# Patient Record
Sex: MALE | Race: WHITE | NOT HISPANIC OR LATINO | Employment: FULL TIME | URBAN - METROPOLITAN AREA
[De-identification: names, ages, dates, MRNs, and addresses within clinical notes are randomized per-mention and may not be internally consistent; named-entity substitution may affect disease eponyms.]

---

## 2017-11-10 ENCOUNTER — OFFICE VISIT (OUTPATIENT)
Dept: FAMILY MEDICINE | Facility: CLINIC | Age: 32
End: 2017-11-10
Payer: COMMERCIAL

## 2017-11-10 VITALS
DIASTOLIC BLOOD PRESSURE: 88 MMHG | SYSTOLIC BLOOD PRESSURE: 130 MMHG | BODY MASS INDEX: 31.34 KG/M2 | WEIGHT: 202.5 LBS | HEART RATE: 87 BPM | OXYGEN SATURATION: 97 % | TEMPERATURE: 98.2 F

## 2017-11-10 DIAGNOSIS — Z00.01 ENCOUNTER FOR GENERAL ADULT MEDICAL EXAMINATION WITH ABNORMAL FINDINGS: Primary | ICD-10-CM

## 2017-11-10 DIAGNOSIS — K21.9 GASTROESOPHAGEAL REFLUX DISEASE WITHOUT ESOPHAGITIS: ICD-10-CM

## 2017-11-10 DIAGNOSIS — R03.0 ELEVATED BLOOD PRESSURE READING WITHOUT DIAGNOSIS OF HYPERTENSION: ICD-10-CM

## 2017-11-10 DIAGNOSIS — F32.A ANXIETY AND DEPRESSION: ICD-10-CM

## 2017-11-10 DIAGNOSIS — F41.9 ANXIETY AND DEPRESSION: ICD-10-CM

## 2017-11-10 DIAGNOSIS — E66.3 OVERWEIGHT (BMI 25.0-29.9): ICD-10-CM

## 2017-11-10 DIAGNOSIS — Z13.6 CARDIOVASCULAR SCREENING; LDL GOAL LESS THAN 160: ICD-10-CM

## 2017-11-10 PROCEDURE — 83721 ASSAY OF BLOOD LIPOPROTEIN: CPT | Mod: 59 | Performed by: FAMILY MEDICINE

## 2017-11-10 PROCEDURE — 80061 LIPID PANEL: CPT | Performed by: FAMILY MEDICINE

## 2017-11-10 PROCEDURE — 99395 PREV VISIT EST AGE 18-39: CPT | Performed by: FAMILY MEDICINE

## 2017-11-10 PROCEDURE — 36415 COLL VENOUS BLD VENIPUNCTURE: CPT | Performed by: FAMILY MEDICINE

## 2017-11-10 PROCEDURE — 80053 COMPREHEN METABOLIC PANEL: CPT | Performed by: FAMILY MEDICINE

## 2017-11-10 RX ORDER — SERTRALINE HYDROCHLORIDE 100 MG/1
100 TABLET, FILM COATED ORAL DAILY
Qty: 90 TABLET | Refills: 3 | Status: SHIPPED | OUTPATIENT
Start: 2017-11-10 | End: 2018-11-08

## 2017-11-10 RX ORDER — SERTRALINE HYDROCHLORIDE 100 MG/1
TABLET, FILM COATED ORAL
Qty: 90 TABLET | Refills: 1 | Status: CANCELLED | OUTPATIENT
Start: 2017-11-10

## 2017-11-10 NOTE — PROGRESS NOTES
SUBJECTIVE:   CC: Viet Beebe is an 32 year old male who presents for preventative health visit.     Physical   Annual:     Getting at least 3 servings of Calcium per day::  Yes    Bi-annual eye exam::  NO    Dental care twice a year::  Yes    Sleep apnea or symptoms of sleep apnea::  Sleep apnea    Diet::  Regular (no restrictions)    Frequency of exercise::  None    Taking medications regularly::  Yes    Medication side effects::  None    Additional concerns today::  YES            -------------------------------------    Today's PHQ-2 Score: PHQ-2 ( 1999 [employer]) 11/10/2017   Q1: Little interest or pleasure in doing things 0   Q2: Feeling down, depressed or hopeless 0   PHQ-2 Score 0   Q1: Little interest or pleasure in doing things Not at all   Q2: Feeling down, depressed or hopeless Not at all   PHQ-2 Score 0       Abuse: Current or Past(Physical, Sexual or Emotional)- No  Do you feel safe in your environment - Yes    Social History   Substance Use Topics     Smoking status: Former Smoker     Types: Cigarettes     Quit date: 1/1/2010     Smokeless tobacco: Never Used     Alcohol use Yes      Comment: Drinks over rhe w/e. 5 drinks weekly     The patient does not drink >3 drinks per day nor >7 drinks per week.    Last PSA: No results found for: PSA    Reviewed orders with patient. Reviewed health maintenance and updated orders accordingly - Yes  Labs reviewed in EPIC    Reviewed and updated as needed this visit by clinical staff  Tobacco  Allergies  Meds  Med Hx  Surg Hx  Fam Hx  Soc Hx        Reviewed and updated as needed this visit by Provider  Tobacco        Past Medical History:   Diagnosis Date     Anxiety and depression     Sees Psychiatrist, [place], [place]. Depression for many years     Hypertension 2012     IBS (irritable bowel syndrome)       Past Surgical History:   Procedure Laterality Date     APPENDECTOMY  2007       Review of Systems  C: NEGATIVE for fever, chills, change in weight  I:  NEGATIVE for worrisome rashes, moles or lesions  E: NEGATIVE for vision changes or irritation  ENT: NEGATIVE for ear, mouth and throat problems  R: NEGATIVE for significant cough or SOB  CV: NEGATIVE for chest pain, palpitations or peripheral edema  GI: NEGATIVE for nausea, abdominal pain, heartburn, or change in bowel habits   male: negative for dysuria, hematuria, decreased urinary stream, erectile dysfunction, urethral discharge  M: NEGATIVE for significant arthralgias or myalgia  N: NEGATIVE for weakness, dizziness or paresthesias  P: NEGATIVE for changes in mood or affect    OBJECTIVE:   /88  Pulse 87  Temp 98.2  F (36.8  C) (Oral)  Wt 202 lb 8 oz (91.9 kg)  SpO2 97%  BMI 31.34 kg/m2    Physical Exam  GENERAL: healthy, alert and no distress  EYES: Eyes grossly normal to inspection, PERRL and conjunctivae and sclerae normal  HENT: ear canals and TM's normal, nose and mouth without ulcers or lesions  NECK: no adenopathy, no asymmetry, masses, or scars and thyroid normal to palpation  RESP: lungs clear to auscultation - no rales, rhonchi or wheezes  CV: regular rate and rhythm, normal S1 S2, no S3 or S4, no murmur, click or rub, no peripheral edema and peripheral pulses strong  ABDOMEN: soft, nontender, no hepatosplenomegaly, no masses and bowel sounds normal   (male): normal male genitalia without lesions or urethral discharge, no hernia  MS: no gross musculoskeletal defects noted, no edema  SKIN: no suspicious lesions or rashes  NEURO: Normal strength and tone, mentation intact and speech normal  PSYCH: mentation appears normal, affect normal/bright    ASSESSMENT/PLAN:   ASSESSMENT AND PLAN  1. Encounter for general adult medical examination with abnormal findings  Already had flu shot, tdap up to date.     2. Elevated blood pressure reading without diagnosis of hypertension  Previously on lisinopril then lost weight.  Currently increased job stress, weight gain.  Re-check today acceptable.  "    Plans to check 1-2 times/week at home.  If consistently above 130/80 will let me know and we can re-start lisinopril.      Discussed exercise/weight strategies.  Follow up one year at latest.   - Comprehensive metabolic panel    3. Overweight (BMI 25.0-29.9)  As above.     4. Gastroesophageal reflux disease without esophagitis  Not controlled with raniditine, reviewed risks of dementia with PPIs.   - omeprazole (PRILOSEC) 20 MG CR capsule; Take 1 capsule (20 mg) by mouth 30-60 minutes before a meal.  Generic med only.  Dispense: 90 capsule; Refill: 3    5. Anxiety and depression  Well controlled.  Continue sertraline.   - sertraline (ZOLOFT) 100 MG tablet; Take 1 tablet (100 mg) by mouth daily  Dispense: 90 tablet; Refill: 3    6. CARDIOVASCULAR SCREENING; LDL GOAL LESS THAN 160    - Lipid panel reflex to direct LDL Fasting    COUNSELING:   Reviewed preventive health counseling, as reflected in patient instructions       Regular exercise       Healthy diet/nutrition           reports that he quit smoking about 7 years ago. His smoking use included Cigarettes. He has never used smokeless tobacco.      Estimated body mass index is 31.34 kg/(m^2) as calculated from the following:    Height as of 10/19/16: 5' 7.4\" (1.712 m).    Weight as of this encounter: 202 lb 8 oz (91.9 kg).   Weight management plan: Discussed healthy diet and exercise guidelines and patient will follow up in 12 months in clinic to re-evaluate.    Counseling Resources:  ATP IV Guidelines  Pooled Cohorts Equation Calculator  FRAX Risk Assessment  ICSI Preventive Guidelines  Dietary Guidelines for Americans, 2010  USDA's MyPlate  ASA Prophylaxis  Lung CA Screening    Joel Daniel Wegener, MD  [facility]  Answers for HPI/ROS submitted by the patient on 11/10/2017   PHQ-2 Score: 0    "

## 2017-11-10 NOTE — MR AVS SNAPSHOT
After Visit Summary   11/10/2017    Viet Beebe    MRN: [id number]           Patient Information     Date Of Birth          1985        Visit Information        Provider Department      11/10/2017 3:20 PM Wegener, Joel Daniel Irwin, MD [facility]        Today's Diagnoses     Encounter for general adult medical examination with abnormal findings    -  1    Elevated blood pressure reading without diagnosis of hypertension        Overweight (BMI 25.0-29.9)        Gastroesophageal reflux disease without esophagitis        Anxiety and depression        CARDIOVASCULAR SCREENING; LDL GOAL LESS THAN 160          Care Instructions    ASSESSMENT AND PLAN  1. Encounter for general adult medical examination with abnormal findings  Already had flu shot, tdap up to date.     2. Elevated blood pressure reading without diagnosis of hypertension  Previously on lisinopril then lost weight.  Currently increased job stress, weight gain.  Re-check today acceptable.     Plans to check 1-2 times/week at home.  If consistently above 130/80 will let me know and we can re-start lisinopril.      Discussed exercise/weight strategies.  Follow up one year at latest.   - Comprehensive metabolic panel    3. Overweight (BMI 25.0-29.9)  As above.     4. Gastroesophageal reflux disease without esophagitis  Not controlled with raniditine, reviewed risks of dementia with PPIs.   - omeprazole (PRILOSEC) 20 MG CR capsule; Take 1 capsule (20 mg) by mouth 30-60 minutes before a meal.  Generic med only.  Dispense: 90 capsule; Refill: 3    5. Anxiety and depression  Well controlled.  Continue sertraline.   - sertraline (ZOLOFT) 100 MG tablet; Take 1 tablet (100 mg) by mouth daily  Dispense: 90 tablet; Refill: 3    6. CARDIOVASCULAR SCREENING; LDL GOAL LESS THAN 160    - Lipid panel reflex to direct LDL Fasting        MYCHART SIGNUP FOR E-VISITS AND EASIER COMMUNICATION:  [email or web address]     Zipnosis:   [place].  Sign up for e-visits for common illnesses!     RADIOLOGY:   [facility]:  [phone] to schedule any radiology tests at [facility]: [phone]    Mammograms/colonoscopies:  [phone]    CONSUMER PRICE LINE for estimates of test costs:  [phone]       Preventive Health Recommendations  Male Ages 26 - 39    Yearly exam:             See your health care provider every year in order to  o   Review health changes.   o   Discuss preventive care.    o   Review your medicines if your doctor has prescribed any.    You should be tested each year for STDs (sexually transmitted diseases), if you re at risk.     After age 35, talk to your provider about cholesterol testing. If you are at risk for heart disease, have your cholesterol tested at least every 5 years.     If you are at risk for diabetes, you should have a diabetes test (fasting glucose).  Shots: Get a flu shot each year. Get a tetanus shot every 10 years.     Nutrition:    Eat at least 5 servings of fruits and vegetables daily.     Eat whole-grain bread, whole-wheat pasta and brown rice instead of white grains and rice.     Talk to your provider about Calcium and Vitamin D.     Lifestyle    Exercise for at least 150 minutes a week (30 minutes a day, 5 days a week). This will help you control your weight and prevent disease.     Limit alcohol to one drink per day.     No smoking.     Wear sunscreen to prevent skin cancer.     See your dentist every six months for an exam and cleaning.             Follow-ups after your visit        Who to contact     If you have questions or need follow up information about today's clinic visit or your schedule please contact [facility] directly at [phone].  Normal or non-critical lab and imaging results will be communicated to you by MyChart, letter or phone within 4 business days after the clinic has received the results. If you do not hear from us within  "7 days, please contact the clinic through [employer] or phone. If you have a critical or abnormal lab result, we will notify you by phone as soon as possible.  Submit refill requests through [employer] or call your pharmacy and they will forward the refill request to us. Please allow 3 business days for your refill to be completed.          Additional Information About Your Visit        [employer] Information     [employer] lets you send messages to your doctor, view your test results, renew your prescriptions, schedule appointments and more. To sign up, go to [email or web address] . Click on \"Log in\" on the left side of the screen, which will take you to the Welcome page. Then click on \"Sign up Now\" on the right side of the page.     You will be asked to enter the access code listed below, as well as some personal information. Please follow the directions to create your username and password.     Your access code is: [email or web address]  Expires: 2018  3:49 PM     Your access code will  in 90 days. If you need help or a new code, please call your [facility] clinic or [phone].        Care EveryWhere ID     This is your Care EveryWhere ID. This could be used by other organizations to access your [facility] medical records  [id number]        Your Vitals Were     Pulse Temperature Pulse Oximetry BMI (Body Mass Index)          87 98.2  F (36.8  C) (Oral) 97% 31.34 kg/m2         Blood Pressure from Last 3 Encounters:   11/10/17 130/88   16 130/76   10/19/16 132/82    Weight from Last 3 Encounters:   11/10/17 202 lb 8 oz (91.9 kg)   16 174 lb (78.9 kg)   10/19/16 170 lb (77.1 kg)              We Performed the Following     Comprehensive metabolic panel     Lipid panel reflex to direct LDL Fasting          Where to get your medicines      These medications were sent to [facility]. [facility] [address] [id number]     Phone:  [phone]     omeprazole 20 " MG CR capsule    sertraline 100 MG tablet          Primary Care Provider Office Phone # Fax #    Shakeel Daniel Irwin Wegener, [phone] [address]  [facility] [address]        Equal Access to Services     NASRINAFSHAN NANDA : Hadii aad ku hadkeno Soomaali, waaxda luqadaha, qaybta kaalmada adeegyada, waxforrest velascon adepatricia copeland ladaveantonella waterman. So [facility] [phone].    ATENCIÓN: Si habla español, tiene a slade disposición servicios gratuitos de asistencia lingüística. Llame al [phone].    We comply with applicable federal civil rights laws and [place] laws. We do not discriminate on the basis of race, color, national origin, age, disability, sex, sexual orientation, or gender identity.            Thank you!     Thank you for choosing [facility]  for your care. Our goal is always to provide you with excellent care. Hearing back from our patients is one way we can continue to improve our services. Please take a few minutes to complete the written survey that you may receive in the mail after your visit with us. Thank you!             Your Updated Medication List - Protect others around you: Learn how to safely use, store and throw away your medicines at www.disposemymeds.org.          This list is accurate as of: 11/10/17  3:49 PM.  Always use your most recent med list.                   Brand Name Dispense Instructions for use Diagnosis    cholecalciferol 1000 UNITS capsule    vitamin  -D    90 capsule    Take 1 capsule by mouth daily. 2-3  Tabs daily        DIPHENHYDRAMINE HCL PO      Take 25 mg by mouth nightly as needed        MELATONIN PO      Take by mouth At Bedtime        Multi-vitamin Tabs tablet     100 tablet    Take 1 tablet by mouth daily.        omeprazole 20 MG CR capsule    priLOSEC    90 capsule    Take 1 capsule (20 mg) by mouth 30-60 minutes before a meal.  Generic med only.    Gastroesophageal reflux disease without esophagitis       PRILOSEC PO      Take 20 mg  by mouth as needed        sertraline 100 MG tablet    ZOLOFT    90 tablet    Take 1 tablet (100 mg) by mouth daily    Anxiety and depression       UNABLE TO FIND      MEDICATION NAME:  Probiotic OTC

## 2017-11-10 NOTE — PATIENT INSTRUCTIONS
ASSESSMENT AND PLAN  1. Encounter for general adult medical examination with abnormal findings  Already had flu shot, tdap up to date.     2. Elevated blood pressure reading without diagnosis of hypertension  Previously on lisinopril then lost weight.  Currently increased job stress, weight gain.  Re-check today acceptable.     Plans to check 1-2 times/week at home.  If consistently above 130/80 will let me know and we can re-start lisinopril.      Discussed exercise/weight strategies.  Follow up one year at latest.   - Comprehensive metabolic panel    3. Overweight (BMI 25.0-29.9)  As above.     4. Gastroesophageal reflux disease without esophagitis  Not controlled with raniditine, reviewed risks of dementia with PPIs.   - omeprazole (PRILOSEC) 20 MG CR capsule; Take 1 capsule (20 mg) by mouth 30-60 minutes before a meal.  Generic med only.  Dispense: 90 capsule; Refill: 3    5. Anxiety and depression  Well controlled.  Continue sertraline.   - sertraline (ZOLOFT) 100 MG tablet; Take 1 tablet (100 mg) by mouth daily  Dispense: 90 tablet; Refill: 3    6. CARDIOVASCULAR SCREENING; LDL GOAL LESS THAN 160    - Lipid panel reflex to direct LDL Fasting        MYCHART SIGNUP FOR E-VISITS AND EASIER COMMUNICATION:  [email or web address]     Zipnosis:  [facility].  Sign up for e-visits for common illnesses!     RADIOLOGY:   [facility]:  [phone] to schedule any radiology tests at [facility]: [phone]    Mammograms/colonoscopies:  [phone]    CONSUMER PRICE LINE for estimates of test costs:  [phone]       Preventive Health Recommendations  Male Ages 26 - 39    Yearly exam:             See your health care provider every year in order to  o   Review health changes.   o   Discuss preventive care.    o   Review your medicines if your doctor has prescribed any.    You should be tested each year for STDs (sexually transmitted diseases), if you re at risk.     After age  35, talk to your provider about cholesterol testing. If you are at risk for heart disease, have your cholesterol tested at least every 5 years.     If you are at risk for diabetes, you should have a diabetes test (fasting glucose).  Shots: Get a flu shot each year. Get a tetanus shot every 10 years.     Nutrition:    Eat at least 5 servings of fruits and vegetables daily.     Eat whole-grain bread, whole-wheat pasta and brown rice instead of white grains and rice.     Talk to your provider about Calcium and Vitamin D.     Lifestyle    Exercise for at least 150 minutes a week (30 minutes a day, 5 days a week). This will help you control your weight and prevent disease.     Limit alcohol to one drink per day.     No smoking.     Wear sunscreen to prevent skin cancer.     See your dentist every six months for an exam and cleaning.

## 2017-11-10 NOTE — LETTER
November 21, 2017      Viet Beebe  [address] [facility] [address]        Dear ,    We are writing to inform you of your test results.    Your cholesterol was reasonably high.  I would continue to pay attention to cardiac exercise and eating healthy fats (fish weekly, olive oil).  Next year let's plan to make sure we check fasting levels (after 10 hours fasting.).      Resulted Orders   Comprehensive metabolic panel   Result Value Ref Range    Sodium 137 133 - 144 mmol/L    Potassium 3.9 3.4 - 5.3 mmol/L    Chloride 102 94 - 109 mmol/L    Carbon Dioxide 23 20 - 32 mmol/L    Anion Gap 12 3 - 14 mmol/L    Glucose 94 70 - 99 mg/dL      Comment:      Non Fasting    Urea Nitrogen 19 7 - 30 mg/dL    Creatinine 0.89 0.66 - 1.25 mg/dL    GFR Estimate >90 >60 mL/min/1.7m2      Comment:      Non  GFR Calc    GFR Estimate If Black >90 >60 mL/min/1.7m2      Comment:       GFR Calc    Calcium 9.4 8.5 - 10.1 mg/dL    Bilirubin Total 0.3 0.2 - 1.3 mg/dL    Albumin 4.7 3.4 - 5.0 g/dL    Protein Total 8.2 6.8 - 8.8 g/dL    Alkaline Phosphatase 89 40 - 150 U/L    ALT 45 0 - 70 U/L    AST 33 0 - 45 U/L   Lipid panel reflex to direct LDL Fasting   Result Value Ref Range    Cholesterol 229 (H) <200 mg/dL      Comment:      Desirable:       <200 mg/dl    Triglycerides 577 (H) <150 mg/dL      Comment:      Borderline high:  150-199 mg/dl  High:             200-499 mg/dl  Very high:       >499 mg/dl  Non Fasting      HDL Cholesterol 43 >39 mg/dL    LDL Cholesterol Calculated  <100 mg/dL     Cannot estimate LDL when triglyceride exceeds 400 mg/dL    Non HDL Cholesterol 186 (H) <130 mg/dL      Comment:      Above Desirable:  130-159 mg/dl  Borderline high:  160-189 mg/dl  High:             190-219 mg/dl  Very high:       >219 mg/dl     LDL cholesterol direct   Result Value Ref Range    LDL Cholesterol Direct 129 (H) <100 mg/dL      Comment:      Above desirable:  100-129  mg/dl  Borderline High:  130-159 mg/dL  High:             160-189 mg/dL  Very high:       >189 mg/dl       If you have any questions or concerns, please call the clinic at the number listed above.     Sincerely,    Joel Daniel Wegener, MD/nr

## 2017-11-10 NOTE — NURSING NOTE
"Chief Complaint   Patient presents with     Physical       Initial BP (!) 147/101  Pulse 87  Temp 98.2  F (36.8  C) (Oral)  Wt 202 lb 8 oz (91.9 kg)  SpO2 97%  BMI 31.34 kg/m2 Estimated body mass index is 31.34 kg/(m^2) as calculated from the following:    Height as of 10/19/16: 5' 7.4\" (1.712 m).    Weight as of this encounter: 202 lb 8 oz (91.9 kg).  Medication Reconciliation: complete   Nguyen Alegria MA      "

## 2017-11-11 LAB
ALBUMIN SERPL-MCNC: 4.7 G/DL (ref 3.4–5)
ALP SERPL-CCNC: 89 U/L (ref 40–150)
ALT SERPL W P-5'-P-CCNC: 45 U/L (ref 0–70)
ANION GAP SERPL CALCULATED.3IONS-SCNC: 12 MMOL/L (ref 3–14)
AST SERPL W P-5'-P-CCNC: 33 U/L (ref 0–45)
BILIRUB SERPL-MCNC: 0.3 MG/DL (ref 0.2–1.3)
BUN SERPL-MCNC: 19 MG/DL (ref 7–30)
CALCIUM SERPL-MCNC: 9.4 MG/DL (ref 8.5–10.1)
CHLORIDE SERPL-SCNC: 102 MMOL/L (ref 94–109)
CHOLEST SERPL-MCNC: 229 MG/DL
CO2 SERPL-SCNC: 23 MMOL/L (ref 20–32)
CREAT SERPL-MCNC: 0.89 MG/DL (ref 0.66–1.25)
GFR SERPL CREATININE-BSD FRML MDRD: >90 ML/MIN/1.7M2
GLUCOSE SERPL-MCNC: 94 MG/DL (ref 70–99)
HDLC SERPL-MCNC: 43 MG/DL
LDLC SERPL CALC-MCNC: ABNORMAL MG/DL
LDLC SERPL DIRECT ASSAY-MCNC: 129 MG/DL
NONHDLC SERPL-MCNC: 186 MG/DL
POTASSIUM SERPL-SCNC: 3.9 MMOL/L (ref 3.4–5.3)
PROT SERPL-MCNC: 8.2 G/DL (ref 6.8–8.8)
SODIUM SERPL-SCNC: 137 MMOL/L (ref 133–144)
TRIGL SERPL-MCNC: 577 MG/DL

## 2018-03-28 ENCOUNTER — E-VISIT (OUTPATIENT)
Dept: FAMILY MEDICINE | Facility: CLINIC | Age: 33
End: 2018-03-28
Payer: COMMERCIAL

## 2018-03-28 DIAGNOSIS — F32.A ANXIETY AND DEPRESSION: ICD-10-CM

## 2018-03-28 DIAGNOSIS — F41.9 ANXIETY AND DEPRESSION: ICD-10-CM

## 2018-03-28 PROCEDURE — 99444 ZZC PHYSICIAN ONLINE EVALUATION & MANAGEMENT SERVICE: CPT | Performed by: FAMILY MEDICINE

## 2018-03-28 RX ORDER — SERTRALINE HYDROCHLORIDE 100 MG/1
150 TABLET, FILM COATED ORAL DAILY
Qty: 90 TABLET | Refills: 1 | Status: SHIPPED | OUTPATIENT
Start: 2018-03-28 | End: 2018-04-02

## 2018-03-28 ASSESSMENT — PATIENT HEALTH QUESTIONNAIRE - PHQ9
10. IF YOU CHECKED OFF ANY PROBLEMS, HOW DIFFICULT HAVE THESE PROBLEMS MADE IT FOR YOU TO DO YOUR WORK, TAKE CARE OF THINGS AT HOME, OR GET ALONG WITH OTHER PEOPLE: SOMEWHAT DIFFICULT
SUM OF ALL RESPONSES TO PHQ QUESTIONS 1-9: 5
SUM OF ALL RESPONSES TO PHQ QUESTIONS 1-9: 5

## 2018-03-28 ASSESSMENT — ANXIETY QUESTIONNAIRES
4. TROUBLE RELAXING: NOT AT ALL
5. BEING SO RESTLESS THAT IT IS HARD TO SIT STILL: NOT AT ALL
2. NOT BEING ABLE TO STOP OR CONTROL WORRYING: SEVERAL DAYS
GAD7 TOTAL SCORE: 4
3. WORRYING TOO MUCH ABOUT DIFFERENT THINGS: SEVERAL DAYS
6. BECOMING EASILY ANNOYED OR IRRITABLE: SEVERAL DAYS
1. FEELING NERVOUS, ANXIOUS, OR ON EDGE: SEVERAL DAYS
7. FEELING AFRAID AS IF SOMETHING AWFUL MIGHT HAPPEN: NOT AT ALL
7. FEELING AFRAID AS IF SOMETHING AWFUL MIGHT HAPPEN: NOT AT ALL

## 2018-03-29 ASSESSMENT — ANXIETY QUESTIONNAIRES: GAD7 TOTAL SCORE: 4

## 2018-03-29 ASSESSMENT — PATIENT HEALTH QUESTIONNAIRE - PHQ9: SUM OF ALL RESPONSES TO PHQ QUESTIONS 1-9: 5

## 2018-04-02 ENCOUNTER — MYC MEDICAL ADVICE (OUTPATIENT)
Dept: FAMILY MEDICINE | Facility: CLINIC | Age: 33
End: 2018-04-02

## 2018-04-02 DIAGNOSIS — F32.A ANXIETY AND DEPRESSION: ICD-10-CM

## 2018-04-02 DIAGNOSIS — F41.9 ANXIETY AND DEPRESSION: ICD-10-CM

## 2018-04-02 RX ORDER — SERTRALINE HYDROCHLORIDE 100 MG/1
150 TABLET, FILM COATED ORAL DAILY
Qty: 135 TABLET | Refills: 1 | Status: SHIPPED | OUTPATIENT
Start: 2018-04-02 | End: 2018-12-03

## 2018-04-02 NOTE — TELEPHONE ENCOUNTER
Zoloft pended with new dispense amount, per patient's request.    Dr. Wegener/Covering providers-Please review and sign if agree.    Thank you!  DIEGO Hart, RN      PHQ-9 SCORE 11/22/2016 3/28/2018 3/28/2018   Total Score - - -   Total Score MyChart - 5 (Mild depression) 5 (Mild depression)   Total Score 6 5 5     MONIK-7 SCORE 3/28/2018 3/28/2018   Total Score 4 (minimal anxiety) 4 (minimal anxiety)   Total Score 4 4

## 2018-11-08 ENCOUNTER — TELEPHONE (OUTPATIENT)
Dept: FAMILY MEDICINE | Facility: CLINIC | Age: 33
End: 2018-11-08

## 2018-11-08 DIAGNOSIS — F32.A ANXIETY AND DEPRESSION: ICD-10-CM

## 2018-11-08 DIAGNOSIS — F41.9 ANXIETY AND DEPRESSION: ICD-10-CM

## 2018-11-08 DIAGNOSIS — K21.9 GASTROESOPHAGEAL REFLUX DISEASE WITHOUT ESOPHAGITIS: ICD-10-CM

## 2018-11-08 NOTE — TELEPHONE ENCOUNTER
This is a duplicate   There is also a refill request for today's date with same meds     Closing this encounter     Sfoi Schroeder Registered Nurse   [facility]

## 2018-11-08 NOTE — TELEPHONE ENCOUNTER
Reason for Call:  Medication or medication refill:    Do you use a [facility]?  Name of the pharmacy and phone number for the current request:        [facility]. [address] [address]  Phone: [phone] Fax: [phone]        Name of the medication requested: omeprazole (PRILOSEC) 20 MG CR capsule AND sertraline (ZOLOFT) 100 MG tablet    Other request: Please send to pharmacy- pharmacist requesting for patient      Call taken on 11/8/2018 at 8:43 AM by China Moreno

## 2018-11-08 NOTE — TELEPHONE ENCOUNTER
"Requested Prescriptions   Pending Prescriptions Disp Refills     sertraline (ZOLOFT) 100 MG tablet  Last Written Prescription Date:  4/2/2018  Last Fill Quantity: 135 tablet,  # refills: 1   Last Office Visit: 11/10/2017   Future Office Visit:      90 tablet 3     Sig: Take 1 tablet (100 mg) by mouth daily    SSRIs Protocol Passed    11/8/2018 10:41 AM       Passed - Recent (12 mo) or future (30 days) visit within the authorizing provider's specialty    Patient had office visit in the last 12 months or has a visit in the next 30 days with authorizing provider or within the authorizing provider's specialty.  See \"Patient Info\" tab in inbasket, or \"Choose Columns\" in Meds & Orders section of the refill encounter.           Passed - Patient is age 18 or older     _________________________________________________________________________________________________________________________       omeprazole (PRILOSEC) 20 MG CR capsule       Last Written Prescription Date:  11/10/2017  Last Fill Quantity: 90 capsule,   # refills: 3  Last Office Visit: 11/10/2017  Future Office visit:       Routing refill request to provider for review/approval because:  Drug not on the [facility], [facility] or  Health refill protocol or controlled substance                       "

## 2018-11-12 RX ORDER — SERTRALINE HYDROCHLORIDE 100 MG/1
100 TABLET, FILM COATED ORAL DAILY
Qty: 90 TABLET | Refills: 0 | Status: SHIPPED | OUTPATIENT
Start: 2018-11-12 | End: 2018-12-03

## 2018-11-12 NOTE — TELEPHONE ENCOUNTER
"Routing refill request to provider for review/approval because:  -Patient needs to be seen because it has been more than 1 year since last office visit.  -PHQ-9 > 4    Omeprazole refilled according to protocol for 30 day supply.    Covering providers-Please sign if agree    Team coordinators-Please contact patient to schedule annual office visit.    Thank you!  DIEGO Hart, RN          PHQ-9 SCORE 11/22/2016 3/28/2018 3/28/2018   Total Score - - -   Total Score MyChart - 5 (Mild depression) 5 (Mild depression)   Total Score 6 5 5       Requested Prescriptions   Pending Prescriptions Disp Refills     sertraline (ZOLOFT) 100 MG tablet 90 tablet 3     Sig: Take 1 tablet (100 mg) by mouth daily    SSRIs Protocol Passed    11/8/2018  1:56 PM       Passed - Recent (12 mo) or future (30 days) visit within the authorizing provider's specialty    Patient had office visit in the last 12 months or has a visit in the next 30 days with authorizing provider or within the authorizing provider's specialty.  See \"Patient Info\" tab in inbasket, or \"Choose Columns\" in Meds & Orders section of the refill encounter.             Passed - Patient is age 18 or older        omeprazole (PRILOSEC) 20 MG CR capsule 90 capsule 3     Sig: Take 1 capsule (20 mg) by mouth 30-60 minutes before a meal.  Generic med only.    PPI Protocol Passed    11/8/2018  1:56 PM       Passed - Not on Clopidogrel (unless Pantoprazole ordered)       Passed - No diagnosis of osteoporosis on record       Passed - Recent (12 mo) or future (30 days) visit within the authorizing provider's specialty    Patient had office visit in the last 12 months or has a visit in the next 30 days with authorizing provider or within the authorizing provider's specialty.  See \"Patient Info\" tab in inbasket, or \"Choose Columns\" in Meds & Orders section of the refill encounter.             Passed - Patient is age 18 or older          "

## 2018-12-03 ENCOUNTER — OFFICE VISIT (OUTPATIENT)
Dept: FAMILY MEDICINE | Facility: CLINIC | Age: 33
End: 2018-12-03
Payer: COMMERCIAL

## 2018-12-03 VITALS
OXYGEN SATURATION: 98 % | HEIGHT: 68 IN | TEMPERATURE: 99.8 F | HEART RATE: 98 BPM | RESPIRATION RATE: 18 BRPM | WEIGHT: 205 LBS | DIASTOLIC BLOOD PRESSURE: 113 MMHG | SYSTOLIC BLOOD PRESSURE: 149 MMHG | BODY MASS INDEX: 31.07 KG/M2

## 2018-12-03 DIAGNOSIS — E55.9 HYPOVITAMINOSIS D: ICD-10-CM

## 2018-12-03 DIAGNOSIS — I10 HYPERTENSION GOAL BP (BLOOD PRESSURE) < 130/80: ICD-10-CM

## 2018-12-03 DIAGNOSIS — Z00.00 ROUTINE GENERAL MEDICAL EXAMINATION AT A HEALTH CARE FACILITY: Primary | ICD-10-CM

## 2018-12-03 DIAGNOSIS — F41.9 ANXIETY AND DEPRESSION: ICD-10-CM

## 2018-12-03 DIAGNOSIS — F32.A ANXIETY AND DEPRESSION: ICD-10-CM

## 2018-12-03 DIAGNOSIS — K21.9 GASTROESOPHAGEAL REFLUX DISEASE WITHOUT ESOPHAGITIS: ICD-10-CM

## 2018-12-03 DIAGNOSIS — Z13.6 CARDIOVASCULAR SCREENING; LDL GOAL LESS THAN 160: ICD-10-CM

## 2018-12-03 PROCEDURE — 80053 COMPREHEN METABOLIC PANEL: CPT | Performed by: FAMILY MEDICINE

## 2018-12-03 PROCEDURE — 99213 OFFICE O/P EST LOW 20 MIN: CPT | Mod: 25 | Performed by: FAMILY MEDICINE

## 2018-12-03 PROCEDURE — 36415 COLL VENOUS BLD VENIPUNCTURE: CPT | Performed by: FAMILY MEDICINE

## 2018-12-03 PROCEDURE — 99395 PREV VISIT EST AGE 18-39: CPT | Performed by: FAMILY MEDICINE

## 2018-12-03 PROCEDURE — 83721 ASSAY OF BLOOD LIPOPROTEIN: CPT | Mod: 59 | Performed by: FAMILY MEDICINE

## 2018-12-03 PROCEDURE — 80061 LIPID PANEL: CPT | Performed by: FAMILY MEDICINE

## 2018-12-03 PROCEDURE — 82043 UR ALBUMIN QUANTITATIVE: CPT | Performed by: FAMILY MEDICINE

## 2018-12-03 PROCEDURE — 82306 VITAMIN D 25 HYDROXY: CPT | Performed by: FAMILY MEDICINE

## 2018-12-03 RX ORDER — LISINOPRIL/HYDROCHLOROTHIAZIDE 10-12.5 MG
1 TABLET ORAL DAILY
Qty: 90 TABLET | Refills: 3 | Status: SHIPPED | OUTPATIENT
Start: 2018-12-03 | End: 2020-01-12

## 2018-12-03 RX ORDER — SERTRALINE HYDROCHLORIDE 100 MG/1
150 TABLET, FILM COATED ORAL DAILY
Qty: 135 TABLET | Refills: 3 | Status: SHIPPED | OUTPATIENT
Start: 2018-12-03 | End: 2020-01-10

## 2018-12-03 ASSESSMENT — PATIENT HEALTH QUESTIONNAIRE - PHQ9: SUM OF ALL RESPONSES TO PHQ QUESTIONS 1-9: 2

## 2018-12-03 NOTE — LETTER
My Depression Action Plan  Name: Viet Beebe   Date of Birth 1985  Date: 12/3/2018    My doctor: Wegener, Joel Daniel Irwin   My clinic: [address] [address] [phone]          GREEN    ZONE   Good Control    What it looks like:     Things are going generally well. You have normal up s and down s. You may even feel depressed from time to time, but bad moods usually last less than a day.   What you need to do:  1. Continue to care for yourself (see self care plan)  2. Check your depression survival kit and update it as needed  3. Follow your physician s recommendations including any medication.  4. Do not stop taking medication unless you consult with your physician first.           YELLOW         ZONE Getting Worse    What it looks like:     Depression is starting to interfere with your life.     It may be hard to get out of bed; you may be starting to isolate yourself from others.    Symptoms of depression are starting to last most all day and this has happened for several days.     You may have suicidal thoughts but they are not constant.   What you need to do:     1. Call your care team, your response to treatment will improve if you keep your care team informed of your progress. Yellow periods are signs an adjustment may need to be made.     2. Continue your self-care, even if you have to fake it!    3. Talk to someone in your support network    4. Open up your depression survival kit           RED    ZONE Medical Alert - Get Help    What it looks like:     Depression is seriously interfering with your life.     You may experience these or other symptoms: You can t get out of bed most days, can t work or engage in other necessary activities, you have trouble taking care of basic hygiene, or basic responsibilities, thoughts of suicide or death that will not go away, self-injurious behavior.     What you need to do:  1. Call your care  team and request a same-day appointment. If they are not available (weekends or after hours) call your local crisis line, emergency room or 911.            Depression Self Care Plan / Survival Kit    Self-Care for Depression  Here s the deal. Your body and mind are really not as separate as most people think.  What you do and think affects how you feel and how you feel influences what you do and think. This means if you do things that people who feel good do, it will help you feel better.  Sometimes this is all it takes.  There is also a place for medication and therapy depending on how severe your depression is, so be sure to consult with your medical provider and/ or Behavioral Health Consultant if your symptoms are worsening or not improving.     In order to better manage my stress, I will:    Exercise  Get some form of exercise, every day. This will help reduce pain and release endorphins, the  feel good  chemicals in your brain. This is almost as good as taking antidepressants!  This is not the same as joining a gym and then never going! (they count on that by the way ) It can be as simple as just going for a walk or doing some gardening, anything that will get you moving.      Hygiene   Maintain good hygiene (Get out of bed in the morning, Make your bed, Brush your teeth, Take a shower, and Get dressed like you were going to work, even if you are unemployed).  If your clothes don't fit try to get ones that do.    Diet  I will strive to eat foods that are good for me, drink plenty of water, and avoid excessive sugar, caffeine, alcohol, and other mood-altering substances.  Some foods that are helpful in depression are: complex carbohydrates, B vitamins, flaxseed, fish or fish oil, fresh fruits and vegetables.    Psychotherapy  I agree to participate in Individual Therapy (if recommended).    Medication  If prescribed medications, I agree to take them.  Missing doses can result in serious side effects.  I  understand that drinking alcohol, or other illicit drug use, may cause potential side effects.  I will not stop my medication abruptly without first discussing it with my provider.    Staying Connected With Others  I will stay in touch with my friends, family members, and my primary care provider/team.    Use your imagination  Be creative.  We all have a creative side; it doesn t matter if it s oil painting, sand castles, or mud pies! This will also kick up the endorphins.    Witness Beauty  (AKA stop and smell the roses) Take a look outside, even in mid-winter. Notice colors, textures. Watch the squirrels and birds.     Service to others  Be of service to others.  There is always someone else in need.  By helping others we can  get out of ourselves  and remember the really important things.  This also provides opportunities for practicing all the other parts of the program.    Humor  Laugh and be silly!  Adjust your TV habits for less news and crime-drama and more comedy.    Control your stress  Try breathing deep, massage therapy, biofeedback, and meditation. Find time to relax each day.     My support system    Clinic Contact:  Phone number:    Contact 1:  Phone number:    Contact 2:  Phone number:    Oriental orthodox/:  Phone number:    Therapist:  Phone number:    Local crisis center:    Phone number:    Other community support:  Phone number:

## 2018-12-03 NOTE — PROGRESS NOTES
SUBJECTIVE:   CC: Viet Beebe is an 33 year old male who presents for preventative health visit.     Physical   Annual:     Getting at least 3 servings of Calcium per day:  Yes    Bi-annual eye exam:  Yes    Dental care twice a year:  Yes    Sleep apnea or symptoms of sleep apnea:  Sleep apnea    Diet:  Regular (no restrictions)    Frequency of exercise:  None    Taking medications regularly:  Yes    Medication side effects:  None    Additional concerns today:  Yes    PHQ-2 Total Score: 0          PROBLEMS TO ADD ON...  -------------------------------------  Refills for anxiety/mood med.  Working very well.  Does not want/need to stop.     Blood pressue high at dentist office, wondering if needs to re-start meds.  Travelling a lot for work, just bought new house all have decreased exercise and worsened diet.       Today's PHQ-2 Score:   PHQ-2 ( 1999 [employer]) 12/3/2018   Q1: Little interest or pleasure in doing things 0   Q2: Feeling down, depressed or hopeless 0   PHQ-2 Score 0   Q1: Little interest or pleasure in doing things Not at all   Q2: Feeling down, depressed or hopeless Not at all   PHQ-2 Score 0       Abuse: Current or Past(Physical, Sexual or Emotional)- No  Do you feel safe in your environment? Yes    Social History   Substance Use Topics     Smoking status: Former Smoker     Types: Cigarettes     Quit date: 1/1/2010     Smokeless tobacco: Never Used     Alcohol use Yes      Comment: Drinks over rhe w/e. 5 drinks weekly     Alcohol Use 12/3/2018   If you drink alcohol do you typically have greater than 3 drinks per day OR greater than 7 drinks per week? No       Last PSA: No results found for: PSA    Reviewed orders with patient. Reviewed health maintenance and updated orders accordingly - Yes  Labs reviewed in EPIC    Reviewed and updated as needed this visit by clinical staff         Reviewed and updated as needed this visit by Provider        Past Medical History:   Diagnosis Date     Anxiety and  depression     Sees Psychiatrist, [place], [place]. Depression for many years     Hypertension 2012     IBS (irritable bowel syndrome)       Past Surgical History:   Procedure Laterality Date     APPENDECTOMY  2007       Review of Systems  CONSTITUTIONAL: NEGATIVE for fever, chills, change in weight  INTEGUMENTARY/SKIN: NEGATIVE for worrisome rashes, moles or lesions  EYES: NEGATIVE for vision changes or irritation  ENT: NEGATIVE for ear, mouth and throat problems  RESP: NEGATIVE for significant cough or SOB  CV: NEGATIVE for chest pain, palpitations or peripheral edema  GI: NEGATIVE for nausea, abdominal pain, heartburn, or change in bowel habits   male: negative for dysuria, hematuria, decreased urinary stream, erectile dysfunction, urethral discharge  MUSCULOSKELETAL: NEGATIVE for significant arthralgias or myalgia  NEURO: NEGATIVE for weakness, dizziness or paresthesias  PSYCHIATRIC: NEGATIVE for changes in mood or affect    OBJECTIVE:   There were no vitals taken for this visit.    Physical Exam  GENERAL: healthy, alert and no distress  EYES: Eyes grossly normal to inspection, PERRL and conjunctivae and sclerae normal  HENT: ear canals and TM's normal, nose and mouth without ulcers or lesions  NECK: no adenopathy, no asymmetry, masses, or scars and thyroid normal to palpation  RESP: lungs clear to auscultation - no rales, rhonchi or wheezes  CV: regular rate and rhythm, normal S1 S2, no S3 or S4, no murmur, click or rub, no peripheral edema and peripheral pulses strong  ABDOMEN: soft, nontender, no hepatosplenomegaly, no masses and bowel sounds normal   (male): normal male genitalia without lesions or urethral discharge, no hernia  MS: no gross musculoskeletal defects noted, no edema  SKIN: no suspicious lesions or rashes  NEURO: Normal strength and tone, mentation intact and speech normal  PSYCH: mentation appears normal, affect normal/bright    Diagnostic Test Results:  none     ASSESSMENT/PLAN:   1. Routine  "general medical examination at a health care facility  Had flu shot already, up to date on tdap per [facility].     2. Hypertension goal BP (blood pressure) < 130/80  Uncontrolled.  Likely due to weight gain, stopped exercising.     Re-start lisinopril/hctz, follow up one month nurse blood pressue check.  Reviewed common side effects.     Encouraged to re-start daily exercise and watch diet/weigh tloss.     - lisinopril-hydrochlorothiazide (PRINZIDE/ZESTORETIC) 10-12.5 MG tablet; Take 1 tablet by mouth daily 1 daily  Dispense: 90 tablet; Refill: 3  - Albumin Random Urine Quantitative with Creat Ratio  - Comprehensive metabolic panel    3. Gastroesophageal reflux disease without esophagitis  Refills.   - omeprazole (PRILOSEC) 20 MG DR capsule; Take 1 capsule (20 mg) by mouth 30-60 minutes before a meal.  Generic med only.  Dispense: 90 capsule; Refill: 3    4. Anxiety and depression  Working very well.  Sertraline usually weight reducing and use of this medication does not coincide with weight gain.   - sertraline (ZOLOFT) 100 MG tablet; Take 1.5 tablets (150 mg) by mouth daily  Dispense: 135 tablet; Refill: 3    5. Hypovitaminosis D  Reviewed normal supplement doses of 300 units/day.  He is not sure what is taking.   - Vitamin D Deficiency    6. CARDIOVASCULAR SCREENING; LDL GOAL LESS THAN 160    - Lipid panel reflex to direct LDL Fasting    COUNSELING:   Reviewed preventive health counseling, as reflected in patient instructions       Regular exercise       Healthy diet/nutrition    BP Readings from Last 1 Encounters:   11/10/17 130/88     Estimated body mass index is 31.34 kg/(m^2) as calculated from the following:    Height as of 10/19/16: 5' 7.4\" (1.712 m).    Weight as of 11/10/17: 202 lb 8 oz (91.9 kg).      Weight management plan: Discussed healthy diet and exercise guidelines     reports that he quit smoking about 8 years ago. His smoking use included Cigarettes. He has never used smokeless " tobacco.      Counseling Resources:  ATP IV Guidelines  Pooled Cohorts Equation Calculator  FRAX Risk Assessment  ICSI Preventive Guidelines  Dietary Guidelines for Americans, 2010  USDA's MyPlate  ASA Prophylaxis  Lung CA Screening    Joel Daniel Wegener, MD  [facility] [place]

## 2018-12-03 NOTE — MR AVS SNAPSHOT
After Visit Summary   12/3/2018    Viet Beebe    MRN: [id number]           Patient Information     Date Of Birth          1985        Visit Information        Provider Department      12/3/2018 2:00 PM Wegener, Joel Daniel Irwin, MD [facility]        Today's Diagnoses     Routine general medical examination at a health care facility    -  1    Hypertension goal BP (blood pressure) < 130/80        Gastroesophageal reflux disease without esophagitis        Anxiety and depression        Hypovitaminosis D        CARDIOVASCULAR SCREENING; LDL GOAL LESS THAN 160          Care Instructions      Preventive Health Recommendations  Male Ages 26 - 39    Yearly exam:             See your health care provider every year in order to  o   Review health changes.   o   Discuss preventive care.    o   Review your medicines if your doctor has prescribed any.    You should be tested each year for STDs (sexually transmitted diseases), if you re at risk.     After age 35, talk to your provider about cholesterol testing. If you are at risk for heart disease, have your cholesterol tested at least every 5 years.     If you are at risk for diabetes, you should have a diabetes test (fasting glucose).  Shots: Get a flu shot each year. Get a tetanus shot every 10 years.     Nutrition:    Eat at least 5 servings of fruits and vegetables daily.     Eat whole-grain bread, whole-wheat pasta and brown rice instead of white grains and rice.     Get adequate Calcium and Vitamin D.     Lifestyle    Exercise for at least 150 minutes a week (30 minutes a day, 5 days a week). This will help you control your weight and prevent disease.     Limit alcohol to one drink per day.     No smoking.     Wear sunscreen to prevent skin cancer.     See your dentist every six months for an exam and cleaning.             Follow-ups after your visit        Follow-up notes from your care team     Return in about 1 month (around  "1/3/2019) for BP Recheck, nurse check.      Who to contact     If you have questions or need follow up information about today's clinic visit or your schedule please contact [facility] directly at [phone].  Normal or non-critical lab and imaging results will be communicated to you by viblasthart, letter or phone within 4 business days after the clinic has received the results. If you do not hear from us within 7 days, please contact the clinic through viblasthart or phone. If you have a critical or abnormal lab result, we will notify you by phone as soon as possible.  Submit refill requests through [employer] or call your pharmacy and they will forward the refill request to us. Please allow 3 business days for your refill to be completed.          Additional Information About Your Visit        viblastharFivejack Information     [employer] gives you secure access to your electronic health record. If you see a primary care provider, you can also send messages to your care team and make appointments. If you have questions, please call your primary care clinic.  If you do not have a primary care provider, please call [phone] and they will assist you.        Care EveryWhere ID     This is your Care EveryWhere ID. This could be used by other organizations to access your [facility] medical records  [id number]        Your Vitals Were     Pulse Temperature Respirations Height Pulse Oximetry BMI (Body Mass Index)    98 99.8  F (37.7  C) (Oral) 18 5' 8\" (1.727 m) 98% 31.17 kg/m2       Blood Pressure from Last 3 Encounters:   12/03/18 (!) 149/113   11/10/17 130/88   11/22/16 130/76    Weight from Last 3 Encounters:   12/03/18 205 lb (93 kg)   11/10/17 202 lb 8 oz (91.9 kg)   11/22/16 174 lb (78.9 kg)              We Performed the Following     Albumin Random Urine Quantitative with Creat Ratio     Comprehensive metabolic panel     Lipid panel reflex to direct LDL Fasting     Vitamin D Deficiency          Today's Medication " Changes          These changes are accurate as of 12/3/18  2:40 PM.  If you have any questions, ask your nurse or doctor.               Start taking these medicines.        Dose/Directions    lisinopril-hydrochlorothiazide 10-12.5 MG tablet   Commonly known as:  PRINZIDE/ZESTORETIC   Used for:  Hypertension goal BP (blood pressure) < 130/80   Started by:  Wegener, Joel Daniel Irwin, MD        Dose:  1 tablet   Take 1 tablet by mouth daily 1 daily   Quantity:  90 tablet   Refills:  3            Where to get your medicines      These medications were sent to [facility] - [place], [place] - [address] [place] [address]  [address] [facility] [address], [facility] [address]     Phone:  [phone]     lisinopril-hydrochlorothiazide 10-12.5 MG tablet    omeprazole 20 MG DR capsule    sertraline 100 MG tablet                Primary Care Provider Office Phone # Fax #    Joel Daniel Irwin Wegener, [phone] [address] [place] [address]        Equal Access to Services     [facility]: Hadii aad ku hadasho Soomaali, waaxda luqadaha, qaybta kaalmada adeegyada, waxay david hayradha goodman . So [facility] [phone].    ATENCIÓN: Si habla español, tiene a slade disposición servicios gratuitos de asistencia lingüística. [facility] [phone].    We comply with applicable federal civil rights laws and [place] laws. We do not discriminate on the basis of race, color, national origin, age, disability, sex, sexual orientation, or gender identity.            Thank you!     Thank you for choosing [facility]  for your care. Our goal is always to provide you with excellent care. Hearing back from our patients is one way we can continue to improve our services. Please take a few minutes to complete the written survey that you may receive in the mail after your visit with us. Thank you!             Your Updated Medication List - Protect others around you: Learn how to  safely use, store and throw away your medicines at www.disposemymeds.org.          This list is accurate as of 12/3/18  2:40 PM.  Always use your most recent med list.                   Brand Name Dispense Instructions for use Diagnosis    cholecalciferol 1000 units (25 mcg) capsule    VITAMIN D3    90 capsule    Take 1 capsule by mouth daily. 2-3  Tabs daily        DIPHENHYDRAMINE HCL PO      Take 25 mg by mouth nightly as needed        lisinopril-hydrochlorothiazide 10-12.5 MG tablet    PRINZIDE/ZESTORETIC    90 tablet    Take 1 tablet by mouth daily 1 daily    Hypertension goal BP (blood pressure) < 130/80       MELATONIN PO      Take by mouth At Bedtime        Multi-vitamin tablet     100 tablet    Take 1 tablet by mouth daily.        omeprazole 20 MG DR capsule    priLOSEC    90 capsule    Take 1 capsule (20 mg) by mouth 30-60 minutes before a meal.  Generic med only.    Gastroesophageal reflux disease without esophagitis       sertraline 100 MG tablet    ZOLOFT    135 tablet    Take 1.5 tablets (150 mg) by mouth daily    Anxiety and depression

## 2018-12-04 LAB
ALBUMIN SERPL-MCNC: 4.8 G/DL (ref 3.4–5)
ALP SERPL-CCNC: 86 U/L (ref 40–150)
ALT SERPL W P-5'-P-CCNC: 53 U/L (ref 0–70)
ANION GAP SERPL CALCULATED.3IONS-SCNC: 9 MMOL/L (ref 3–14)
AST SERPL W P-5'-P-CCNC: 36 U/L (ref 0–45)
BILIRUB SERPL-MCNC: 0.4 MG/DL (ref 0.2–1.3)
BUN SERPL-MCNC: 15 MG/DL (ref 7–30)
CALCIUM SERPL-MCNC: 9.9 MG/DL (ref 8.5–10.1)
CHLORIDE SERPL-SCNC: 100 MMOL/L (ref 94–109)
CHOLEST SERPL-MCNC: 252 MG/DL
CO2 SERPL-SCNC: 26 MMOL/L (ref 20–32)
CREAT SERPL-MCNC: 1.05 MG/DL (ref 0.66–1.25)
CREAT UR-MCNC: 161 MG/DL
DEPRECATED CALCIDIOL+CALCIFEROL SERPL-MC: 47 UG/L (ref 20–75)
GFR SERPL CREATININE-BSD FRML MDRD: 81 ML/MIN/1.7M2
GLUCOSE SERPL-MCNC: 100 MG/DL (ref 70–99)
HDLC SERPL-MCNC: 44 MG/DL
LDLC SERPL CALC-MCNC: ABNORMAL MG/DL
LDLC SERPL DIRECT ASSAY-MCNC: 147 MG/DL
MICROALBUMIN UR-MCNC: 69 MG/L
MICROALBUMIN/CREAT UR: 42.92 MG/G CR (ref 0–17)
NONHDLC SERPL-MCNC: 208 MG/DL
POTASSIUM SERPL-SCNC: 4.5 MMOL/L (ref 3.4–5.3)
PROT SERPL-MCNC: 8.2 G/DL (ref 6.8–8.8)
SODIUM SERPL-SCNC: 135 MMOL/L (ref 133–144)
TRIGL SERPL-MCNC: 523 MG/DL

## 2019-01-17 ENCOUNTER — ALLIED HEALTH/NURSE VISIT (OUTPATIENT)
Dept: NURSING | Facility: CLINIC | Age: 34
End: 2019-01-17
Payer: COMMERCIAL

## 2019-01-17 VITALS — DIASTOLIC BLOOD PRESSURE: 90 MMHG | SYSTOLIC BLOOD PRESSURE: 124 MMHG | HEART RATE: 70 BPM

## 2019-01-17 DIAGNOSIS — I10 HTN (HYPERTENSION): Primary | ICD-10-CM

## 2019-01-17 PROCEDURE — 99207 ZZC NO CHARGE NURSE ONLY: CPT

## 2019-01-17 NOTE — NURSING NOTE
Viet Beebe is a 33 year old patient who comes in today for a Blood Pressure check.  Initial BP:  BP (!) 134/94 (BP Location: Left arm, Patient Position: Chair, Cuff Size: Adult Large)   Pulse 75      75  Disposition: BP elevated.  Triage RN notified, patient asked to wait.    Itz Farias MA

## 2019-01-17 NOTE — NURSING NOTE
Viet Beebe is a 33 year old year old patient who comes in today for a Blood Pressure check because of ongoing blood pressure monitoring.  Vital Signs as repeated by /90  Patient is taking medication as prescribed  Patient is tolerating medications well.  Patient is not monitoring Blood Pressure at home.  Average readings if yes are NA  Current complaints: none  Disposition:  patient to continue with the same medication and BP reading will be sent to pcp for review and recommendations     Sofi Schroeder Registered Nurse   [facility]

## 2019-01-17 NOTE — Clinical Note
Viet Beebe is a 33 year old patient who comes in today for a Blood Pressure check.Initial BP:  BP (!) 134/94 (BP Location: Left arm, Patient Position: Chair, Cuff Size: Adult Large)   Pulse 75    75Disposition: BP elevated.  Triage RN notified, patient asked to wait. Itz Farias MA

## 2019-01-17 NOTE — Clinical Note
Dr. Wegener - please see bp readings and advise on recommendations Thank you, Sofi Schroeder, Registered Nurse [facility]

## 2020-01-10 ENCOUNTER — OFFICE VISIT (OUTPATIENT)
Dept: FAMILY MEDICINE | Facility: CLINIC | Age: 35
End: 2020-01-10
Payer: COMMERCIAL

## 2020-01-10 VITALS
TEMPERATURE: 98.1 F | HEIGHT: 68 IN | DIASTOLIC BLOOD PRESSURE: 94 MMHG | OXYGEN SATURATION: 97 % | BODY MASS INDEX: 31.83 KG/M2 | RESPIRATION RATE: 24 BRPM | SYSTOLIC BLOOD PRESSURE: 142 MMHG | WEIGHT: 210 LBS | HEART RATE: 93 BPM

## 2020-01-10 DIAGNOSIS — Z00.00 ROUTINE GENERAL MEDICAL EXAMINATION AT A HEALTH CARE FACILITY: Primary | ICD-10-CM

## 2020-01-10 DIAGNOSIS — Z13.6 CARDIOVASCULAR SCREENING; LDL GOAL LESS THAN 160: ICD-10-CM

## 2020-01-10 DIAGNOSIS — F32.A ANXIETY AND DEPRESSION: ICD-10-CM

## 2020-01-10 DIAGNOSIS — F41.9 ANXIETY AND DEPRESSION: ICD-10-CM

## 2020-01-10 DIAGNOSIS — K21.9 GASTROESOPHAGEAL REFLUX DISEASE WITHOUT ESOPHAGITIS: ICD-10-CM

## 2020-01-10 DIAGNOSIS — I10 HYPERTENSION GOAL BP (BLOOD PRESSURE) < 130/80: ICD-10-CM

## 2020-01-10 LAB
ALBUMIN SERPL-MCNC: 4.5 G/DL (ref 3.4–5)
ALP SERPL-CCNC: 69 U/L (ref 40–150)
ALT SERPL W P-5'-P-CCNC: 53 U/L (ref 0–70)
ANION GAP SERPL CALCULATED.3IONS-SCNC: 6 MMOL/L (ref 3–14)
AST SERPL W P-5'-P-CCNC: 25 U/L (ref 0–45)
BILIRUB SERPL-MCNC: 0.5 MG/DL (ref 0.2–1.3)
BUN SERPL-MCNC: 19 MG/DL (ref 7–30)
CALCIUM SERPL-MCNC: 9.7 MG/DL (ref 8.5–10.1)
CHLORIDE SERPL-SCNC: 103 MMOL/L (ref 94–109)
CHOLEST SERPL-MCNC: 218 MG/DL
CO2 SERPL-SCNC: 27 MMOL/L (ref 20–32)
CREAT SERPL-MCNC: 0.99 MG/DL (ref 0.66–1.25)
GFR SERPL CREATININE-BSD FRML MDRD: >90 ML/MIN/{1.73_M2}
GLUCOSE SERPL-MCNC: 91 MG/DL (ref 70–99)
HDLC SERPL-MCNC: 44 MG/DL
LDLC SERPL CALC-MCNC: 112 MG/DL
NONHDLC SERPL-MCNC: 174 MG/DL
POTASSIUM SERPL-SCNC: 4.1 MMOL/L (ref 3.4–5.3)
PROT SERPL-MCNC: 8 G/DL (ref 6.8–8.8)
SODIUM SERPL-SCNC: 136 MMOL/L (ref 133–144)
TRIGL SERPL-MCNC: 310 MG/DL

## 2020-01-10 PROCEDURE — 99213 OFFICE O/P EST LOW 20 MIN: CPT | Mod: 25 | Performed by: FAMILY MEDICINE

## 2020-01-10 PROCEDURE — 80061 LIPID PANEL: CPT | Performed by: FAMILY MEDICINE

## 2020-01-10 PROCEDURE — 82043 UR ALBUMIN QUANTITATIVE: CPT | Performed by: FAMILY MEDICINE

## 2020-01-10 PROCEDURE — 36415 COLL VENOUS BLD VENIPUNCTURE: CPT | Performed by: FAMILY MEDICINE

## 2020-01-10 PROCEDURE — 99395 PREV VISIT EST AGE 18-39: CPT | Performed by: FAMILY MEDICINE

## 2020-01-10 PROCEDURE — 80053 COMPREHEN METABOLIC PANEL: CPT | Performed by: FAMILY MEDICINE

## 2020-01-10 PROCEDURE — 96127 BRIEF EMOTIONAL/BEHAV ASSMT: CPT | Mod: 59 | Performed by: FAMILY MEDICINE

## 2020-01-10 RX ORDER — SERTRALINE HYDROCHLORIDE 100 MG/1
100 TABLET, FILM COATED ORAL DAILY
Qty: 90 TABLET | Refills: 3 | Status: SHIPPED | OUTPATIENT
Start: 2020-01-10 | End: 2020-11-04

## 2020-01-10 RX ORDER — LISINOPRIL AND HYDROCHLOROTHIAZIDE 20; 25 MG/1; MG/1
1 TABLET ORAL DAILY
Qty: 90 TABLET | Refills: 3 | Status: SHIPPED | OUTPATIENT
Start: 2020-01-10 | End: 2020-11-04

## 2020-01-10 RX ORDER — LISINOPRIL/HYDROCHLOROTHIAZIDE 10-12.5 MG
1 TABLET ORAL DAILY
Qty: 90 TABLET | Refills: 3 | Status: CANCELLED | OUTPATIENT
Start: 2020-01-10

## 2020-01-10 RX ORDER — BUPROPION HYDROCHLORIDE 150 MG/1
150 TABLET ORAL EVERY MORNING
Qty: 90 TABLET | Refills: 3 | Status: SHIPPED | OUTPATIENT
Start: 2020-01-10 | End: 2020-11-04

## 2020-01-10 ASSESSMENT — ENCOUNTER SYMPTOMS
NAUSEA: 0
NERVOUS/ANXIOUS: 0
HEARTBURN: 0
JOINT SWELLING: 0
HEADACHES: 0
SORE THROAT: 0
WEAKNESS: 0
CONSTIPATION: 0
ARTHRALGIAS: 0
FREQUENCY: 0
DIZZINESS: 0
EYE PAIN: 0
ABDOMINAL PAIN: 0
COUGH: 0
MYALGIAS: 0
CHILLS: 0
SHORTNESS OF BREATH: 0
PALPITATIONS: 0
DIARRHEA: 0
DYSURIA: 0
HEMATOCHEZIA: 0
HEMATURIA: 0
FEVER: 0
PARESTHESIAS: 0

## 2020-01-10 ASSESSMENT — ANXIETY QUESTIONNAIRES
1. FEELING NERVOUS, ANXIOUS, OR ON EDGE: SEVERAL DAYS
3. WORRYING TOO MUCH ABOUT DIFFERENT THINGS: SEVERAL DAYS
IF YOU CHECKED OFF ANY PROBLEMS ON THIS QUESTIONNAIRE, HOW DIFFICULT HAVE THESE PROBLEMS MADE IT FOR YOU TO DO YOUR WORK, TAKE CARE OF THINGS AT HOME, OR GET ALONG WITH OTHER PEOPLE: NOT DIFFICULT AT ALL
5. BEING SO RESTLESS THAT IT IS HARD TO SIT STILL: NOT AT ALL
GAD7 TOTAL SCORE: 4
7. FEELING AFRAID AS IF SOMETHING AWFUL MIGHT HAPPEN: NOT AT ALL
6. BECOMING EASILY ANNOYED OR IRRITABLE: SEVERAL DAYS
2. NOT BEING ABLE TO STOP OR CONTROL WORRYING: SEVERAL DAYS

## 2020-01-10 ASSESSMENT — PATIENT HEALTH QUESTIONNAIRE - PHQ9
SUM OF ALL RESPONSES TO PHQ QUESTIONS 1-9: 5
5. POOR APPETITE OR OVEREATING: NOT AT ALL

## 2020-01-10 ASSESSMENT — MIFFLIN-ST. JEOR: SCORE: 1859.11

## 2020-01-10 NOTE — PATIENT INSTRUCTIONS
Yearly labs today, had flu shot already.      Discussed mood/stress:     Plan:  Decrease sertraline to 100mg daily.  Add wellbutrin 150mg xl daily  Use jodi alarm clock  Increase vitamin D to 3, 000 units daily over the winter.   Exercise 15 minutes most days of the week.     For blood pressue:  Increase to lisinopril/hctz 20/25.     Follow up 4-6 weeks for re-check of both.           Preventive Health Recommendations  Male Ages 26 - 39    Yearly exam:             See your health care provider every year in order to  o   Review health changes.   o   Discuss preventive care.    o   Review your medicines if your doctor has prescribed any.    You should be tested each year for STDs (sexually transmitted diseases), if you re at risk.     After age 35, talk to your provider about cholesterol testing. If you are at risk for heart disease, have your cholesterol tested at least every 5 years.     If you are at risk for diabetes, you should have a diabetes test (fasting glucose).  Shots: Get a flu shot each year. Get a tetanus shot every 10 years.     Nutrition:    Eat at least 5 servings of fruits and vegetables daily.     Eat whole-grain bread, whole-wheat pasta and brown rice instead of white grains and rice.     Get adequate Calcium and Vitamin D.     Lifestyle    Exercise for at least 150 minutes a week (30 minutes a day, 5 days a week). This will help you control your weight and prevent disease.     Limit alcohol to one drink per day.     No smoking.     Wear sunscreen to prevent skin cancer.     See your dentist every six months for an exam and cleaning.

## 2020-01-10 NOTE — PROGRESS NOTES
SUBJECTIVE:   CC: Viet Beebe is an 34 year old male who presents for preventative health visit.     Healthy Habits:     Getting at least 3 servings of Calcium per day:  Yes    Bi-annual eye exam:  Yes    Dental care twice a year:  Yes    Sleep apnea or symptoms of sleep apnea:  Sleep apnea    Diet:  Regular (no restrictions)    Frequency of exercise:  1 day/week    Duration of exercise:  Less than 15 minutes    Taking medications regularly:  Yes    Medication side effects:  None    PHQ-2 Total Score: 2    Additional concerns today:  No        Hypertension Follow-up      Do you check your blood pressure regularly outside of the clinic? Yes     Are you following a low salt diet? No    Are your blood pressures ever more than 140 on the top number (systolic) OR more   than 90 on the bottom number (diastolic), for example 140/90? Yes sometimes     Depression and Anxiety Follow-Up    How are you doing with your depression since your last visit? Worsened      How are you doing with your anxiety since your last visit?  Worsened     Are you having other symptoms that might be associated with depression or anxiety? No    Have you had a significant life event? No     Do you have any concerns with your use of alcohol or other drugs? No     Feels mood slipping over past three months with increased international travel for work and fun - [place], [place], several others.  Came back and forth for 1-2 weeks at a time for nearly three months.  No major life events .  Works as  now, does enjoy job but travel stressful.   Somewhat worse in winter.  Wondering if sertraline needs to change, on 150mg daily, used to be on 200mg daily.     Not hopeless, not suicidal.     Social History     Tobacco Use     Smoking status: Former Smoker     Types: Cigarettes     Last attempt to quit: 1/1/2010     Years since quitting: 10.0     Smokeless tobacco: Never Used   Substance Use Topics     Alcohol use: Yes     Comment: Drinks  over rhe w/e. 5 drinks weekly     Drug use: Yes     Comment: Marihuana every 3rd day.     PHQ 3/28/2018 12/3/2018 1/10/2020   PHQ-9 Total Score 5 2 5   Q9: Thoughts of better off dead/self-harm past 2 weeks Not at all Not at all Not at all     MONIK-7 SCORE 3/28/2018 3/28/2018 1/10/2020   Total Score 4 (minimal anxiety) 4 (minimal anxiety) -   Total Score 4 4 4           Suicide Assessment Five-step Evaluation and Treatment (SAFE-T)      Today's PHQ-2 Score:   PHQ-2 ( 1999 [employer]) 1/10/2020   Q1: Little interest or pleasure in doing things 1   Q2: Feeling down, depressed or hopeless 1   PHQ-2 Score 2   Q1: Little interest or pleasure in doing things Several days   Q2: Feeling down, depressed or hopeless Several days   PHQ-2 Score 2       Abuse: Current or Past(Physical, Sexual or Emotional)- No  Do you feel safe in your environment? Yes        Social History     Tobacco Use     Smoking status: Former Smoker     Types: Cigarettes     Last attempt to quit: 1/1/2010     Years since quitting: 10.0     Smokeless tobacco: Never Used   Substance Use Topics     Alcohol use: Yes     Comment: Drinks over rhe w/e. 5 drinks weekly     If you drink alcohol do you typically have >3 drinks per day or >7 drinks per week? No    Alcohol Use 1/10/2020   Prescreen: >3 drinks/day or >7 drinks/week? No   Prescreen: >3 drinks/day or >7 drinks/week? -   No flowsheet data found.    Last PSA: No results found for: PSA    Reviewed orders with patient. Reviewed health maintenance and updated orders accordingly - Yes  Lab work is in process    Reviewed and updated as needed this visit by clinical staff  Tobacco  Allergies  Meds  Med Hx  Surg Hx  Fam Hx  Soc Hx        Reviewed and updated as needed this visit by Provider        Past Medical History:   Diagnosis Date     Anxiety and depression     Sees Psychiatrist, [place], [place]. Depression for many years     Hypertension 2012     IBS (irritable bowel syndrome)       Past Surgical History:  "  Procedure Laterality Date     APPENDECTOMY  2007       Review of Systems   Constitutional: Negative for chills and fever.   HENT: Negative for congestion, ear pain, hearing loss and sore throat.    Eyes: Negative for pain and visual disturbance.   Respiratory: Negative for cough and shortness of breath.    Cardiovascular: Negative for chest pain, palpitations and peripheral edema.   Gastrointestinal: Negative for abdominal pain, constipation, diarrhea, heartburn, hematochezia and nausea.   Genitourinary: Negative for discharge, dysuria, frequency, genital sores, hematuria, impotence and urgency.   Musculoskeletal: Negative for arthralgias, joint swelling and myalgias.   Skin: Negative for rash.   Neurological: Negative for dizziness, weakness, headaches and paresthesias.   Psychiatric/Behavioral: Negative for mood changes. The patient is not nervous/anxious.          OBJECTIVE:   BP (!) 142/94 (BP Location: Left arm, Patient Position: Sitting, Cuff Size: Adult Large)   Pulse 93   Temp 98.1  F (36.7  C) (Oral)   Resp 24   Ht 1.715 m (5' 7.5\")   Wt 95.3 kg (210 lb)   SpO2 97%   BMI 32.41 kg/m      Physical Exam  GENERAL: healthy, alert and no distress  EYES: Eyes grossly normal to inspection, PERRL and conjunctivae and sclerae normal  HENT: ear canals and TM's normal, nose and mouth without ulcers or lesions  NECK: no adenopathy, no asymmetry, masses, or scars and thyroid normal to palpation  RESP: lungs clear to auscultation - no rales, rhonchi or wheezes  CV: regular rate and rhythm, normal S1 S2, no S3 or S4, no murmur, click or rub, no peripheral edema and peripheral pulses strong  ABDOMEN: soft, nontender, no hepatosplenomegaly, no masses and bowel sounds normal   (male): normal male genitalia without lesions or urethral discharge, no hernia  MS: no gross musculoskeletal defects noted, no edema  SKIN: no suspicious lesions or rashes  NEURO: Normal strength and tone, mentation intact and speech " "normal  PSYCH: mentation appears normal, affect normal/bright    Diagnostic Test Results:  Labs reviewed in Epic    ASSESSMENT/PLAN:   1. Routine general medical examination at a health care facility  Yearly labs today, had flu shot already.      Discussed mood/stress:     Plan:  Decrease sertraline to 100mg daily.  Add wellbutrin 150mg xl daily  Use jodi alarm clock  Increase vitamin D to 3, 000 units daily over the winter.   Exercise 15 minutes most days of the week.     For blood pressue:  Increase to lisinopril/hctz 20/25.     Follow up 4-6 weeks for re-check of both.       2. Anxiety and depression - uncontrolled.     - sertraline (ZOLOFT) 100 MG tablet; Take 1 tablet (100 mg) by mouth daily  Dispense: 90 tablet; Refill: 3  - buPROPion (WELLBUTRIN XL) 150 MG 24 hr tablet; Take 1 tablet (150 mg) by mouth every morning  Dispense: 90 tablet; Refill: 3    3. Gastroesophageal reflux disease without esophagitis    - omeprazole (PRILOSEC) 20 MG DR capsule; Take 1 capsule (20 mg) by mouth 30-60 minutes before a meal.  Generic med only.  Dispense: 90 capsule; Refill: 3    4. Hypertension goal BP (blood pressure) < 130/80 -uncontrolled.   Repeat similar 140/78 increase as above.   - lisinopril-hydrochlorothiazide (PRINZIDE/ZESTORETIC) 20-25 MG tablet; Take 1 tablet by mouth daily  Dispense: 90 tablet; Refill: 3  - Albumin Random Urine Quantitative with Creat Ratio  - Comprehensive metabolic panel    5. CARDIOVASCULAR SCREENING; LDL GOAL LESS THAN 160    - Lipid panel reflex to direct LDL Fasting    COUNSELING:   Reviewed preventive health counseling, as reflected in patient instructions       Regular exercise       Healthy diet/nutrition    Estimated body mass index is 32.41 kg/m  as calculated from the following:    Height as of this encounter: 1.715 m (5' 7.5\").    Weight as of this encounter: 95.3 kg (210 lb).     Weight management plan: Discussed healthy diet and exercise guidelines     reports that he quit smoking " about 10 years ago. His smoking use included cigarettes. He has never used smokeless tobacco.      Counseling Resources:  ATP IV Guidelines  Pooled Cohorts Equation Calculator  FRAX Risk Assessment  ICSI Preventive Guidelines  Dietary Guidelines for Americans, 2010  USDA's MyPlate  ASA Prophylaxis  Lung CA Screening    Joel Daniel Wegener, MD  [facility]

## 2020-01-11 LAB
CREAT UR-MCNC: 194 MG/DL
MICROALBUMIN UR-MCNC: 30 MG/L
MICROALBUMIN/CREAT UR: 15.36 MG/G CR (ref 0–17)

## 2020-01-11 ASSESSMENT — ANXIETY QUESTIONNAIRES: GAD7 TOTAL SCORE: 4

## 2020-02-25 ENCOUNTER — OFFICE VISIT (OUTPATIENT)
Dept: FAMILY MEDICINE | Facility: CLINIC | Age: 35
End: 2020-02-25
Payer: COMMERCIAL

## 2020-02-25 VITALS
HEIGHT: 68 IN | HEART RATE: 84 BPM | DIASTOLIC BLOOD PRESSURE: 78 MMHG | BODY MASS INDEX: 32.58 KG/M2 | OXYGEN SATURATION: 98 % | SYSTOLIC BLOOD PRESSURE: 130 MMHG | TEMPERATURE: 98.7 F | RESPIRATION RATE: 18 BRPM | WEIGHT: 215 LBS

## 2020-02-25 DIAGNOSIS — F32.A ANXIETY AND DEPRESSION: Primary | ICD-10-CM

## 2020-02-25 DIAGNOSIS — F32.1 CURRENT MODERATE EPISODE OF MAJOR DEPRESSIVE DISORDER WITHOUT PRIOR EPISODE (H): ICD-10-CM

## 2020-02-25 DIAGNOSIS — I10 HYPERTENSION GOAL BP (BLOOD PRESSURE) < 130/80: ICD-10-CM

## 2020-02-25 DIAGNOSIS — F41.9 ANXIETY AND DEPRESSION: Primary | ICD-10-CM

## 2020-02-25 PROCEDURE — 99213 OFFICE O/P EST LOW 20 MIN: CPT | Performed by: FAMILY MEDICINE

## 2020-02-25 ASSESSMENT — PATIENT HEALTH QUESTIONNAIRE - PHQ9: SUM OF ALL RESPONSES TO PHQ QUESTIONS 1-9: 2

## 2020-02-25 ASSESSMENT — MIFFLIN-ST. JEOR: SCORE: 1881.79

## 2020-02-25 NOTE — PATIENT INSTRUCTIONS
"ASSESSMENT AND PLAN  1. Anxiety and depression    Much improved with med changes and jodi alarm clock . Happy with how it is now, no changes desired.     2.  Htn:  No side effects from increase - at goal today, no changes.     Had flu shot already.    Follow up five months depression check.       SAURAV SIGN UP: [email or web address] , 1-504.915.8090    E-VISITS CAN BE DONE FOR CARE/PRESCRIPTIONS WHICH MAY NOT NEED AN IN-PERSON ASSESSMENT - click \"on-line care, then request e-visit\".      ONCARE VISIT/PRESCRIPTIONS: [email or web address]  - we treat nearly 50 common conditions with one hour response time     RADIOLOGY SCHEDULING  [facility]:  [phone]   [facility]: [phone]  [facility]: [phone]    Mammogram and Colonoscopy [phone]    Smoking Cessation: www.quitplan.org, 5-537-833-PLAN (5228)    Pharmacy savings card application: [email or web address].[email or web address]    CONSUMER PRICE LINE for estimates of test costs:  [phone]       "

## 2020-02-25 NOTE — PROGRESS NOTES
Subjective     Viet Beebe is a 34 year old male who presents to clinic today for the following health issues:    HPI   Hypertension Follow-up      Do you check your blood pressure regularly outside of the clinic? Yes     Are you following a low salt diet? No but buys  low sodium     Are your blood pressures ever more than 140 on the top number (systolic) OR more   than 90 on the bottom number (diastolic), for example 140/90? No         Anxiety and depression  Current moderate episode of major depressive disorder without prior episode (H)  Hypertension goal BP (blood pressure) < 130/80 :and mood check.  Adding wellbutrin and decrease zoloft helped substantially along with jodi alarm clock.  No side effects. No insomnia, increased anxiety.     Likes current mix of medicaitions.     No se from blood pressue med increase.         Problem list, Medication list, Allergies, and Medical/Social/Surgical histories reviewed in [facility] and updated as appropriate.  Labs reviewed in EPIC  BP Readings from Last 3 Encounters:   02/25/20 130/78   01/10/20 (!) 142/94   01/17/19 124/90    Wt Readings from Last 3 Encounters:   02/25/20 97.5 kg (215 lb)   01/10/20 95.3 kg (210 lb)   12/03/18 93 kg (205 lb)                  Patient Active Problem List   Diagnosis     CARDIOVASCULAR SCREENING; LDL GOAL LESS THAN 160     Chronic daily headache     Pre-syncope     Anxiety and depression     Snoring     Major depression     YVAN (obstructive sleep apnea)     Past Surgical History:   Procedure Laterality Date     APPENDECTOMY  2007       Social History     Tobacco Use     Smoking status: Former Smoker     Types: Cigarettes     Last attempt to quit: 1/1/2010     Years since quitting: 10.1     Smokeless tobacco: Never Used   Substance Use Topics     Alcohol use: Yes     Comment: Drinks over rhe w/e. 5 drinks weekly     Family History   Problem Relation Age of Onset     Cancer Maternal Grandmother      Heart Disease Maternal Grandfather          "MI     Cerebrovascular Disease Maternal Grandfather 40     Alzheimer Disease Paternal Grandfather 70        Alzheimers         Current Outpatient Medications   Medication Sig Dispense Refill     buPROPion (WELLBUTRIN XL) 150 MG 24 hr tablet Take 1 tablet (150 mg) by mouth every morning 90 tablet 3     cholecalciferol (VITAMIN  -D) 1000 UNITS capsule Take 1 capsule by mouth daily. 2-3  Tabs daily   90 capsule      DIPHENHYDRAMINE HCL PO Take 25 mg by mouth nightly as needed       lisinopril-hydrochlorothiazide (PRINZIDE/ZESTORETIC) 20-25 MG tablet Take 1 tablet by mouth daily 90 tablet 3     MELATONIN PO Take by mouth At Bedtime       multivitamin, therapeutic with minerals (MULTI-VITAMIN) TABS Take 1 tablet by mouth daily. 100 tablet 3     omeprazole (PRILOSEC) 20 MG DR capsule Take 1 capsule (20 mg) by mouth 30-60 minutes before a meal.  Generic med only. 90 capsule 3     sertraline (ZOLOFT) 100 MG tablet Take 1 tablet (100 mg) by mouth daily 90 tablet 3     No Known Allergies  Recent Labs   Lab Test 01/10/20  1645 12/03/18  1449 11/10/17  1554  01/21/13  1704   * Cannot estimate LDL when triglyceride exceeds 400 mg/dL  147* Cannot estimate LDL when triglyceride exceeds 400 mg/dL  129*   < > Cannot estimate LDL when triglyceride exceeds 400 mg/dL  121   HDL 44 44 43   < > 56   TRIG 310* 523* 577*   < > 600*   ALT 53 53 45  --   --    CR 0.99 1.05 0.89   < > 0.94   GFRESTIMATED >90 81 >90   < > >90   GFRESTBLACK >90 >90 >90   < > >90   POTASSIUM 4.1 4.5 3.9   < > 3.9   TSH  --   --   --   --  0.93    < > = values in this interval not displayed.        ROS:  Constitutional, HEENT, cardiovascular, pulmonary, GI, , musculoskeletal, neuro, skin, endocrine and psych systems are negative, except as otherwise noted.        OBJECTIVE:  /78 (BP Location: Right arm, Patient Position: Sitting, Cuff Size: Adult Regular)   Pulse 84   Temp 98.7  F (37.1  C) (Oral)   Resp 18   Ht 1.715 m (5' 7.5\")   Wt 97.5 " "kg (215 lb)   SpO2 98%   BMI 33.18 kg/m      EXAM:  GENERAL APPEARANCE: healthy, alert and no distress  MENTAL STATUS EXAM  Appearance: appropriate  Attitude: cooperative  Behavior: normal  Eyre Contact: normal  Speech: normal  Orientation: oreinted to person , place, time and situation  Mood:  admits little sadness and anxiety most of time  Affect: Mood Congruient  Thought Process: clear  Suicidal Ideation: reports no thoughts, no intention  Hallucination: no      ASSESSMENT AND PLAN  Patient Instructions   ASSESSMENT AND PLAN  1. Anxiety and depression    Much improved with med changes and jodi alarm clock . Happy with how it is now, no changes desired.     2.  Htn:  No side effects from increase - at goal today, no changes.     Had flu shot already.    Follow up five months depression check.       Spare Backup SIGN UP: [email or web address] , 1-790.784.9563    E-VISITS CAN BE DONE FOR CARE/PRESCRIPTIONS WHICH MAY NOT NEED AN IN-PERSON ASSESSMENT - click \"on-line care, then request e-visit\".      ONCARE VISIT/PRESCRIPTIONS: [email or web address]  - we treat nearly 50 common conditions with one hour response time     RADIOLOGY SCHEDULING  [facility]:  [phone]   [facility]: [phone]  [facility]: [phone]    Mammogram and Colonoscopy [phone]    Smoking Cessation: www.quitplan.org, 3-637-585-PLAN (5425)    Pharmacy savings card application: www.ArtsApp    CONSUMER PRICE LINE for estimates of test costs:  [phone]             Joel Wegener, MD        "

## 2020-08-25 ENCOUNTER — VIRTUAL VISIT (OUTPATIENT)
Dept: FAMILY MEDICINE | Facility: CLINIC | Age: 35
End: 2020-08-25
Payer: COMMERCIAL

## 2020-08-25 VITALS — HEIGHT: 68 IN | BODY MASS INDEX: 33.18 KG/M2

## 2020-08-25 DIAGNOSIS — F41.9 ANXIETY AND DEPRESSION: ICD-10-CM

## 2020-08-25 DIAGNOSIS — F32.A ANXIETY AND DEPRESSION: ICD-10-CM

## 2020-08-25 PROCEDURE — 96127 BRIEF EMOTIONAL/BEHAV ASSMT: CPT | Performed by: FAMILY MEDICINE

## 2020-08-25 PROCEDURE — 99214 OFFICE O/P EST MOD 30 MIN: CPT | Mod: TEL | Performed by: FAMILY MEDICINE

## 2020-08-25 ASSESSMENT — ANXIETY QUESTIONNAIRES
7. FEELING AFRAID AS IF SOMETHING AWFUL MIGHT HAPPEN: SEVERAL DAYS
IF YOU CHECKED OFF ANY PROBLEMS ON THIS QUESTIONNAIRE, HOW DIFFICULT HAVE THESE PROBLEMS MADE IT FOR YOU TO DO YOUR WORK, TAKE CARE OF THINGS AT HOME, OR GET ALONG WITH OTHER PEOPLE: NOT DIFFICULT AT ALL
3. WORRYING TOO MUCH ABOUT DIFFERENT THINGS: NOT AT ALL
2. NOT BEING ABLE TO STOP OR CONTROL WORRYING: NOT AT ALL
5. BEING SO RESTLESS THAT IT IS HARD TO SIT STILL: SEVERAL DAYS
1. FEELING NERVOUS, ANXIOUS, OR ON EDGE: NOT AT ALL
6. BECOMING EASILY ANNOYED OR IRRITABLE: NOT AT ALL
GAD7 TOTAL SCORE: 3

## 2020-08-25 ASSESSMENT — PATIENT HEALTH QUESTIONNAIRE - PHQ9
SUM OF ALL RESPONSES TO PHQ QUESTIONS 1-9: 4
5. POOR APPETITE OR OVEREATING: SEVERAL DAYS

## 2020-08-25 NOTE — PATIENT INSTRUCTIONS
"Viet Beebe is a 35 year old male who is being evaluated via a billable telephone visit.      The patient has been notified of following:     \"This [id number] visit will be conducted via a call between you and your physician/provider. We have found that certain health care needs can be provided without the need for a physical exam.  This service lets us provide the care you need with a short phone conversation.  If a prescription is necessary we can send it directly to your pharmacy.  If lab work is needed we can place an order for that and you can then stop by our lab to have the test done at a later time.    Telephone visits are billed at different rates depending on your insurance coverage. During this emergency period, for some insurers they may be billed the same as an in-person visit.  Please reach out to your insurance provider with any questions.    If during the course of the call the physician/provider feels a telephone visit is not appropriate, you will not be charged for this service.\"    Patient has given verbal consent for Telephone visit?  Yes    What phone number would you like to be contacted at? [phone]    How would you like to obtain your AVS? MyChart    Subjective     Viet Beebe is a 35 year old male who presents via phone visit today for the following health issues:    HPI    Depression and Anxiety Follow-Up    How are you doing with your depression since your last visit? No change, dealing with a little depression but possibly just due to being cooped up at home/ COVID    How are you doing with your anxiety since your last visit?  No change    Are you having other symptoms that might be associated with depression or anxiety? Feeling sad a little more often    Have you had a significant life event? No still working, brother just had a baby.     Do you have any concerns with your use of alcohol or other drugs? No    Social History     Tobacco Use     Smoking status: Former Smoker     " Types: Cigarettes     Last attempt to quit: 1/1/2010     Years since quitting: 10.6     Smokeless tobacco: Never Used   Substance Use Topics     Alcohol use: Yes     Comment: Drinks over rhe w/e. 5 drinks weekly     Drug use: Yes     Comment: Marihuana every 3rd day.     PHQ 1/10/2020 2/25/2020 8/25/2020   PHQ-9 Total Score 5 2 4   Q9: Thoughts of better off dead/self-harm past 2 weeks Not at all Not at all Not at all     MONIK-7 SCORE 3/28/2018 1/10/2020 8/25/2020   Total Score 4 (minimal anxiety) - -   Total Score 4 4 3     Last PHQ-9 8/25/2020   1.  Little interest or pleasure in doing things 0   2.  Feeling down, depressed, or hopeless 1   3.  Trouble falling or staying asleep, or sleeping too much 2   4.  Feeling tired or having little energy 1   5.  Poor appetite or overeating 0   6.  Feeling bad about yourself 0   7.  Trouble concentrating 0   8.  Moving slowly or restless 0   Q9: Thoughts of better off dead/self-harm past 2 weeks 0   PHQ-9 Total Score 4   Difficulty at work, home, or with people Not difficult at all     MONIK-7  8/25/2020   1. Feeling nervous, anxious, or on edge 0   2. Not being able to stop or control worrying 0   3. Worrying too much about different things 0   4. Trouble relaxing 1   5. Being so restless that it is hard to sit still 1   6. Becoming easily annoyed or irritable 0   7. Feeling afraid, as if something awful might happen 1   MONIK-7 Total Score 3   If you checked any problems, how difficult have they made it for you to do your work, take care of things at home, or get along with other people? Not difficult at all       Suicide Assessment Five-step Evaluation and Treatment (SAFE-T)      How many servings of fruits and vegetables do you eat daily?  4 or more    On average, how many sweetened beverages do you drink each day (Examples: soda, juice, sweet tea, etc.  Do NOT count diet or artificially sweetened beverages)?   1-2    How many days per week do you exercise enough to make your  "heart beat faster? 3 or less    How many minutes a day do you exercise enough to make your heart beat faster? 30 - 60    How many days per week do you miss taking your medication? 0, maybe once per month will miss on the weekend      Anxiety and depression :overall feels doing well considering impact of covid.  Still enjoying time with family and friends, uses them as \"benchmark\" to some degree. Certainly not hopeless or overly down. No med side effects.  Occasionally takes 150mg sertraline if has had particularly tough few days and that seems to help.         Problem list, Medication list, Allergies, and Medical/Social/Surgical histories reviewed in [facility] and updated as appropriate.  Labs reviewed in EPIC  BP Readings from Last 3 Encounters:   02/25/20 130/78   01/10/20 (!) 142/94   01/17/19 124/90    Wt Readings from Last 3 Encounters:   02/25/20 97.5 kg (215 lb)   01/10/20 95.3 kg (210 lb)   12/03/18 93 kg (205 lb)                  Patient Active Problem List   Diagnosis     CARDIOVASCULAR SCREENING; LDL GOAL LESS THAN 160     Chronic daily headache     Pre-syncope     Anxiety and depression     Snoring     Major depression     YVAN (obstructive sleep apnea)     Past Surgical History:   Procedure Laterality Date     APPENDECTOMY  2007       Social History     Tobacco Use     Smoking status: Former Smoker     Types: Cigarettes     Last attempt to quit: 1/1/2010     Years since quitting: 10.6     Smokeless tobacco: Never Used   Substance Use Topics     Alcohol use: Yes     Comment: Drinks over rhe w/e. 5 drinks weekly     Family History   Problem Relation Age of Onset     Cancer Maternal Grandmother      Heart Disease Maternal Grandfather         MI     Cerebrovascular Disease Maternal Grandfather 40     Alzheimer Disease Paternal Grandfather 70        Alzheimers         Current Outpatient Medications   Medication Sig Dispense Refill     buPROPion (WELLBUTRIN XL) 150 MG 24 hr tablet Take 1 tablet (150 mg) by mouth " "every morning 90 tablet 3     cholecalciferol (VITAMIN  -D) 1000 UNITS capsule Take 1 capsule by mouth daily. 2-3  Tabs daily   90 capsule      DIPHENHYDRAMINE HCL PO Take 25 mg by mouth nightly as needed       lisinopril-hydrochlorothiazide (PRINZIDE/ZESTORETIC) 20-25 MG tablet Take 1 tablet by mouth daily 90 tablet 3     MELATONIN PO Take by mouth At Bedtime       multivitamin, therapeutic with minerals (MULTI-VITAMIN) TABS Take 1 tablet by mouth daily. 100 tablet 3     omeprazole (PRILOSEC) 20 MG DR capsule Take 1 capsule (20 mg) by mouth 30-60 minutes before a meal.  Generic med only. 90 capsule 3     sertraline (ZOLOFT) 100 MG tablet Take 1 tablet (100 mg) by mouth daily 90 tablet 3     No Known Allergies  Recent Labs   Lab Test 01/10/20  1645 12/03/18  1449 11/10/17  1554  01/21/13  1704   * Cannot estimate LDL when triglyceride exceeds 400 mg/dL  147* Cannot estimate LDL when triglyceride exceeds 400 mg/dL  129*   < > Cannot estimate LDL when triglyceride exceeds 400 mg/dL  121   HDL 44 44 43   < > 56   TRIG 310* 523* 577*   < > 600*   ALT 53 53 45  --   --    CR 0.99 1.05 0.89   < > 0.94   GFRESTIMATED >90 81 >90   < > >90   GFRESTBLACK >90 >90 >90   < > >90   POTASSIUM 4.1 4.5 3.9   < > 3.9   TSH  --   --   --   --  0.93    < > = values in this interval not displayed.        ROS:  Constitutional, HEENT, cardiovascular, pulmonary, GI, , musculoskeletal, neuro, skin, endocrine and psych systems are negative, except as otherwise noted.        OBJECTIVE:  Ht 1.715 m (5' 7.5\")   BMI 33.18 kg/m      EXAM:  GENERAL APPEARANCE: healthy, alert and no distress  Clear speech,     Full affect by phone, sounds healthy happy well    ASSESSMENT AND PLAN  1. Anxiety and depression  Controlled by phq9 and his report.  Continue wellbutrin 150mgdaily and 100 to 150mg sertraline.  Do NOT take more wellbutrin if you are taking 150mg of sertraline.      Recommend flu shot in fall, follow up with me for physical duarte " 2021, has refills to last until then.     Start phone 3:18, 9 minutes total.           Joel Wegener, MD

## 2020-08-25 NOTE — PROGRESS NOTES
"Viet Beebe is a 35 year old male who is being evaluated via a billable telephone visit.      The patient has been notified of following:     \"This [id number] visit will be conducted via a call between you and your physician/provider. We have found that certain health care needs can be provided without the need for a physical exam.  This service lets us provide the care you need with a short phone conversation.  If a prescription is necessary we can send it directly to your pharmacy.  If lab work is needed we can place an order for that and you can then stop by our lab to have the test done at a later time.    Telephone visits are billed at different rates depending on your insurance coverage. During this emergency period, for some insurers they may be billed the same as an in-person visit.  Please reach out to your insurance provider with any questions.    If during the course of the call the physician/provider feels a telephone visit is not appropriate, you will not be charged for this service.\"    Patient has given verbal consent for Telephone visit?  Yes    What phone number would you like to be contacted at? [phone]    How would you like to obtain your AVS? MyChart    Subjective     Viet Beebe is a 35 year old male who presents via phone visit today for the following health issues:    HPI    Depression and Anxiety Follow-Up    How are you doing with your depression since your last visit? No change, dealing with a little depression but possibly just due to being cooped up at home/ COVID    How are you doing with your anxiety since your last visit?  No change    Are you having other symptoms that might be associated with depression or anxiety? Feeling sad a little more often    Have you had a significant life event? No still working, brother just had a baby.     Do you have any concerns with your use of alcohol or other drugs? No    Social History     Tobacco Use     Smoking status: Former Smoker     " Types: Cigarettes     Last attempt to quit: 1/1/2010     Years since quitting: 10.6     Smokeless tobacco: Never Used   Substance Use Topics     Alcohol use: Yes     Comment: Drinks over rhe w/e. 5 drinks weekly     Drug use: Yes     Comment: Marihuana every 3rd day.     PHQ 1/10/2020 2/25/2020 8/25/2020   PHQ-9 Total Score 5 2 4   Q9: Thoughts of better off dead/self-harm past 2 weeks Not at all Not at all Not at all     MONIK-7 SCORE 3/28/2018 1/10/2020 8/25/2020   Total Score 4 (minimal anxiety) - -   Total Score 4 4 3     Last PHQ-9 8/25/2020   1.  Little interest or pleasure in doing things 0   2.  Feeling down, depressed, or hopeless 1   3.  Trouble falling or staying asleep, or sleeping too much 2   4.  Feeling tired or having little energy 1   5.  Poor appetite or overeating 0   6.  Feeling bad about yourself 0   7.  Trouble concentrating 0   8.  Moving slowly or restless 0   Q9: Thoughts of better off dead/self-harm past 2 weeks 0   PHQ-9 Total Score 4   Difficulty at work, home, or with people Not difficult at all     MONIK-7  8/25/2020   1. Feeling nervous, anxious, or on edge 0   2. Not being able to stop or control worrying 0   3. Worrying too much about different things 0   4. Trouble relaxing 1   5. Being so restless that it is hard to sit still 1   6. Becoming easily annoyed or irritable 0   7. Feeling afraid, as if something awful might happen 1   MONIK-7 Total Score 3   If you checked any problems, how difficult have they made it for you to do your work, take care of things at home, or get along with other people? Not difficult at all       Suicide Assessment Five-step Evaluation and Treatment (SAFE-T)      How many servings of fruits and vegetables do you eat daily?  4 or more    On average, how many sweetened beverages do you drink each day (Examples: soda, juice, sweet tea, etc.  Do NOT count diet or artificially sweetened beverages)?   1-2    How many days per week do you exercise enough to make your  "heart beat faster? 3 or less    How many minutes a day do you exercise enough to make your heart beat faster? 30 - 60    How many days per week do you miss taking your medication? 0, maybe once per month will miss on the weekend      Anxiety and depression :overall feels doing well considering impact of covid.  Still enjoying time with family and friends, uses them as \"benchmark\" to some degree. Certainly not hopeless or overly down. No med side effects.  Occasionally takes 150mg sertraline if has had particularly tough few days and that seems to help.         Problem list, Medication list, Allergies, and Medical/Social/Surgical histories reviewed in [facility] and updated as appropriate.  Labs reviewed in [facility]  BP Readings from Last 3 Encounters:   02/25/20 130/78   01/10/20 (!) 142/94   01/17/19 124/90    Wt Readings from Last 3 Encounters:   02/25/20 97.5 kg (215 lb)   01/10/20 95.3 kg (210 lb)   12/03/18 93 kg (205 lb)                  Patient Active Problem List   Diagnosis     CARDIOVASCULAR SCREENING; LDL GOAL LESS THAN 160     Chronic daily headache     Pre-syncope     Anxiety and depression     Snoring     Major depression     YVAN (obstructive sleep apnea)     Past Surgical History:   Procedure Laterality Date     APPENDECTOMY  2007       Social History     Tobacco Use     Smoking status: Former Smoker     Types: Cigarettes     Last attempt to quit: 1/1/2010     Years since quitting: 10.6     Smokeless tobacco: Never Used   Substance Use Topics     Alcohol use: Yes     Comment: Drinks over rhe w/e. 5 drinks weekly     Family History   Problem Relation Age of Onset     Cancer Maternal Grandmother      Heart Disease Maternal Grandfather         MI     Cerebrovascular Disease Maternal Grandfather 40     Alzheimer Disease Paternal Grandfather 70        Alzheimers         Current Outpatient Medications   Medication Sig Dispense Refill     buPROPion (WELLBUTRIN XL) 150 MG 24 hr tablet Take 1 tablet (150 mg) by mouth " "every morning 90 tablet 3     cholecalciferol (VITAMIN  -D) 1000 UNITS capsule Take 1 capsule by mouth daily. 2-3  Tabs daily   90 capsule      DIPHENHYDRAMINE HCL PO Take 25 mg by mouth nightly as needed       lisinopril-hydrochlorothiazide (PRINZIDE/ZESTORETIC) 20-25 MG tablet Take 1 tablet by mouth daily 90 tablet 3     MELATONIN PO Take by mouth At Bedtime       multivitamin, therapeutic with minerals (MULTI-VITAMIN) TABS Take 1 tablet by mouth daily. 100 tablet 3     omeprazole (PRILOSEC) 20 MG DR capsule Take 1 capsule (20 mg) by mouth 30-60 minutes before a meal.  Generic med only. 90 capsule 3     sertraline (ZOLOFT) 100 MG tablet Take 1 tablet (100 mg) by mouth daily 90 tablet 3     No Known Allergies  Recent Labs   Lab Test 01/10/20  1645 12/03/18  1449 11/10/17  1554  01/21/13  1704   * Cannot estimate LDL when triglyceride exceeds 400 mg/dL  147* Cannot estimate LDL when triglyceride exceeds 400 mg/dL  129*   < > Cannot estimate LDL when triglyceride exceeds 400 mg/dL  121   HDL 44 44 43   < > 56   TRIG 310* 523* 577*   < > 600*   ALT 53 53 45  --   --    CR 0.99 1.05 0.89   < > 0.94   GFRESTIMATED >90 81 >90   < > >90   GFRESTBLACK >90 >90 >90   < > >90   POTASSIUM 4.1 4.5 3.9   < > 3.9   TSH  --   --   --   --  0.93    < > = values in this interval not displayed.        ROS:  Constitutional, HEENT, cardiovascular, pulmonary, GI, , musculoskeletal, neuro, skin, endocrine and psych systems are negative, except as otherwise noted.        OBJECTIVE:  Ht 1.715 m (5' 7.5\")   BMI 33.18 kg/m      EXAM:  GENERAL APPEARANCE: healthy, alert and no distress  Clear speech,     Full affect by phone, sounds healthy happy well    ASSESSMENT AND PLAN  1. Anxiety and depression  Controlled by phq9 and his report.  Continue wellbutrin 150mgdaily and 100 to 150mg sertraline.  Do NOT take more wellbutrin if you are taking 150mg of sertraline.      Recommend flu shot in fall, follow up with me for physical duarte " 2021, has refills to last until then.     Start phone 3:18, 9 minutes total.           Joel Wegener, MD

## 2020-08-26 ASSESSMENT — ANXIETY QUESTIONNAIRES: GAD7 TOTAL SCORE: 3

## 2020-12-14 ENCOUNTER — HEALTH MAINTENANCE LETTER (OUTPATIENT)
Age: 35
End: 2020-12-14

## 2021-02-27 ENCOUNTER — HEALTH MAINTENANCE LETTER (OUTPATIENT)
Age: 36
End: 2021-02-27

## 2021-03-04 NOTE — PROGRESS NOTES
SUBJECTIVE:   CC: Viet Beebe is an 35 year old male who presents for preventative health visit.       Patient has been advised of split billing requirements and indicates understanding: Yes     Healthy Habits:     Getting at least 3 servings of Calcium per day:  Yes    Bi-annual eye exam:  Yes    Dental care twice a year:  Yes    Sleep apnea or symptoms of sleep apnea:  Daytime drowsiness, Excessive snoring and Sleep apnea    Diet:  Regular (no restrictions)    Frequency of exercise:  1 day/week    Duration of exercise:  Less than 15 minutes    Taking medications regularly:  Yes    Medication side effects:  Not applicable    PHQ-2 Total Score: 2    Additional concerns today:  Yes     PROBLEMS TO ADD ON...  -------------------------------------  Overall well.  Mood suprisingly good despite pandemic he feels.  No change in medications desired.  Has gained some weight with decreased acitvity. \      Has had bad headache followed by vertigo 2-3 times after a lot of screen time, wonders how to manage.       Today's PHQ-2 Score:   PHQ-2 (  [employer]) 3/2/2021   Q1: Little interest or pleasure in doing things 1   Q2: Feeling down, depressed or hopeless 1   PHQ-2 Score 2   Q1: Little interest or pleasure in doing things Several days   Q2: Feeling down, depressed or hopeless Several days   PHQ-2 Score 2       Abuse: Current or Past(Physical, Sexual or Emotional)- No  Do you feel safe in your environment? Yes    Have you ever done Advance Care Planning? (For example, a Health Directive, POLST, or a discussion with a medical provider or your loved ones about your wishes): No, advance care planning information given to patient to review.  Patient declined advance care planning discussion at this time.    Social History     Tobacco Use     Smoking status: Former Smoker     Types: Cigarettes     Quit date: 2010     Years since quittin.1     Smokeless tobacco: Never Used   Substance Use Topics     Alcohol use: Yes      Comment: Drinks over rhe w/e. 5 drinks weekly     If you drink alcohol do you typically have >3 drinks per day or >7 drinks per week? No    Alcohol Use 3/2/2021   Prescreen: >3 drinks/day or >7 drinks/week? No   Prescreen: >3 drinks/day or >7 drinks/week? -   No flowsheet data found.    Last PSA: No results found for: PSA    Reviewed orders with patient. Reviewed health maintenance and updated orders accordingly - Yes      Reviewed and updated as needed this visit by clinical staff                 Reviewed and updated as needed this visit by Provider                Past Medical History:   Diagnosis Date     Anxiety and depression     Sees Psychiatrist, [place], [place]. Depression for many years     Hypertension 2012     IBS (irritable bowel syndrome)       Past Surgical History:   Procedure Laterality Date     APPENDECTOMY  2007       Review of Systems  CONSTITUTIONAL: NEGATIVE for fever, chills, change in weight  INTEGUMENTARY/SKIN: NEGATIVE for worrisome rashes, moles or lesions  EYES: NEGATIVE for vision changes or irritation  ENT: NEGATIVE for ear, mouth and throat problems  RESP: NEGATIVE for significant cough or SOB  CV: NEGATIVE for chest pain, palpitations or peripheral edema  GI: NEGATIVE for nausea, abdominal pain, heartburn, or change in bowel habits   male: negative for dysuria, hematuria, decreased urinary stream, erectile dysfunction, urethral discharge  MUSCULOSKELETAL: NEGATIVE for significant arthralgias or myalgia  NEURO: NEGATIVE for weakness, dizziness or paresthesias  PSYCHIATRIC: NEGATIVE for changes in mood or affect    OBJECTIVE:   There were no vitals taken for this visit.    Physical Exam  GENERAL: healthy, alert and no distress  EYES: Eyes grossly normal to inspection, PERRL and conjunctivae and sclerae normal  HENT: ear canals and TM's normal, nose and mouth without ulcers or lesions  NECK: no adenopathy, no asymmetry, masses, or scars and thyroid normal to palpation  RESP: lungs clear to  auscultation - no rales, rhonchi or wheezes  CV: regular rate and rhythm, normal S1 S2, no S3 or S4, no murmur, click or rub, no peripheral edema and peripheral pulses strong  ABDOMEN: soft, nontender, no hepatosplenomegaly, no masses and bowel sounds normal   (male): normal male genitalia without lesions or urethral discharge, no hernia  MS: no gross musculoskeletal defects noted, no edema  SKIN: no suspicious lesions or rashes  NEURO: Normal strength and tone, mentation intact and speech normal  PSYCH: mentation appears normal, affect normal/bright        ASSESSMENT/PLAN:   1. Routine general medical examination at a health care facility  Reviewed preventive health/immunizations, flu shot today.         2. Current moderate episode of major depressive disorder without prior episode (H)  Controlled.  Continue medications as below.     3. Migraine with aura and without status migrainosus, not intractable  Based on classic trigger( screen) and aura (vertigo and nausea). Trial sumatriptan, discussed how to use as an abortive medication. Discussed stroke risk with sumatriptan if used more than prescribed.     - SUMAtriptan (IMITREX) 50 MG tablet; Take 1 tablet (50 mg) by mouth at onset of headache for migraine May repeat in 2 hours. Max 4 tablets/24 hours.  Dispense: 18 tablet; Refill: 3    4. Anxiety and depression    - buPROPion (WELLBUTRIN XL) 150 MG 24 hr tablet; Take 1 tablet (150 mg) by mouth every morning  Dispense: 90 tablet; Refill: 3  - sertraline (ZOLOFT) 100 MG tablet; Take 1 tablet (100 mg) by mouth daily  Dispense: 90 tablet; Refill: 3    5. Hypertension goal BP (blood pressure) < 130/80  Controlled with re-chec.   - lisinopril-hydrochlorothiazide (ZESTORETIC) 20-25 MG tablet; Take 1 tablet by mouth daily  Dispense: 90 tablet; Refill: 3  - Albumin Random Urine Quantitative with Creat Ratio  - Comprehensive metabolic panel    6. Gastroesophageal reflux disease without esophagitis    - omeprazole  "(PRILOSEC) 20 MG DR capsule; TAKE ONE CAPSULE BY MOUTH 30 TO 60 MINUTES BEFORE A MEAL  Dispense: 90 capsule; Refill: 3    7. Need for influenza vaccination    - INFLUENZA VACCINE IM > 6 MONTHS VALENT IIV4 [31827]    8. Need for hepatitis C screening test  Agreed.   - Hepatitis C Screen Reflex to HCV RNA Quant and Genotype    9. CARDIOVASCULAR SCREENING; LDL GOAL LESS THAN 160    - Lipid panel reflex to direct LDL Fasting    Patient has been advised of split billing requirements and indicates understanding: Yes  COUNSELING:   Reviewed preventive health counseling, as reflected in patient instructions       Regular exercise       Healthy diet/nutrition       Colon cancer screening    Estimated body mass index is 33.18 kg/m  as calculated from the following:    Height as of 8/25/20: 1.715 m (5' 7.5\").    Weight as of 2/25/20: 97.5 kg (215 lb).     Weight management plan: Discussed healthy diet and exercise guidelines    He reports that he quit smoking about 11 years ago. His smoking use included cigarettes. He has never used smokeless tobacco.      Counseling Resources:  ATP IV Guidelines  Pooled Cohorts Equation Calculator  FRAX Risk Assessment  ICSI Preventive Guidelines  Dietary Guidelines for Americans, 2010  USDA's MyPlate  ASA Prophylaxis  Lung CA Screening    Joel Daniel Wegener, MD  [facility]  "

## 2021-03-05 ENCOUNTER — OFFICE VISIT (OUTPATIENT)
Dept: FAMILY MEDICINE | Facility: CLINIC | Age: 36
End: 2021-03-05
Payer: COMMERCIAL

## 2021-03-05 VITALS
RESPIRATION RATE: 14 BRPM | OXYGEN SATURATION: 98 % | SYSTOLIC BLOOD PRESSURE: 122 MMHG | WEIGHT: 220 LBS | DIASTOLIC BLOOD PRESSURE: 82 MMHG | HEART RATE: 84 BPM | HEIGHT: 69 IN | BODY MASS INDEX: 32.58 KG/M2

## 2021-03-05 DIAGNOSIS — Z00.00 ROUTINE GENERAL MEDICAL EXAMINATION AT A HEALTH CARE FACILITY: Primary | ICD-10-CM

## 2021-03-05 DIAGNOSIS — F32.A ANXIETY AND DEPRESSION: ICD-10-CM

## 2021-03-05 DIAGNOSIS — I10 HYPERTENSION GOAL BP (BLOOD PRESSURE) < 130/80: ICD-10-CM

## 2021-03-05 DIAGNOSIS — Z11.59 NEED FOR HEPATITIS C SCREENING TEST: ICD-10-CM

## 2021-03-05 DIAGNOSIS — K21.9 GASTROESOPHAGEAL REFLUX DISEASE WITHOUT ESOPHAGITIS: ICD-10-CM

## 2021-03-05 DIAGNOSIS — F41.9 ANXIETY AND DEPRESSION: ICD-10-CM

## 2021-03-05 DIAGNOSIS — Z13.6 CARDIOVASCULAR SCREENING; LDL GOAL LESS THAN 160: ICD-10-CM

## 2021-03-05 DIAGNOSIS — F32.1 CURRENT MODERATE EPISODE OF MAJOR DEPRESSIVE DISORDER WITHOUT PRIOR EPISODE (H): ICD-10-CM

## 2021-03-05 DIAGNOSIS — G43.109 MIGRAINE WITH AURA AND WITHOUT STATUS MIGRAINOSUS, NOT INTRACTABLE: ICD-10-CM

## 2021-03-05 DIAGNOSIS — Z23 NEED FOR INFLUENZA VACCINATION: ICD-10-CM

## 2021-03-05 PROCEDURE — 86803 HEPATITIS C AB TEST: CPT | Performed by: FAMILY MEDICINE

## 2021-03-05 PROCEDURE — 90686 IIV4 VACC NO PRSV 0.5 ML IM: CPT | Performed by: FAMILY MEDICINE

## 2021-03-05 PROCEDURE — 80053 COMPREHEN METABOLIC PANEL: CPT | Performed by: FAMILY MEDICINE

## 2021-03-05 PROCEDURE — 90471 IMMUNIZATION ADMIN: CPT | Performed by: FAMILY MEDICINE

## 2021-03-05 PROCEDURE — 82043 UR ALBUMIN QUANTITATIVE: CPT | Performed by: FAMILY MEDICINE

## 2021-03-05 PROCEDURE — 99213 OFFICE O/P EST LOW 20 MIN: CPT | Mod: 25 | Performed by: FAMILY MEDICINE

## 2021-03-05 PROCEDURE — 99395 PREV VISIT EST AGE 18-39: CPT | Mod: 25 | Performed by: FAMILY MEDICINE

## 2021-03-05 PROCEDURE — 36415 COLL VENOUS BLD VENIPUNCTURE: CPT | Performed by: FAMILY MEDICINE

## 2021-03-05 PROCEDURE — 83721 ASSAY OF BLOOD LIPOPROTEIN: CPT | Mod: 59 | Performed by: FAMILY MEDICINE

## 2021-03-05 PROCEDURE — 80061 LIPID PANEL: CPT | Performed by: FAMILY MEDICINE

## 2021-03-05 RX ORDER — BUPROPION HYDROCHLORIDE 150 MG/1
150 TABLET ORAL EVERY MORNING
Qty: 90 TABLET | Refills: 3 | Status: SHIPPED | OUTPATIENT
Start: 2021-03-05 | End: 2021-07-06

## 2021-03-05 RX ORDER — SERTRALINE HYDROCHLORIDE 100 MG/1
100 TABLET, FILM COATED ORAL DAILY
Qty: 90 TABLET | Refills: 3 | Status: SHIPPED | OUTPATIENT
Start: 2021-03-05 | End: 2021-07-20

## 2021-03-05 RX ORDER — SUMATRIPTAN 50 MG/1
50 TABLET, FILM COATED ORAL
Qty: 18 TABLET | Refills: 3 | Status: SHIPPED | OUTPATIENT
Start: 2021-03-05 | End: 2021-12-03

## 2021-03-05 RX ORDER — LISINOPRIL AND HYDROCHLOROTHIAZIDE 20; 25 MG/1; MG/1
1 TABLET ORAL DAILY
Qty: 90 TABLET | Refills: 3 | Status: SHIPPED | OUTPATIENT
Start: 2021-03-05 | End: 2022-02-07

## 2021-03-05 ASSESSMENT — PAIN SCALES - GENERAL: PAINLEVEL: NO PAIN (0)

## 2021-03-05 ASSESSMENT — MIFFLIN-ST. JEOR: SCORE: 1923.29

## 2021-03-08 LAB
ALBUMIN SERPL-MCNC: 4.5 G/DL (ref 3.4–5)
ALP SERPL-CCNC: 77 U/L (ref 40–150)
ALT SERPL W P-5'-P-CCNC: 53 U/L (ref 0–70)
ANION GAP SERPL CALCULATED.3IONS-SCNC: 5 MMOL/L (ref 3–14)
AST SERPL W P-5'-P-CCNC: 31 U/L (ref 0–45)
BILIRUB SERPL-MCNC: 0.4 MG/DL (ref 0.2–1.3)
BUN SERPL-MCNC: 15 MG/DL (ref 7–30)
CALCIUM SERPL-MCNC: 10 MG/DL (ref 8.5–10.1)
CHLORIDE SERPL-SCNC: 100 MMOL/L (ref 94–109)
CHOLEST SERPL-MCNC: 265 MG/DL
CO2 SERPL-SCNC: 29 MMOL/L (ref 20–32)
CREAT SERPL-MCNC: 0.88 MG/DL (ref 0.66–1.25)
CREAT UR-MCNC: 206 MG/DL
GFR SERPL CREATININE-BSD FRML MDRD: >90 ML/MIN/{1.73_M2}
GLUCOSE SERPL-MCNC: 93 MG/DL (ref 70–99)
HCV AB SERPL QL IA: NONREACTIVE
HDLC SERPL-MCNC: 34 MG/DL
LDLC SERPL CALC-MCNC: ABNORMAL MG/DL
LDLC SERPL DIRECT ASSAY-MCNC: 155 MG/DL
MICROALBUMIN UR-MCNC: 18 MG/L
MICROALBUMIN/CREAT UR: 8.59 MG/G CR (ref 0–17)
NONHDLC SERPL-MCNC: 231 MG/DL
POTASSIUM SERPL-SCNC: 3.9 MMOL/L (ref 3.4–5.3)
PROT SERPL-MCNC: 8.3 G/DL (ref 6.8–8.8)
SODIUM SERPL-SCNC: 134 MMOL/L (ref 133–144)
TRIGL SERPL-MCNC: 472 MG/DL

## 2021-06-24 ENCOUNTER — MYC MEDICAL ADVICE (OUTPATIENT)
Dept: FAMILY MEDICINE | Facility: CLINIC | Age: 36
End: 2021-06-24

## 2021-06-28 NOTE — TELEPHONE ENCOUNTER
DIANA ([facility]),    Please address [employer] message below due to JW's absence.    Thanks,  Ayla Lang RN

## 2021-06-28 NOTE — TELEPHONE ENCOUNTER
I recommend a follow up visit specifically about the headaches, as the last visit was brief and in the context of a physical.  Could be virtual if unable to come in.  Can discuss medications and imagine if needed

## 2021-07-01 NOTE — PROGRESS NOTES
Assessment & Plan     Migraine without status migrainosus, not intractable, unspecified migraine type  Headache (likely migraine) with  Nausea chronic.    -do braiin MRI to exclude mass    - change from wellbutrin to venlafaxine once daiily (preventive med).    - change from oral to nasal sumatriptan (more effective with nausea)    - follow up one month    -be vigilant to avoid excessive over the counter med use/rebound headache.     RADIOLOGY:  [facility]:  [phone]   [facility]: [phone]      - venlafaxine (EFFEXOR-ER) 75 MG 24 hr tablet  Dispense: 90 tablet; Refill: 3  - SUMAtriptan (IMITREX) 5 MG/ACT nasal spray  Dispense: 3 each; Refill: 3    Chronic daily headache    - MR Brain w/o Contrast    Moderate episode of recurrent major depressive disorder (H)    - venlafaxine (EFFEXOR-ER) 75 MG 24 hr tablet  Dispense: 90 tablet; Refill: 3    Nausea    - MR Brain w/o Contrast      Addendum 07/15/21 based on [employer] message:How long should it take for the headaches to get better?  They are still getting worse, and i feel more light headed. When I look both ways at a stop sign, turning my head fairly fast (but nothing abnormal), I feel really light headed and have to almost close my eyes for a second, like a white out or someone hit me in the head.  I am also having real trouble when I have to really think hard, its a very sharp pain between my tempels.      Updated assessment/plan:  My concern is regarding chronic > 6 weeks daily worsening headache with symptoms of increased intracranial pressure (nausea) and now additional nervous system symptoms of lightheaded/vertigo and also change in cognitive ability.     Will obtain prior authorization for MRI and proceed.     Joel Wegener,MD              Return in about 1 month (around 8/6/2021).    Joel Daniel Wegener, MD  [facility]    Rasheed Llanos is a 36 year old who presents for the following health issues     HPI  "    Headache/Migraine  Onset/Duration: x6 months   Description  Location: bilateral in the frontal area   Character: dull pain, sharp at times   Frequency: was infrequent, now every afternoon/evening  Duration:  entire day   Wake with headaches: no  Able to do daily activities when headache present: YES  Intensity:  mild  Progression of Symptoms:  Worsening slowly   Accompanying signs and symptoms:  Stiff neck: no  Neck or upper back pain: no  Sinus or URI symptoms: pt wakes up feeling sick, mostly nausea/throwing up bile - worse with stress  Fever: no  Nausea or vomiting: YES- vomiting  Dizziness: YES - lightheaded  Numbness/tingling: no  Weakness: no  Visual changes: none  History  Head trauma: no  Family history of migraines: no  Daily pain medication use: YES- acetaminophen  Previous tests for headaches: no  Neurologist evaluation: no  Precipitating or Alleviating factors (light/sound/sleep/caffeine): sensitive to light, worse when moving head  Therapies tried and outcome: Imitrex, acetaminophen              Outcome - helpful    Using tylenol OR nsaids about 2/3 of days twice daily.     Photophobia, scotoma still with headaches and now nausea.         Review of Systems         Objective    /82 (Patient Position: Sitting, Cuff Size: Adult Regular)   Pulse 71   Temp 98.7  F (37.1  C) (Tympanic)   Resp 22   Ht 1.753 m (5' 9\")   Wt 94.4 kg (208 lb 3.2 oz)   SpO2 96%   BMI 30.75 kg/m    Body mass index is 30.75 kg/m .  Physical Exam   Cn 2-12 intact.   Clear speech.   Follows conversation well.                 Joel Wegener,MD   "

## 2021-07-06 ENCOUNTER — OFFICE VISIT (OUTPATIENT)
Dept: FAMILY MEDICINE | Facility: CLINIC | Age: 36
End: 2021-07-06
Payer: COMMERCIAL

## 2021-07-06 VITALS
SYSTOLIC BLOOD PRESSURE: 122 MMHG | HEART RATE: 71 BPM | HEIGHT: 69 IN | WEIGHT: 208.2 LBS | BODY MASS INDEX: 30.84 KG/M2 | OXYGEN SATURATION: 96 % | RESPIRATION RATE: 22 BRPM | DIASTOLIC BLOOD PRESSURE: 82 MMHG | TEMPERATURE: 98.7 F

## 2021-07-06 DIAGNOSIS — R11.0 NAUSEA: ICD-10-CM

## 2021-07-06 DIAGNOSIS — G43.909 MIGRAINE WITHOUT STATUS MIGRAINOSUS, NOT INTRACTABLE, UNSPECIFIED MIGRAINE TYPE: ICD-10-CM

## 2021-07-06 DIAGNOSIS — F33.1 MODERATE EPISODE OF RECURRENT MAJOR DEPRESSIVE DISORDER (H): Primary | ICD-10-CM

## 2021-07-06 DIAGNOSIS — R51.9 CHRONIC DAILY HEADACHE: ICD-10-CM

## 2021-07-06 PROCEDURE — 99214 OFFICE O/P EST MOD 30 MIN: CPT | Performed by: FAMILY MEDICINE

## 2021-07-06 RX ORDER — VENLAFAXINE HYDROCHLORIDE 75 MG/1
75 TABLET, EXTENDED RELEASE ORAL DAILY
Qty: 90 TABLET | Refills: 3 | Status: SHIPPED | OUTPATIENT
Start: 2021-07-06 | End: 2022-10-24

## 2021-07-06 RX ORDER — SUMATRIPTAN 5 MG/1
1 SPRAY NASAL PRN
Qty: 3 EACH | Refills: 3 | Status: SHIPPED | OUTPATIENT
Start: 2021-07-06 | End: 2021-08-17

## 2021-07-06 ASSESSMENT — ANXIETY QUESTIONNAIRES
2. NOT BEING ABLE TO STOP OR CONTROL WORRYING: SEVERAL DAYS
GAD7 TOTAL SCORE: 6
IF YOU CHECKED OFF ANY PROBLEMS ON THIS QUESTIONNAIRE, HOW DIFFICULT HAVE THESE PROBLEMS MADE IT FOR YOU TO DO YOUR WORK, TAKE CARE OF THINGS AT HOME, OR GET ALONG WITH OTHER PEOPLE: SOMEWHAT DIFFICULT
1. FEELING NERVOUS, ANXIOUS, OR ON EDGE: SEVERAL DAYS
5. BEING SO RESTLESS THAT IT IS HARD TO SIT STILL: SEVERAL DAYS
6. BECOMING EASILY ANNOYED OR IRRITABLE: SEVERAL DAYS
7. FEELING AFRAID AS IF SOMETHING AWFUL MIGHT HAPPEN: NOT AT ALL
3. WORRYING TOO MUCH ABOUT DIFFERENT THINGS: SEVERAL DAYS

## 2021-07-06 ASSESSMENT — PATIENT HEALTH QUESTIONNAIRE - PHQ9
5. POOR APPETITE OR OVEREATING: SEVERAL DAYS
SUM OF ALL RESPONSES TO PHQ QUESTIONS 1-9: 6

## 2021-07-06 ASSESSMENT — MIFFLIN-ST. JEOR: SCORE: 1864.77

## 2021-07-06 NOTE — PATIENT INSTRUCTIONS
Headache (likely migraine) with  Nausea chronic.    -do braiin MRI to exclude mass    - change from wellbutrin to venlafaxine once daiily (preventive med).    - change from oral to nasal sumatriptan (more effective with nausea)    - follow up one month    -be vigilant to avoid excessive over the counter med use/rebound headache.     RADIOLOGY:  [facility]:  [phone]   [facility]: [phone]

## 2021-07-07 ASSESSMENT — ANXIETY QUESTIONNAIRES: GAD7 TOTAL SCORE: 6

## 2021-07-13 ENCOUNTER — MYC MEDICAL ADVICE (OUTPATIENT)
Dept: FAMILY MEDICINE | Facility: CLINIC | Age: 36
End: 2021-07-13

## 2021-07-15 NOTE — TELEPHONE ENCOUNTER
[facility] team Jenny ph# [phone], Elisabeth ph# [phone].  LM on Jenny's VM to return call.

## 2021-07-15 NOTE — TELEPHONE ENCOUNTER
Triage:    Please call to obtain MRI prior authorization (previously denied):    My concern is regarding chronic > 6 weeks daily worsening headache with symptoms of increased intracranial pressure (nausea) and now additional nervous system symptoms of lightheaded/vertigo and also change in cognitive ability.     I added an addendum to note dated 07/06/21 which can be sent if needed.     See initial MRI denial info as below:      The authorization for procedure MRI Brain w/o Contrast on date of service 07/17/21 has been denied. We were unsuccessful in obtaining approval through clinical review. A [id number] review can be done by calling the insurance/third party authorization vendor with the following information:     Insurance: [facility]   Auth Vendor: [facility]   Phone: [phone] opt 3   Due: ASAP     Patient ID: [id number]   Case/Ref #: [id number]     Patient contact: No   Patient response:   Patient Phone #: [phone]        Joel Wegener,MD

## 2021-07-16 NOTE — TELEPHONE ENCOUNTER
Pt just called as well  He has MRI scheduled for tomorrow  Told pt he can go ahead with MRI in hopes we get it covered, but if not he'd be responsible for the cost  Otherwise, he'd have to reschedule until there is an answer from insurance  Could hear pt crying on the other end    Looks like Dr Wegener currently working on this now  Sofía WALKER RN

## 2021-07-17 ENCOUNTER — HOSPITAL ENCOUNTER (OUTPATIENT)
Dept: MRI IMAGING | Facility: CLINIC | Age: 36
Discharge: HOME | End: 2021-07-17
Attending: FAMILY MEDICINE | Admitting: FAMILY MEDICINE
Payer: COMMERCIAL

## 2021-07-17 DIAGNOSIS — R11.0 NAUSEA: ICD-10-CM

## 2021-07-17 DIAGNOSIS — R51.9 CHRONIC DAILY HEADACHE: ICD-10-CM

## 2021-07-17 PROCEDURE — 70551 MRI BRAIN STEM W/O DYE: CPT

## 2021-07-20 ENCOUNTER — MYC MEDICAL ADVICE (OUTPATIENT)
Dept: FAMILY MEDICINE | Facility: CLINIC | Age: 36
End: 2021-07-20

## 2021-07-20 DIAGNOSIS — F32.A ANXIETY AND DEPRESSION: ICD-10-CM

## 2021-07-20 DIAGNOSIS — G44.40 REBOUND HEADACHE: Primary | ICD-10-CM

## 2021-07-20 DIAGNOSIS — F41.9 ANXIETY AND DEPRESSION: ICD-10-CM

## 2021-07-20 RX ORDER — PREDNISONE 20 MG/1
40 TABLET ORAL DAILY
Qty: 10 TABLET | Refills: 0 | Status: SHIPPED | OUTPATIENT
Start: 2021-07-20 | End: 2021-07-25

## 2021-07-20 RX ORDER — SERTRALINE HYDROCHLORIDE 100 MG/1
100 TABLET, FILM COATED ORAL DAILY
Qty: 90 TABLET | Refills: 3 | Status: SHIPPED | OUTPATIENT
Start: 2021-07-20 | End: 2022-05-18

## 2021-07-29 NOTE — TELEPHONE ENCOUNTER
"My chart message sent but keep an eye out if patient responds differently/with updated symptoms/concerns.    Thanks  Alexa \"Vishal\" JOE Boyd   "

## 2021-08-11 NOTE — PROGRESS NOTES
Viet is a 36 year old who is being evaluated via a billable video visit.      How would you like to obtain your AVS? MyChart  If the video visit is dropped, the invitation should be resent by: Text to cell phone: [phone] (M)   Will anyone else be joining your video visit? No      Video Start Time: 1:10    Assessment & Plan     Migraine without status migrainosus, not intractable, unspecified migraine type  Returned to baseline now headaches 1-2 times week past two weeks, treated well with oral sumatriptan.  Will plan to use oral sumatriptan only, over the counter acetaminophen and ibuprofen.  Reviewed risks of rebound headache development again with frequent daily use which he understands.  Ok to use 1-2 doses of each every few days.  Currently needing to use much less than that.     Likely combination of underlying migraine with sinus headache and possibly rebound headache.      Continue current combination of sertraline and venlafaxine for mood and migraine prevention.     Chronic daily headache  Resolved.     Rebound headache  Resolved.     Sinus headache  Substantial improvement after MRI showed sinus inflammation then given antibiotics for sinusitis.     Also reviewed that it is true that nasal sumatriptan can cause sinus pain listed as a side effect in up to date so it may have ironically been a trigger.     Thankfully no more nausea so not needing to use nasal triptan.                    No follow-ups on file.    Joel Daniel Wegener, MD  [facility]   Viet is a 36 year old who presents for the following health issues      see plan for additional hpi      Review of Systems         Objective           Vitals:  No vitals were obtained today due to virtual visit.    Physical Exam   GENERAL: Healthy, alert and no distress  EYES: Eyes grossly normal to inspection.  No discharge or erythema, or obvious scleral/conjunctival abnormalities.  RESP: No audible wheeze, cough, or  visible cyanosis.  No visible retractions or increased work of breathing.    SKIN: Visible skin clear. No significant rash, abnormal pigmentation or lesions.  NEURO: Cranial nerves grossly intact.  Mentation and speech appropriate for age.  PSYCH: Mentation appears normal, affect normal/bright, judgement and insight intact, normal speech and appearance well-groomed.                Video-Visit Details    Type of service:  Video Visit    Video End Time:1:20    Originating Location (pt. Location): Home    Distant Location (provider location):  [facility]     Platform used for Video Visit: Moven

## 2021-08-12 ENCOUNTER — VIRTUAL VISIT (OUTPATIENT)
Dept: FAMILY MEDICINE | Facility: CLINIC | Age: 36
End: 2021-08-12
Payer: COMMERCIAL

## 2021-08-12 DIAGNOSIS — R51.9 CHRONIC DAILY HEADACHE: ICD-10-CM

## 2021-08-12 DIAGNOSIS — G43.909 MIGRAINE WITHOUT STATUS MIGRAINOSUS, NOT INTRACTABLE, UNSPECIFIED MIGRAINE TYPE: Primary | ICD-10-CM

## 2021-08-12 DIAGNOSIS — G44.40 REBOUND HEADACHE: ICD-10-CM

## 2021-08-12 DIAGNOSIS — R51.9 SINUS HEADACHE: ICD-10-CM

## 2021-08-12 PROCEDURE — 99213 OFFICE O/P EST LOW 20 MIN: CPT | Mod: 95 | Performed by: FAMILY MEDICINE

## 2021-08-14 ENCOUNTER — MYC MEDICAL ADVICE (OUTPATIENT)
Dept: FAMILY MEDICINE | Facility: CLINIC | Age: 36
End: 2021-08-14

## 2021-08-15 ENCOUNTER — MYC MEDICAL ADVICE (OUTPATIENT)
Dept: FAMILY MEDICINE | Facility: CLINIC | Age: 36
End: 2021-08-15

## 2021-08-16 NOTE — TELEPHONE ENCOUNTER
JW,    Please see [employer] message below.  Patient has sent 2 - one 8/14 and another one today.    Ayla Lang RN

## 2021-08-16 NOTE — TELEPHONE ENCOUNTER
JW,    Please see [employer] message below.  Patient responding to [place] sent 8/14.    Thanks,  Ayla Lang RN

## 2021-08-23 ENCOUNTER — MYC MEDICAL ADVICE (OUTPATIENT)
Dept: FAMILY MEDICINE | Facility: CLINIC | Age: 36
End: 2021-08-23

## 2021-08-23 DIAGNOSIS — G43.909 MIGRAINE WITHOUT STATUS MIGRAINOSUS, NOT INTRACTABLE, UNSPECIFIED MIGRAINE TYPE: ICD-10-CM

## 2021-08-25 RX ORDER — SUMATRIPTAN 5 MG/1
1 SPRAY NASAL
Qty: 3 EACH | Refills: 3 | Status: SHIPPED | OUTPATIENT
Start: 2021-08-25 | End: 2022-05-18

## 2021-08-25 NOTE — TELEPHONE ENCOUNTER
JW  Please see [employer] message  Sumatriptan nasal spray not on current med list, only tabs are on list.    Ok to send in a new Rx for the spray?    Thank you,  China Traore RN

## 2021-10-02 ENCOUNTER — HEALTH MAINTENANCE LETTER (OUTPATIENT)
Age: 36
End: 2021-10-02

## 2022-03-25 ENCOUNTER — TELEPHONE (OUTPATIENT)
Dept: BEHAVIORAL HEALTH | Facility: CLINIC | Age: 37
End: 2022-03-25
Payer: COMMERCIAL

## 2022-03-25 NOTE — TELEPHONE ENCOUNTER
"  [facility] nScaled PHQ-9 Follow-up  Behavioral Health Clinician Triage Service    nScaled PHQ-9 Responses:  [facility] Follow-up to PHQ 8/25/2020 7/6/2021 3/19/2022   PHQ-9 9. Suicide Ideation past 2 weeks Not at all Not at all Several days   Thoughts of suicide or self harm in past 2 weeks - - No   Thoughts of suicide or self harm in past 2 weeks - - No   PHQ-9 Safety concerns? - - No   PHQ-9 Safety concerns? - - No        1st Outreach Date: March 25, 2022 Time: 8:41am  Outcome: Left a message for patient to call [facility].  If patient doesn't return the call the [facility] Pool will make one more phone attempt within 24 hours.    MONICO Maldonado    Sent [employer] message-8:46am    2nd Outreach Date: March 25, 2022 Time: 10:30am  Outcome: Completed phone conversation / triage service.  See assessment and disposition below.  MONICO Maldonado my name is Deysi.  I m calling from [facility] to follow-up on a questionnaire you completed on nScaled.       If it s ok I d like review a few of your symptoms/responses and a talk briefly  about how you have been doing to see if I might be able to provide some support and guidance.       Patient reports he has been having thoughts of \"being better off dead\", occurs about every other day for the last several months. He shares that he feels this is due to medication since he did try to stop the (venaflaxine) medication at one point and the thoughts were not there, however he did have headaches again so he restarted the medication. Patient denies that his thoughts increase to ways of how he would hurt himself.  Acknowledges a general sense of sadness despite things going well. Denies that he wants to die or hurt himself. Contracts for safety.  Patient denies needing a safety plan or crisis numbers, states that he uses his friends and family for support when needed. Encouraged pt to go to emergency room or call 911 if thoughts increased or felt unsafe which he was agreeable to. "     Patient had an appt with Wegener, Joel Daniel Irwin yesterday that was cancelled by the provider. He plans on reaching out to the provider to request an appt to discuss mediation management.     Address/Location of patient: work  Have any supportive people near them? No            Risk Assessment:  Patient has had a history of suicidal ideation: passive thoughts of being better off dead  Patient reports the following current or recent suicidal ideation or behaviors: passive suicidal thoughts of being better off dead.  Patient denies current or recent homicidal ideation or behaviors.  Patient denies current or recent self injurious behavior or ideation.  Patient denies other safety concerns.  Patient reports there are firearms in the house. The firearms are secured in a locked space  Protective Factors Sense of responsibility to family, Life Satisfaction and Positive social support   Risk Factors: Medications causing S.I    ASQ Assessment:  1. In the past few weeks, have you wished you were dead?  No, not wishes, Thoughts of it being better if I wasn't alive.  NO desire to die.  2. In the past few weeks, have you felt that you or your family would be better off if you         were dead?  No  3. In the past week, have you been having thoughts about killing yourself?  No  4. Have you ever tried to kill yourself?  No    Disposition:    - Recommendations / Safety Plan: A safety and risk management plan has not been developed at this time, however patient was encouraged to call [facility] should there be a change in any of these risk factors.

## 2022-03-25 NOTE — TELEPHONE ENCOUNTER
Patient returned call.   He will be scheduled today but JW will likely give him a call after 6pm per patient's work schedule.     Jes EDGAR

## 2022-03-25 NOTE — TELEPHONE ENCOUNTER
Provider Response to 2nd Level Triage Request    I have reviewed the RN documentation. My recommendation is:  Virtual Visit Same Day: place patient on my schedule, as I have availability. and can you see when he may be availalbe starting now through 10pm?  not sure exactly when I will have chance ot call.  Otherwise schedule appt and could do at around noon tomorrow

## 2022-03-25 NOTE — TELEPHONE ENCOUNTER
Called patient and left nondetailed vm to call clinic back [phone].   Will schedule virtual appt w/ JW--double book patient.     Jes EDGAR

## 2022-03-28 ASSESSMENT — ANXIETY QUESTIONNAIRES
GAD7 TOTAL SCORE: 6
2. NOT BEING ABLE TO STOP OR CONTROL WORRYING: SEVERAL DAYS
GAD7 TOTAL SCORE: 6
5. BEING SO RESTLESS THAT IT IS HARD TO SIT STILL: SEVERAL DAYS
7. FEELING AFRAID AS IF SOMETHING AWFUL MIGHT HAPPEN: NOT AT ALL
6. BECOMING EASILY ANNOYED OR IRRITABLE: SEVERAL DAYS
4. TROUBLE RELAXING: SEVERAL DAYS
GAD7 TOTAL SCORE: 6
7. FEELING AFRAID AS IF SOMETHING AWFUL MIGHT HAPPEN: NOT AT ALL
3. WORRYING TOO MUCH ABOUT DIFFERENT THINGS: SEVERAL DAYS
1. FEELING NERVOUS, ANXIOUS, OR ON EDGE: SEVERAL DAYS

## 2022-03-28 ASSESSMENT — PATIENT HEALTH QUESTIONNAIRE - PHQ9
10. IF YOU CHECKED OFF ANY PROBLEMS, HOW DIFFICULT HAVE THESE PROBLEMS MADE IT FOR YOU TO DO YOUR WORK, TAKE CARE OF THINGS AT HOME, OR GET ALONG WITH OTHER PEOPLE: SOMEWHAT DIFFICULT
SUM OF ALL RESPONSES TO PHQ QUESTIONS 1-9: 9
SUM OF ALL RESPONSES TO PHQ QUESTIONS 1-9: 9

## 2022-03-29 ASSESSMENT — PATIENT HEALTH QUESTIONNAIRE - PHQ9: SUM OF ALL RESPONSES TO PHQ QUESTIONS 1-9: 9

## 2022-03-29 ASSESSMENT — ANXIETY QUESTIONNAIRES: GAD7 TOTAL SCORE: 6

## 2022-03-31 ENCOUNTER — VIRTUAL VISIT (OUTPATIENT)
Dept: FAMILY MEDICINE | Facility: CLINIC | Age: 37
End: 2022-03-31
Payer: COMMERCIAL

## 2022-03-31 DIAGNOSIS — F33.1 MODERATE EPISODE OF RECURRENT MAJOR DEPRESSIVE DISORDER (H): ICD-10-CM

## 2022-03-31 DIAGNOSIS — Z13.220 SCREENING FOR HYPERLIPIDEMIA: Primary | ICD-10-CM

## 2022-03-31 DIAGNOSIS — G43.909 MIGRAINE WITHOUT STATUS MIGRAINOSUS, NOT INTRACTABLE, UNSPECIFIED MIGRAINE TYPE: ICD-10-CM

## 2022-03-31 PROCEDURE — 99214 OFFICE O/P EST MOD 30 MIN: CPT | Mod: 95 | Performed by: FAMILY MEDICINE

## 2022-03-31 RX ORDER — METOPROLOL SUCCINATE 25 MG/1
25 TABLET, EXTENDED RELEASE ORAL DAILY
Qty: 30 TABLET | Refills: 1 | Status: SHIPPED | OUTPATIENT
Start: 2022-03-31 | End: 2022-05-13

## 2022-03-31 ASSESSMENT — PATIENT HEALTH QUESTIONNAIRE - PHQ9
SUM OF ALL RESPONSES TO PHQ QUESTIONS 1-9: 9
10. IF YOU CHECKED OFF ANY PROBLEMS, HOW DIFFICULT HAVE THESE PROBLEMS MADE IT FOR YOU TO DO YOUR WORK, TAKE CARE OF THINGS AT HOME, OR GET ALONG WITH OTHER PEOPLE: SOMEWHAT DIFFICULT

## 2022-03-31 ASSESSMENT — ANXIETY QUESTIONNAIRES: GAD7 TOTAL SCORE: 6

## 2022-03-31 NOTE — PROGRESS NOTES
"Viet is a 37 year old who is being evaluated via a billable video visit.      How would you like to obtain your AVS? MyChart  If the video visit is dropped, the invitation should be resent by:   Will anyone else be joining your video visit? No      Video Start Time: 4:32        Assessment & Plan   PHQ 7/6/2021 3/19/2022 3/28/2022   PHQ-9 Total Score 6 8 9   Q9: Thoughts of better off dead/self-harm past 2 weeks Not at all Several days Several days   F/U: Thoughts of suicide or self-harm - No No   F/U: Safety concerns - No No     Sertraline was very good for mood.     Added venlafaxine for mood/depression augmentation and also migraine prophylaxis.  Worked well for migraine but seemed to make mood worse.  Has started/stopped venlafaxine twice and same problem each time.     Plan:  Reviewed other migraine controller options and potential risks/side effects including amitriptyline and metoprolol     Given also being treated for hypertension elected to trial metoprolol xl 25mg daily.     Will  Monitor blood pressue daily and if starting to be consistently in 100s/60-70s we can start to back of lisinopril/hctz dose.     Follow up with myself in about one month     Clarified not feeling suicidal more of an \"existential \" feeling of perhaps would have been better if not born.     4:32-4:51 vide0  Screening for hyperlipidemia      Moderate episode of recurrent major depressive disorder (H)      Migraine without status migrainosus, not intractable, unspecified migraine type    - metoprolol succinate ER (TOPROL-XL) 25 MG 24 hr tablet; Take 1 tablet (25 mg) by mouth daily             BMI:   Estimated body mass index is 30.75 kg/m  as calculated from the following:    Height as of 7/6/21: 1.753 m (5' 9\").    Weight as of 7/6/21: 94.4 kg (208 lb 3.2 oz).           Return in 3 weeks (on 4/21/2022) for wellness/physical, depression/anxiety.    Joel Daniel Wegener, MD  [facility]    Subjective   Viet is a 37 " "year old who presents for the following health issues     History of Present Illness       Mental Health Follow-up:  Patient presents to follow-up on Depression & Anxiety.Patient's depression since last visit has been:  Medium  The patient is not having other symptoms associated with depression.  Patient's anxiety since last visit has been:  Medium  The patient is not having other symptoms associated with anxiety.  Any significant life events: No  Patient is feeling anxious or having panic attacks.  Patient has no concerns about alcohol or drug use.       Today's PHQ-9         PHQ-9 Total Score: 9  PHQ-9 Q9 Thoughts of better off dead/self-harm past 2 weeks :   (P) Several days  Thoughts of suicide or self harm: (P) No  Self-harm Plan:     Self-harm Action:       Safety concerns for self or others: (P) No    How difficult have these problems made it for you to do your work, take care of things at home, or get along with other people: Somewhat difficult    Today's MONIK-7 Score: 6    Migraines:   Since the patient's last clinic visit, headaches are: no change  The patient is getting headaches:  Once a week. Unless I stop taking Venlafaxine, then it's almost every day.  He is not able to do normal daily activities when he has a migraine.  The patient is taking the following rescue/relief medications:  Ibuprofen (Advil, Motrin) and sumatriptan (Imitrex)   Patient states \"I get some relief\" from the rescue/relief medications.   The patient is taking the following medications to prevent migraines:  No medications to prevent migraines  In the past 4 weeks, the patient has gone to an Urgent Care or Emergency Room 0 times times due to headaches.    He eats 4 or more servings of fruits and vegetables daily.He consumes 1 sweetened beverage(s) daily.He exercises with enough effort to increase his heart rate 10 to 19 minutes per day.  He exercises with enough effort to increase his heart rate 3 or less days per week.   He is " taking medications regularly.     The combination of venlafaxine and sertraline seems to make me really sad/down about everything. But when I switch back to just sertraline, I start to feel more normal, but the migraines start coming back after about a week.  Is there any interaction between these 2 meds that would cause this? Is there another medication we can try? I am currently trying just venlafaxine, too see it that helps.    See plan for additional hpi.           Review of Systems         Objective           Vitals:  No vitals were obtained today due to virtual visit.    Physical Exam   GENERAL: Healthy, alert and no distress  EYES: Eyes grossly normal to inspection.  No discharge or erythema, or obvious scleral/conjunctival abnormalities.  RESP: No audible wheeze, cough, or visible cyanosis.  No visible retractions or increased work of breathing.    SKIN: Visible skin clear. No significant rash, abnormal pigmentation or lesions.  NEURO: Cranial nerves grossly intact.  Mentation and speech appropriate for age.  PSYCH: Mentation appears normal, affect normal/bright, judgement and insight intact, normal speech and appearance well-groomed.                Video-Visit Details    Type of service:  Video Visit    Video End Time:4:51    Originating Location (pt. Location): Home    Distant Location (provider location):  [facility]     Platform used for Video Visit: [employer]

## 2022-05-12 DIAGNOSIS — G43.909 MIGRAINE WITHOUT STATUS MIGRAINOSUS, NOT INTRACTABLE, UNSPECIFIED MIGRAINE TYPE: ICD-10-CM

## 2022-05-12 DIAGNOSIS — K21.9 GASTROESOPHAGEAL REFLUX DISEASE WITHOUT ESOPHAGITIS: ICD-10-CM

## 2022-05-12 DIAGNOSIS — G43.109 MIGRAINE WITH AURA AND WITHOUT STATUS MIGRAINOSUS, NOT INTRACTABLE: ICD-10-CM

## 2022-05-12 DIAGNOSIS — I10 HYPERTENSION GOAL BP (BLOOD PRESSURE) < 130/80: ICD-10-CM

## 2022-05-13 RX ORDER — SUMATRIPTAN 50 MG/1
TABLET, FILM COATED ORAL
Qty: 9 TABLET | Refills: 0 | Status: SHIPPED | OUTPATIENT
Start: 2022-05-13 | End: 2022-05-18

## 2022-05-13 RX ORDER — METOPROLOL SUCCINATE 25 MG/1
TABLET, EXTENDED RELEASE ORAL
Qty: 30 TABLET | Refills: 0 | Status: SHIPPED | OUTPATIENT
Start: 2022-05-13 | End: 2022-05-18

## 2022-05-13 RX ORDER — LISINOPRIL AND HYDROCHLOROTHIAZIDE 20; 25 MG/1; MG/1
TABLET ORAL
Qty: 30 TABLET | Refills: 0 | Status: SHIPPED | OUTPATIENT
Start: 2022-05-13 | End: 2022-05-18

## 2022-05-13 NOTE — TELEPHONE ENCOUNTER
Medication is being filled for 1 time refill only due to:  Patient needs to be seen because due for physical. last 3/5/21.     Ayla Lang RN

## 2022-05-14 ENCOUNTER — HEALTH MAINTENANCE LETTER (OUTPATIENT)
Age: 37
End: 2022-05-14

## 2022-05-17 ASSESSMENT — ENCOUNTER SYMPTOMS
FREQUENCY: 0
PARESTHESIAS: 0
HEMATURIA: 0
COUGH: 0
DIARRHEA: 0
EYE PAIN: 0
FEVER: 0
DIZZINESS: 0
HEADACHES: 0
HEARTBURN: 0
SORE THROAT: 0
PALPITATIONS: 0
DYSURIA: 0
JOINT SWELLING: 0
HEMATOCHEZIA: 0
ABDOMINAL PAIN: 0
SHORTNESS OF BREATH: 0
CHILLS: 0
ARTHRALGIAS: 0
NERVOUS/ANXIOUS: 0
MYALGIAS: 0
WEAKNESS: 0
NAUSEA: 0
CONSTIPATION: 0

## 2022-05-17 ASSESSMENT — ANXIETY QUESTIONNAIRES
8. IF YOU CHECKED OFF ANY PROBLEMS, HOW DIFFICULT HAVE THESE MADE IT FOR YOU TO DO YOUR WORK, TAKE CARE OF THINGS AT HOME, OR GET ALONG WITH OTHER PEOPLE?: SOMEWHAT DIFFICULT
7. FEELING AFRAID AS IF SOMETHING AWFUL MIGHT HAPPEN: SEVERAL DAYS
4. TROUBLE RELAXING: SEVERAL DAYS
GAD7 TOTAL SCORE: 7
GAD7 TOTAL SCORE: 7
5. BEING SO RESTLESS THAT IT IS HARD TO SIT STILL: SEVERAL DAYS
2. NOT BEING ABLE TO STOP OR CONTROL WORRYING: SEVERAL DAYS
7. FEELING AFRAID AS IF SOMETHING AWFUL MIGHT HAPPEN: SEVERAL DAYS
3. WORRYING TOO MUCH ABOUT DIFFERENT THINGS: SEVERAL DAYS
6. BECOMING EASILY ANNOYED OR IRRITABLE: SEVERAL DAYS
1. FEELING NERVOUS, ANXIOUS, OR ON EDGE: SEVERAL DAYS
GAD7 TOTAL SCORE: 7

## 2022-05-17 NOTE — PROGRESS NOTES
SUBJECTIVE:   CC: Viet Beebe is an 37 year old male who presents for preventative health visit.     Patient has been advised of split billing requirements and indicates understanding: Yes  Healthy Habits:     Getting at least 3 servings of Calcium per day:  Yes    Bi-annual eye exam:  Yes    Dental care twice a year:  Yes    Sleep apnea or symptoms of sleep apnea:  Daytime drowsiness, Excessive snoring and Sleep apnea    Diet:  Regular (no restrictions)    Frequency of exercise:  1 day/week    Duration of exercise:  15-30 minutes    Taking medications regularly:  Yes    Medication side effects:  Not applicable    PHQ-2 Total Score: 1    Additional concerns today:  No    Answers for HPI/ROS submitted by the patient on 2022  If you checked off any problems, how difficult have these problems made it for you to do your work, take care of things at home, or get along with other people?: Somewhat difficult  PHQ9 TOTAL SCORE: 4  MONIK 7 TOTAL SCORE: 7      Today's PHQ-2 Score:   PHQ-2 (  [employer]) 2022   Q1: Little interest or pleasure in doing things 0   Q2: Feeling down, depressed or hopeless 1   PHQ-2 Score 1   PHQ-2 Total Score (12-17 Years)- Positive if 3 or more points; Administer PHQ-A if positive -   Q1: Little interest or pleasure in doing things Not at all   Q2: Feeling down, depressed or hopeless Several days   PHQ-2 Score 1       Abuse: Current or Past(Physical, Sexual or Emotional)- No  Do you feel safe in your environment? Yes        Social History     Tobacco Use     Smoking status: Former Smoker     Types: Cigarettes     Quit date: 2010     Years since quittin.3     Smokeless tobacco: Never Used   Substance Use Topics     Alcohol use: Yes     Comment: Drinks over rhe w/e. 5 drinks weekly     If you drink alcohol do you typically have >3 drinks per day or >7 drinks per week? No  Last PSA: No results found for: PSA    Reviewed orders with patient. Reviewed health maintenance and updated  "orders accordingly - Yes  Lab work is in process    Reviewed and updated as needed this visit by clinical staff   Tobacco  Allergies  Meds     [facility] Hx            Reviewed and updated as needed this visit by Provider     Meds     [facility]               Review of Systems   Constitutional: Negative for chills and fever.   HENT: Negative for congestion, ear pain, hearing loss and sore throat.    Eyes: Negative for pain and visual disturbance.   Respiratory: Negative for cough and shortness of breath.    Cardiovascular: Negative for chest pain, palpitations and peripheral edema.   Gastrointestinal: Negative for abdominal pain, constipation, diarrhea, heartburn, hematochezia and nausea.   Genitourinary: Negative for dysuria, frequency, genital sores, hematuria, impotence, penile discharge and urgency.   Musculoskeletal: Negative for arthralgias, joint swelling and myalgias.   Skin: Negative for rash.   Neurological: Negative for dizziness, weakness, headaches and paresthesias.   Psychiatric/Behavioral: Negative for mood changes. The patient is not nervous/anxious.        OBJECTIVE:   /86 (BP Location: Left arm, Patient Position: Sitting)   Pulse 76   Temp 98.1  F (36.7  C) (Oral)   Ht 1.72 m (5' 7.72\")   Wt 100.3 kg (221 lb 2 oz)   SpO2 98%   BMI 33.90 kg/m      Physical Exam  GENERAL: healthy, alert and no distress  EYES: Eyes grossly normal to inspection, PERRL and conjunctivae and sclerae normal  HENT: ear canals and TM's normal, nose and mouth without ulcers or lesions  NECK: no adenopathy, no asymmetry, masses, or scars and thyroid normal to palpation  RESP: lungs clear to auscultation - no rales, rhonchi or wheezes  CV: regular rate and rhythm, normal S1 S2, no S3 or S4, no murmur, click or rub, no peripheral edema and peripheral pulses strong  ABDOMEN: soft, nontender, no hepatosplenomegaly, no masses and bowel sounds normal  MS: no gross musculoskeletal defects noted, no edema  SKIN: no suspicious " lesions or rashes  NEURO: Normal strength and tone, mentation intact and speech normal  PSYCH: mentation appears normal, affect normal/bright    Diagnostic Test Results:  Labs reviewed in Epic  Results for orders placed or performed in visit on 05/18/22 (from the past 24 hour(s))   CBC with platelets   Result Value Ref Range    WBC Count 7.9 4.0 - 11.0 10e3/uL    RBC Count 5.58 4.40 - 5.90 10e6/uL    Hemoglobin 16.0 13.3 - 17.7 g/dL    Hematocrit 47.1 40.0 - 53.0 %    MCV 84 78 - 100 fL    MCH 28.7 26.5 - 33.0 pg    MCHC 34.0 31.5 - 36.5 g/dL    RDW 13.0 10.0 - 15.0 %    Platelet Count 263 150 - 450 10e3/uL       ASSESSMENT/PLAN:       ICD-10-CM    1. Routine general medical examination at a health care facility  Z00.00 Albumin Random Urine Quantitative with Creat Ratio     cholecalciferol (VITAMIN D3) 25 mcg (1000 units) capsule     Comprehensive metabolic panel (BMP + Alb, Alk Phos, ALT, AST, Total. Bili, TP)     CBC with platelets     Albumin Random Urine Quantitative with Creat Ratio     Comprehensive metabolic panel (BMP + Alb, Alk Phos, ALT, AST, Total. Bili, TP)     CBC with platelets   2. Screening for hyperlipidemia  Z13.220 Lipid panel reflex to direct LDL Non-fasting     Lipid panel reflex to direct LDL Non-fasting   3. Migraine with aura and without status migrainosus, not intractable  G43.109 SUMAtriptan (IMITREX) 50 MG tablet   4. Anxiety and depression  F41.9 sertraline (ZOLOFT) 100 MG tablet    F32.A    5. Gastroesophageal reflux disease without esophagitis  K21.9 omeprazole (PRILOSEC) 20 MG DR capsule   6. Migraine without status migrainosus, not intractable, unspecified migraine type  G43.909 metoprolol succinate ER (TOPROL XL) 25 MG 24 hr tablet   7. Hypertension goal BP (blood pressure) < 130/80  I10 Albumin Random Urine Quantitative with Creat Ratio     lisinopril-hydrochlorothiazide (ZESTORETIC) 20-25 MG tablet     Albumin Random Urine Quantitative with Creat Ratio       Patient has been  "advised of split billing requirements and indicates understanding: Yes    COUNSELING:   Reviewed preventive health counseling, as reflected in patient instructions       Regular exercise       Healthy diet/nutrition       Vision screening       Hearing screening    Estimated body mass index is 33.9 kg/m  as calculated from the following:    Height as of this encounter: 1.72 m (5' 7.72\").    Weight as of this encounter: 100.3 kg (221 lb 2 oz).         He reports that he quit smoking about 12 years ago. His smoking use included cigarettes. He has never used smokeless tobacco.      Counseling Resources:  ATP IV Guidelines  Pooled Cohorts Equation Calculator  FRAX Risk Assessment  ICSI Preventive Guidelines  Dietary Guidelines for Americans, 2010  USDA's MyPlate  ASA Prophylaxis  Lung CA Screening    Reba Kerns MD  [facility]  "

## 2022-05-18 ENCOUNTER — OFFICE VISIT (OUTPATIENT)
Dept: FAMILY MEDICINE | Facility: CLINIC | Age: 37
End: 2022-05-18
Payer: COMMERCIAL

## 2022-05-18 VITALS
HEART RATE: 76 BPM | DIASTOLIC BLOOD PRESSURE: 86 MMHG | BODY MASS INDEX: 33.51 KG/M2 | TEMPERATURE: 98.1 F | SYSTOLIC BLOOD PRESSURE: 130 MMHG | HEIGHT: 68 IN | OXYGEN SATURATION: 98 % | WEIGHT: 221.13 LBS

## 2022-05-18 DIAGNOSIS — R74.02 NONSPECIFIC ELEVATION OF LEVELS OF TRANSAMINASE AND LACTIC ACID DEHYDROGENASE (LDH): ICD-10-CM

## 2022-05-18 DIAGNOSIS — G43.909 MIGRAINE WITHOUT STATUS MIGRAINOSUS, NOT INTRACTABLE, UNSPECIFIED MIGRAINE TYPE: ICD-10-CM

## 2022-05-18 DIAGNOSIS — Z13.220 SCREENING FOR HYPERLIPIDEMIA: ICD-10-CM

## 2022-05-18 DIAGNOSIS — E78.5 HYPERLIPIDEMIA LDL GOAL <130: ICD-10-CM

## 2022-05-18 DIAGNOSIS — R74.01 NONSPECIFIC ELEVATION OF LEVELS OF TRANSAMINASE AND LACTIC ACID DEHYDROGENASE (LDH): ICD-10-CM

## 2022-05-18 DIAGNOSIS — Z00.00 ROUTINE GENERAL MEDICAL EXAMINATION AT A HEALTH CARE FACILITY: Primary | ICD-10-CM

## 2022-05-18 DIAGNOSIS — K21.9 GASTROESOPHAGEAL REFLUX DISEASE WITHOUT ESOPHAGITIS: ICD-10-CM

## 2022-05-18 DIAGNOSIS — I10 HYPERTENSION GOAL BP (BLOOD PRESSURE) < 130/80: ICD-10-CM

## 2022-05-18 DIAGNOSIS — G43.109 MIGRAINE WITH AURA AND WITHOUT STATUS MIGRAINOSUS, NOT INTRACTABLE: ICD-10-CM

## 2022-05-18 DIAGNOSIS — F41.9 ANXIETY AND DEPRESSION: ICD-10-CM

## 2022-05-18 DIAGNOSIS — F32.A ANXIETY AND DEPRESSION: ICD-10-CM

## 2022-05-18 LAB
ERYTHROCYTE [DISTWIDTH] IN BLOOD BY AUTOMATED COUNT: 13 % (ref 10–15)
HCT VFR BLD AUTO: 47.1 % (ref 40–53)
HGB BLD-MCNC: 16 G/DL (ref 13.3–17.7)
MCH RBC QN AUTO: 28.7 PG (ref 26.5–33)
MCHC RBC AUTO-ENTMCNC: 34 G/DL (ref 31.5–36.5)
MCV RBC AUTO: 84 FL (ref 78–100)
PLATELET # BLD AUTO: 263 10E3/UL (ref 150–450)
RBC # BLD AUTO: 5.58 10E6/UL (ref 4.4–5.9)
WBC # BLD AUTO: 7.9 10E3/UL (ref 4–11)

## 2022-05-18 PROCEDURE — 99395 PREV VISIT EST AGE 18-39: CPT | Mod: 25 | Performed by: FAMILY MEDICINE

## 2022-05-18 PROCEDURE — 85027 COMPLETE CBC AUTOMATED: CPT | Performed by: FAMILY MEDICINE

## 2022-05-18 PROCEDURE — 90471 IMMUNIZATION ADMIN: CPT | Performed by: FAMILY MEDICINE

## 2022-05-18 PROCEDURE — 36415 COLL VENOUS BLD VENIPUNCTURE: CPT | Performed by: FAMILY MEDICINE

## 2022-05-18 PROCEDURE — 90715 TDAP VACCINE 7 YRS/> IM: CPT | Performed by: FAMILY MEDICINE

## 2022-05-18 PROCEDURE — 80053 COMPREHEN METABOLIC PANEL: CPT | Performed by: FAMILY MEDICINE

## 2022-05-18 PROCEDURE — 80061 LIPID PANEL: CPT | Performed by: FAMILY MEDICINE

## 2022-05-18 PROCEDURE — 82043 UR ALBUMIN QUANTITATIVE: CPT | Performed by: FAMILY MEDICINE

## 2022-05-18 PROCEDURE — 99213 OFFICE O/P EST LOW 20 MIN: CPT | Mod: 25 | Performed by: FAMILY MEDICINE

## 2022-05-18 RX ORDER — LISINOPRIL AND HYDROCHLOROTHIAZIDE 20; 25 MG/1; MG/1
1 TABLET ORAL DAILY
Qty: 90 TABLET | Refills: 3 | Status: SHIPPED | OUTPATIENT
Start: 2022-05-18 | End: 2023-04-24

## 2022-05-18 RX ORDER — SERTRALINE HYDROCHLORIDE 100 MG/1
100 TABLET, FILM COATED ORAL DAILY
Qty: 90 TABLET | Refills: 3 | Status: SHIPPED | OUTPATIENT
Start: 2022-05-18 | End: 2023-04-24

## 2022-05-18 RX ORDER — SUMATRIPTAN 50 MG/1
TABLET, FILM COATED ORAL
Qty: 9 TABLET | Refills: 3 | Status: SHIPPED | OUTPATIENT
Start: 2022-05-18 | End: 2022-11-16

## 2022-05-18 RX ORDER — METOPROLOL SUCCINATE 25 MG/1
25 TABLET, EXTENDED RELEASE ORAL DAILY
Qty: 90 TABLET | Refills: 3 | Status: SHIPPED | OUTPATIENT
Start: 2022-05-18 | End: 2023-04-24

## 2022-05-18 ASSESSMENT — ENCOUNTER SYMPTOMS
HEMATOCHEZIA: 0
ABDOMINAL PAIN: 0
SORE THROAT: 0
COUGH: 0
DYSURIA: 0
CONSTIPATION: 0
ARTHRALGIAS: 0
HEARTBURN: 0
MYALGIAS: 0
NAUSEA: 0
FREQUENCY: 0
CHILLS: 0
FEVER: 0
DIZZINESS: 0
PALPITATIONS: 0
EYE PAIN: 0
HEMATURIA: 0
SHORTNESS OF BREATH: 0
DIARRHEA: 0
PARESTHESIAS: 0
HEADACHES: 0
JOINT SWELLING: 0
NERVOUS/ANXIOUS: 0
WEAKNESS: 0

## 2022-05-18 ASSESSMENT — PATIENT HEALTH QUESTIONNAIRE - PHQ9
SUM OF ALL RESPONSES TO PHQ QUESTIONS 1-9: 4
SUM OF ALL RESPONSES TO PHQ QUESTIONS 1-9: 4
10. IF YOU CHECKED OFF ANY PROBLEMS, HOW DIFFICULT HAVE THESE PROBLEMS MADE IT FOR YOU TO DO YOUR WORK, TAKE CARE OF THINGS AT HOME, OR GET ALONG WITH OTHER PEOPLE: SOMEWHAT DIFFICULT

## 2022-05-18 ASSESSMENT — ANXIETY QUESTIONNAIRES: GAD7 TOTAL SCORE: 7

## 2022-05-18 NOTE — NURSING NOTE
Prior to immunization administration, verified patients identity using patient s name and date of birth. Please see Immunization Activity for additional information.     Screening Questionnaire for Adult Immunization    Are you sick today?   No   Do you have allergies to medications, food, a vaccine component or latex?   No   Have you ever had a serious reaction after receiving a vaccination?   No   Do you have a long-term health problem with heart, lung, kidney, or metabolic disease (e.g., diabetes), asthma, a blood disorder, no spleen, complement component deficiency, a cochlear implant, or a spinal fluid leak?  Are you on long-term aspirin therapy?   No   Do you have cancer, leukemia, HIV/AIDS, or any other immune system problem?   No   Do you have a parent, brother, or sister with an immune system problem?   No   In the past 3 months, have you taken medications that affect  your immune system, such as prednisone, other steroids, or anticancer drugs; drugs for the treatment of rheumatoid arthritis, Crohn s disease, or psoriasis; or have you had radiation treatments?   No   Have you had a seizure, or a brain or other nervous system problem?   No   During the past year, have you received a transfusion of blood or blood    products, or been given immune (gamma) globulin or antiviral drug?   No   For women: Are you pregnant or is there a chance you could become       pregnant during the next month?   No   Have you received any vaccinations in the past 4 weeks?   No     Immunization questionnaire answers were all negative.        Per orders of Dr. Kerns, injection of TDAP given by Zenaida Villaseñor RN. Patient instructed to remain in clinic for 15 minutes afterwards, and to report any adverse reaction to me immediately.       Screening performed by Zenaida Villaseñor RN on 5/18/2022 at 2:41 PM.

## 2022-05-19 LAB
ALBUMIN SERPL-MCNC: 4.8 G/DL (ref 3.4–5)
ALP SERPL-CCNC: 76 U/L (ref 40–150)
ALT SERPL W P-5'-P-CCNC: 84 U/L (ref 0–70)
ANION GAP SERPL CALCULATED.3IONS-SCNC: 8 MMOL/L (ref 3–14)
AST SERPL W P-5'-P-CCNC: 42 U/L (ref 0–45)
BILIRUB SERPL-MCNC: 0.5 MG/DL (ref 0.2–1.3)
BUN SERPL-MCNC: 12 MG/DL (ref 7–30)
CALCIUM SERPL-MCNC: 9.9 MG/DL (ref 8.5–10.1)
CHLORIDE BLD-SCNC: 102 MMOL/L (ref 94–109)
CHOLEST SERPL-MCNC: 264 MG/DL
CO2 SERPL-SCNC: 26 MMOL/L (ref 20–32)
CREAT SERPL-MCNC: 0.87 MG/DL (ref 0.66–1.25)
FASTING STATUS PATIENT QL REPORTED: NO
GFR SERPL CREATININE-BSD FRML MDRD: >90 ML/MIN/1.73M2
GLUCOSE BLD-MCNC: 86 MG/DL (ref 70–99)
HDLC SERPL-MCNC: 46 MG/DL
LDLC SERPL CALC-MCNC: 171 MG/DL
NONHDLC SERPL-MCNC: 218 MG/DL
POTASSIUM BLD-SCNC: 3.8 MMOL/L (ref 3.4–5.3)
PROT SERPL-MCNC: 8.2 G/DL (ref 6.8–8.8)
SODIUM SERPL-SCNC: 136 MMOL/L (ref 133–144)
TRIGL SERPL-MCNC: 233 MG/DL

## 2022-05-20 LAB
CREAT UR-MCNC: 43 MG/DL
MICROALBUMIN UR-MCNC: 5 MG/L
MICROALBUMIN/CREAT UR: 11.63 MG/G CR (ref 0–17)

## 2022-05-23 NOTE — RESULT ENCOUNTER NOTE
Dear Viet,   Your labs indicates a severely elevated total cholesterol, triglycerides and LDL.  Discomfort radiation can increase your risk of cardiovascular disease in the future.  I would recommend dietary restrictions and weight loss.  Prior t starting you on a cholesterol-lowering medication, I would recommend repeating your labs in 3 months.  Please schedule a lab only visit via [employer].    Also, there is a mild elevation in one of your liver enzymes.  I would like you to Avoid alcohol and acetaminophen/Tylenol complete.  Schedule a lab only visit in 2 to 3 weeks for a recheck.        Best Regards   Reba Kerns MD

## 2022-06-16 ENCOUNTER — LAB (OUTPATIENT)
Dept: LAB | Facility: CLINIC | Age: 37
End: 2022-06-16
Payer: COMMERCIAL

## 2022-06-16 DIAGNOSIS — R74.02 NONSPECIFIC ELEVATION OF LEVELS OF TRANSAMINASE AND LACTIC ACID DEHYDROGENASE (LDH): ICD-10-CM

## 2022-06-16 DIAGNOSIS — R74.01 NONSPECIFIC ELEVATION OF LEVELS OF TRANSAMINASE AND LACTIC ACID DEHYDROGENASE (LDH): ICD-10-CM

## 2022-06-16 PROCEDURE — 80076 HEPATIC FUNCTION PANEL: CPT

## 2022-06-16 PROCEDURE — 36415 COLL VENOUS BLD VENIPUNCTURE: CPT

## 2022-06-17 LAB
ALBUMIN SERPL-MCNC: 4.6 G/DL (ref 3.4–5)
ALP SERPL-CCNC: 82 U/L (ref 40–150)
ALT SERPL W P-5'-P-CCNC: 58 U/L (ref 0–70)
AST SERPL W P-5'-P-CCNC: 32 U/L (ref 0–45)
BILIRUB DIRECT SERPL-MCNC: <0.1 MG/DL (ref 0–0.2)
BILIRUB SERPL-MCNC: 0.3 MG/DL (ref 0.2–1.3)
PROT SERPL-MCNC: 8 G/DL (ref 6.8–8.8)

## 2022-09-03 ENCOUNTER — HEALTH MAINTENANCE LETTER (OUTPATIENT)
Age: 37
End: 2022-09-03

## 2022-10-24 ENCOUNTER — OFFICE VISIT (OUTPATIENT)
Dept: FAMILY MEDICINE | Facility: CLINIC | Age: 37
End: 2022-10-24
Payer: COMMERCIAL

## 2022-10-24 VITALS
TEMPERATURE: 97.8 F | RESPIRATION RATE: 17 BRPM | DIASTOLIC BLOOD PRESSURE: 78 MMHG | HEART RATE: 94 BPM | SYSTOLIC BLOOD PRESSURE: 122 MMHG | WEIGHT: 213 LBS | BODY MASS INDEX: 32.66 KG/M2

## 2022-10-24 DIAGNOSIS — E78.5 HYPERLIPIDEMIA LDL GOAL <130: ICD-10-CM

## 2022-10-24 DIAGNOSIS — L91.8 SKIN TAG: Primary | ICD-10-CM

## 2022-10-24 DIAGNOSIS — I10 HYPERTENSION GOAL BP (BLOOD PRESSURE) < 130/80: ICD-10-CM

## 2022-10-24 LAB
ANION GAP SERPL CALCULATED.3IONS-SCNC: 13 MMOL/L (ref 7–15)
BUN SERPL-MCNC: 13.6 MG/DL (ref 6–20)
CALCIUM SERPL-MCNC: 9.9 MG/DL (ref 8.6–10)
CHLORIDE SERPL-SCNC: 99 MMOL/L (ref 98–107)
CHOLEST SERPL-MCNC: 202 MG/DL
CREAT SERPL-MCNC: 0.89 MG/DL (ref 0.67–1.17)
DEPRECATED HCO3 PLAS-SCNC: 25 MMOL/L (ref 22–29)
GFR SERPL CREATININE-BSD FRML MDRD: >90 ML/MIN/1.73M2
GLUCOSE SERPL-MCNC: 98 MG/DL (ref 70–99)
HDLC SERPL-MCNC: 43 MG/DL
LDLC SERPL CALC-MCNC: 119 MG/DL
NONHDLC SERPL-MCNC: 159 MG/DL
POTASSIUM SERPL-SCNC: 3.8 MMOL/L (ref 3.4–5.3)
SODIUM SERPL-SCNC: 137 MMOL/L (ref 136–145)
TRIGL SERPL-MCNC: 202 MG/DL

## 2022-10-24 PROCEDURE — 99213 OFFICE O/P EST LOW 20 MIN: CPT | Mod: 25 | Performed by: FAMILY MEDICINE

## 2022-10-24 PROCEDURE — 36415 COLL VENOUS BLD VENIPUNCTURE: CPT | Performed by: FAMILY MEDICINE

## 2022-10-24 PROCEDURE — 80048 BASIC METABOLIC PNL TOTAL CA: CPT | Performed by: FAMILY MEDICINE

## 2022-10-24 PROCEDURE — 90471 IMMUNIZATION ADMIN: CPT | Performed by: FAMILY MEDICINE

## 2022-10-24 PROCEDURE — 91312 COVID-19,PF,PFIZER BOOSTER BIVALENT: CPT | Performed by: FAMILY MEDICINE

## 2022-10-24 PROCEDURE — 90686 IIV4 VACC NO PRSV 0.5 ML IM: CPT | Performed by: FAMILY MEDICINE

## 2022-10-24 PROCEDURE — 80061 LIPID PANEL: CPT | Performed by: FAMILY MEDICINE

## 2022-10-24 PROCEDURE — 0124A COVID-19,PF,PFIZER BOOSTER BIVALENT: CPT | Performed by: FAMILY MEDICINE

## 2022-10-24 ASSESSMENT — PATIENT HEALTH QUESTIONNAIRE - PHQ9
SUM OF ALL RESPONSES TO PHQ QUESTIONS 1-9: 3
SUM OF ALL RESPONSES TO PHQ QUESTIONS 1-9: 3
10. IF YOU CHECKED OFF ANY PROBLEMS, HOW DIFFICULT HAVE THESE PROBLEMS MADE IT FOR YOU TO DO YOUR WORK, TAKE CARE OF THINGS AT HOME, OR GET ALONG WITH OTHER PEOPLE: SOMEWHAT DIFFICULT

## 2022-10-24 NOTE — PROGRESS NOTES
Assessment & Plan     Skin tag  2 small, less than a couple of mm at the left axillary and 1 at the right axillary, 1 in the mid chest area; benign nature of disease discussed, consider excision at the PCP office if enlarging.    Hypertension goal BP (blood pressure) < 130/80    - BASIC METABOLIC PANEL; Future  - BASIC METABOLIC PANEL    Hyperlipidemia LDL goal <130    - Lipid panel reflex to direct LDL Fasting    Review of external notes as documented elsewhere in note  20 minutes spent on the date of the encounter doing chart review, review of outside records, review of test results, interpretation of tests, patient visit and documentation        Work on weight loss  Regular exercise    No follow-ups on file.    Oren Dyer MD  [facility] ABAD Llanos is a 37 year old, presenting for the following health issues:  Skin Tags      History of Present Illness       Reason for visit:  Skin tags/spots under arms, on chest, and neck. History of skin cancer, mother. Also COVID Booster shot, and Lipid Panel to Direct LDL Panel.  Symptom onset:  3-4 weeks ago  Symptoms include:  Ski tags/bumps/spots  Symptom intensity:  Mild  Symptom progression:  Worsening  Had these symptoms before:  Yes  Has tried/received treatment for these symptoms:  No  What makes it worse:  NA  What makes it better:  NA    He eats 4 or more servings of fruits and vegetables daily.He consumes 0 sweetened beverage(s) daily.He exercises with enough effort to increase his heart rate 10 to 19 minutes per day.  He exercises with enough effort to increase his heart rate 4 days per week.   He is taking medications regularly.    Today's PHQ-9         PHQ-9 Total Score: 3    PHQ-9 Q9 Thoughts of better off dead/self-harm past 2 weeks :   Not at all    How difficult have these problems made it for you to do your work, take care of things at home, or get along with other people: Somewhat difficult       Skin tag both  axillary area, upper chest, over the years, seems to getting bigger lately, but no bleeding or pain.  Mom was also recently diagnosed with skin cancer ,patient not sure what type.  Hypertension appears stable on metoprolol and Zestoretic.  Patient is due for lipid panel in basic metabolic profile check.      Review of Systems   Constitutional, HEENT, cardiovascular, pulmonary, gi and gu systems are negative, except as otherwise noted.      Objective    /78 (BP Location: Right arm, Patient Position: Sitting, Cuff Size: Adult Regular)   Pulse 94   Temp 97.8  F (36.6  C) (Temporal)   Resp 17   Wt 96.6 kg (213 lb)   BMI 32.66 kg/m    Body mass index is 32.66 kg/m .  Physical Exam   GENERAL: healthy, alert and no distress  NECK: no adenopathy, no asymmetry, masses, or scars and thyroid normal to palpation  RESP: lungs clear to auscultation - no rales, rhonchi or wheezes  CV: regular rate and rhythm, normal S1 S2, no S3 or S4, no murmur, click or rub, no peripheral edema and peripheral pulses strong  ABDOMEN: soft, nontender, no hepatosplenomegaly, no masses and bowel sounds normal  MS: no gross musculoskeletal defects noted, no edema  SKIN: 2 skin tags left axillary 1 skin tag right axillary 1 skin tag mid chest, all of them less than 2 to 3 mm, Remainder of thoracic skin exam unremarkable.

## 2022-11-14 ENCOUNTER — MYC MEDICAL ADVICE (OUTPATIENT)
Dept: FAMILY MEDICINE | Facility: CLINIC | Age: 37
End: 2022-11-14

## 2022-11-14 DIAGNOSIS — G43.109 MIGRAINE WITH AURA AND WITHOUT STATUS MIGRAINOSUS, NOT INTRACTABLE: ICD-10-CM

## 2022-11-15 NOTE — TELEPHONE ENCOUNTER
"Routing refill request to provider for review/approval because:  Needs review    Last Written Prescription Date:  5/18/22  Last Fill Quantity: 9,  # refills: 3   Last office visit provider:  10/24/22     Requested Prescriptions   Pending Prescriptions Disp Refills     SUMAtriptan (IMITREX) 50 MG tablet 9 tablet 3     Sig: TAKE 1 TABLET AT ONSET OF HEADACHE FOR MIGRAINE MAY REPEAT IN 2 HOURS. MAXIMUM 4 TABLETS IN 24 HOURS       Serotonin Agonists Failed - 11/15/2022  4:15 PM        Failed - Serotonin Agonist request needs review.     Please review patient's record. If patient has had 8 or more treatments in the past month, please forward to provider.          Passed - Blood pressure under 140/90 in past 12 months     BP Readings from Last 3 Encounters:   10/24/22 122/78   05/18/22 130/86   07/06/21 122/82                 Passed - Recent (12 mo) or future (30 days) visit within the authorizing provider's specialty     Patient has had an office visit with the authorizing provider or a provider within the authorizing providers department within the previous 12 mos or has a future within next 30 days. See \"Patient Info\" tab in inbasket, or \"Choose Columns\" in Meds & Orders section of the refill encounter.              Passed - Medication is active on med list        Passed - Patient is age 18 or older             Kadi Friend RN 11/15/22 5:01 PM  "

## 2022-11-16 RX ORDER — SUMATRIPTAN 50 MG/1
TABLET, FILM COATED ORAL
Qty: 9 TABLET | Refills: 3 | Status: SHIPPED | OUTPATIENT
Start: 2022-11-16 | End: 2023-04-24

## 2023-04-11 ENCOUNTER — OFFICE VISIT (OUTPATIENT)
Dept: FAMILY MEDICINE | Facility: CLINIC | Age: 38
End: 2023-04-11
Payer: COMMERCIAL

## 2023-04-11 VITALS
BODY MASS INDEX: 31.89 KG/M2 | SYSTOLIC BLOOD PRESSURE: 115 MMHG | HEIGHT: 68 IN | RESPIRATION RATE: 20 BRPM | WEIGHT: 210.4 LBS | HEART RATE: 75 BPM | DIASTOLIC BLOOD PRESSURE: 79 MMHG | OXYGEN SATURATION: 97 % | TEMPERATURE: 97.9 F

## 2023-04-11 DIAGNOSIS — R73.03 PREDIABETES: Primary | ICD-10-CM

## 2023-04-11 DIAGNOSIS — F33.1 MODERATE EPISODE OF RECURRENT MAJOR DEPRESSIVE DISORDER (H): ICD-10-CM

## 2023-04-11 DIAGNOSIS — I10 HYPERTENSION GOAL BP (BLOOD PRESSURE) < 130/80: ICD-10-CM

## 2023-04-11 LAB
ALBUMIN SERPL BCG-MCNC: 4.9 G/DL (ref 3.5–5.2)
ALP SERPL-CCNC: 91 U/L (ref 40–129)
ALT SERPL W P-5'-P-CCNC: 28 U/L (ref 10–50)
ANION GAP SERPL CALCULATED.3IONS-SCNC: 14 MMOL/L (ref 7–15)
AST SERPL W P-5'-P-CCNC: 23 U/L (ref 10–50)
BILIRUB SERPL-MCNC: 0.3 MG/DL
BUN SERPL-MCNC: 16.3 MG/DL (ref 6–20)
CALCIUM SERPL-MCNC: 9.9 MG/DL (ref 8.6–10)
CHLORIDE SERPL-SCNC: 95 MMOL/L (ref 98–107)
CREAT SERPL-MCNC: 0.83 MG/DL (ref 0.67–1.17)
DEPRECATED HCO3 PLAS-SCNC: 26 MMOL/L (ref 22–29)
GFR SERPL CREATININE-BSD FRML MDRD: >90 ML/MIN/1.73M2
GLUCOSE SERPL-MCNC: 102 MG/DL (ref 70–99)
HBA1C MFR BLD: 5.9 % (ref 0–5.6)
POTASSIUM SERPL-SCNC: 3.9 MMOL/L (ref 3.4–5.3)
PROT SERPL-MCNC: 7.9 G/DL (ref 6.4–8.3)
SODIUM SERPL-SCNC: 135 MMOL/L (ref 136–145)
TSH SERPL DL<=0.005 MIU/L-ACNC: 2.04 UIU/ML (ref 0.3–4.2)

## 2023-04-11 PROCEDURE — 80053 COMPREHEN METABOLIC PANEL: CPT | Performed by: FAMILY MEDICINE

## 2023-04-11 PROCEDURE — 36415 COLL VENOUS BLD VENIPUNCTURE: CPT | Performed by: FAMILY MEDICINE

## 2023-04-11 PROCEDURE — 84443 ASSAY THYROID STIM HORMONE: CPT | Performed by: FAMILY MEDICINE

## 2023-04-11 PROCEDURE — 83036 HEMOGLOBIN GLYCOSYLATED A1C: CPT | Performed by: FAMILY MEDICINE

## 2023-04-11 PROCEDURE — 99214 OFFICE O/P EST MOD 30 MIN: CPT | Performed by: FAMILY MEDICINE

## 2023-04-11 ASSESSMENT — ANXIETY QUESTIONNAIRES
2. NOT BEING ABLE TO STOP OR CONTROL WORRYING: SEVERAL DAYS
GAD7 TOTAL SCORE: 6
7. FEELING AFRAID AS IF SOMETHING AWFUL MIGHT HAPPEN: NOT AT ALL
GAD7 TOTAL SCORE: 6
3. WORRYING TOO MUCH ABOUT DIFFERENT THINGS: SEVERAL DAYS
4. TROUBLE RELAXING: SEVERAL DAYS
8. IF YOU CHECKED OFF ANY PROBLEMS, HOW DIFFICULT HAVE THESE MADE IT FOR YOU TO DO YOUR WORK, TAKE CARE OF THINGS AT HOME, OR GET ALONG WITH OTHER PEOPLE?: SOMEWHAT DIFFICULT
7. FEELING AFRAID AS IF SOMETHING AWFUL MIGHT HAPPEN: NOT AT ALL
GAD7 TOTAL SCORE: 6
5. BEING SO RESTLESS THAT IT IS HARD TO SIT STILL: SEVERAL DAYS
IF YOU CHECKED OFF ANY PROBLEMS ON THIS QUESTIONNAIRE, HOW DIFFICULT HAVE THESE PROBLEMS MADE IT FOR YOU TO DO YOUR WORK, TAKE CARE OF THINGS AT HOME, OR GET ALONG WITH OTHER PEOPLE: SOMEWHAT DIFFICULT
1. FEELING NERVOUS, ANXIOUS, OR ON EDGE: SEVERAL DAYS
6. BECOMING EASILY ANNOYED OR IRRITABLE: SEVERAL DAYS

## 2023-04-11 ASSESSMENT — PATIENT HEALTH QUESTIONNAIRE - PHQ9
10. IF YOU CHECKED OFF ANY PROBLEMS, HOW DIFFICULT HAVE THESE PROBLEMS MADE IT FOR YOU TO DO YOUR WORK, TAKE CARE OF THINGS AT HOME, OR GET ALONG WITH OTHER PEOPLE: SOMEWHAT DIFFICULT
SUM OF ALL RESPONSES TO PHQ QUESTIONS 1-9: 6
SUM OF ALL RESPONSES TO PHQ QUESTIONS 1-9: 6

## 2023-04-11 ASSESSMENT — PAIN SCALES - GENERAL: PAINLEVEL: MILD PAIN (2)

## 2023-04-11 NOTE — PROGRESS NOTES
"  Assessment & Plan     Prediabetes  A1c at 5.9  Advised patient with diet, exercise, increase physical activity and weight loss.  Follow-up with his primary care physician in 6 months to repeat an A1c.  Hypertension goal BP (blood pressure) < 130/80  Stable, continue with current medication  - Hemoglobin A1c; Future  - Comprehensive metabolic panel (BMP + Alb, Alk Phos, ALT, AST, Total. Bili, TP); Future  - Comprehensive metabolic panel (BMP + Alb, Alk Phos, ALT, AST, Total. Bili, TP)  - Hemoglobin A1c    Moderate episode of recurrent major depressive disorder (H)    - TSH with free T4 reflex; Future  - TSH with free T4 reflex       BMI:   Estimated body mass index is 32.26 kg/m  as calculated from the following:    Height as of this encounter: 1.72 m (5' 7.72\").    Weight as of this encounter: 95.4 kg (210 lb 6.4 oz).   Weight management plan: Discussed healthy diet and exercise guidelines    Work on weight loss  Regular exercise    Suzette Justice MD  [facility]    Rasheed Llanos is a 38 year old, presenting for the following health issues:  would like to be tested for diabetes  Patient reports he was seen by his dentist, he had one spot on his right upper gum area, unhealed for over a month.  His dentist thought he may have diabetes.  Patient has no family or history of diabetes.  He does report weight loss for the past 6 months, feels more thirsty.  He denies being more tired or hungry.  He has no nausea no vomiting, no diarrhea,    History of hypertension, depression-been well controlled he does follow-up with a therapist.        4/11/2023     7:16 AM   Additional Questions   Roomed by Jemma   Accompanied by none         4/11/2023     7:16 AM   Patient Reported Additional Medications   Patient reports taking the following new medications prednisone 5mg from dentist     History of Present Illness       Reason for visit:  Test for Type 2 Diabetes  Symptom onset:  More than a " "month  Symptoms include:  Dentist recommended I get test, due to very slow gum healing. Also, I have been very Thirsty, urinating a lot, fatigue and muscle pain, headaches, tingling in my fingers/toes, and slow healing soars.  Symptom intensity:  Moderate  Symptom progression:  Staying the same  Had these symptoms before:  No    He eats 4 or more servings of fruits and vegetables daily.He consumes 2 sweetened beverage(s) daily.He exercises with enough effort to increase his heart rate 20 to 29 minutes per day.  He exercises with enough effort to increase his heart rate 5 days per week.   He is taking medications regularly.    Today's PHQ-9         PHQ-9 Total Score: 6    PHQ-9 Q9 Thoughts of better off dead/self-harm past 2 weeks :   Not at all    How difficult have these problems made it for you to do your work, take care of things at home, or get along with other people: Somewhat difficult  Today's MONIK-7 Score: 6       Review of Systems   Constitutional, HEENT, cardiovascular, pulmonary, GI, , musculoskeletal, neuro, skin, endocrine and psych systems are negative, except as otherwise noted.      Objective    /79 (BP Location: Right arm, Patient Position: Sitting, Cuff Size: Adult Large)   Pulse 75   Temp 97.9  F (36.6  C) (Tympanic)   Resp 20   Ht 1.72 m (5' 7.72\")   Wt 95.4 kg (210 lb 6.4 oz)   SpO2 97%   BMI 32.26 kg/m    Body mass index is 32.26 kg/m .  Physical Exam   GENERAL: healthy, alert and no distress  NECK: no adenopathy, no asymmetry, masses, or scars and thyroid normal to palpation  RESP: lungs clear to auscultation - no rales, rhonchi or wheezes  CV: regular rate and rhythm, normal S1 S2, no S3 or S4, no murmur, click or rub, no peripheral edema and peripheral pulses strong  ABDOMEN: soft, nontender, no hepatosplenomegaly, no masses and bowel sounds normal  MS: no gross musculoskeletal defects noted, no edema  NEURO: Normal strength and tone, mentation intact and speech normal  PSYCH: " mentation appears normal, affect normal/bright    Lab Results   Component Value Date    A1C 5.9 04/11/2023         Suzette Justice MD

## 2023-04-13 ENCOUNTER — MYC MEDICAL ADVICE (OUTPATIENT)
Dept: FAMILY MEDICINE | Facility: CLINIC | Age: 38
End: 2023-04-13
Payer: COMMERCIAL

## 2023-04-20 ASSESSMENT — ENCOUNTER SYMPTOMS
HEADACHES: 0
HEMATOCHEZIA: 0
CHILLS: 0
ARTHRALGIAS: 0
JOINT SWELLING: 0
HEARTBURN: 0
DYSURIA: 0
MYALGIAS: 0
FREQUENCY: 0
PALPITATIONS: 0
NERVOUS/ANXIOUS: 0
ABDOMINAL PAIN: 0
CONSTIPATION: 0
WEAKNESS: 0
EYE PAIN: 0
SHORTNESS OF BREATH: 0
SORE THROAT: 0
NAUSEA: 0
HEMATURIA: 0
DIZZINESS: 0
COUGH: 0
DIARRHEA: 0
PARESTHESIAS: 0
FEVER: 0

## 2023-04-24 ENCOUNTER — OFFICE VISIT (OUTPATIENT)
Dept: FAMILY MEDICINE | Facility: CLINIC | Age: 38
End: 2023-04-24
Payer: COMMERCIAL

## 2023-04-24 VITALS
RESPIRATION RATE: 16 BRPM | HEIGHT: 68 IN | WEIGHT: 208 LBS | BODY MASS INDEX: 31.52 KG/M2 | HEART RATE: 76 BPM | SYSTOLIC BLOOD PRESSURE: 110 MMHG | DIASTOLIC BLOOD PRESSURE: 80 MMHG | OXYGEN SATURATION: 97 % | TEMPERATURE: 97.6 F

## 2023-04-24 DIAGNOSIS — R73.03 PRE-DIABETES: ICD-10-CM

## 2023-04-24 DIAGNOSIS — F41.9 ANXIETY AND DEPRESSION: ICD-10-CM

## 2023-04-24 DIAGNOSIS — F32.A ANXIETY AND DEPRESSION: ICD-10-CM

## 2023-04-24 DIAGNOSIS — K21.9 GASTROESOPHAGEAL REFLUX DISEASE WITHOUT ESOPHAGITIS: ICD-10-CM

## 2023-04-24 DIAGNOSIS — I10 HYPERTENSION GOAL BP (BLOOD PRESSURE) < 130/80: ICD-10-CM

## 2023-04-24 DIAGNOSIS — Z13.6 CARDIOVASCULAR SCREENING; LDL GOAL LESS THAN 160: ICD-10-CM

## 2023-04-24 DIAGNOSIS — Z00.00 ROUTINE GENERAL MEDICAL EXAMINATION AT A HEALTH CARE FACILITY: Primary | ICD-10-CM

## 2023-04-24 DIAGNOSIS — G43.109 MIGRAINE WITH AURA AND WITHOUT STATUS MIGRAINOSUS, NOT INTRACTABLE: ICD-10-CM

## 2023-04-24 LAB
CHOLEST SERPL-MCNC: 280 MG/DL
CREAT UR-MCNC: 212 MG/DL
HDLC SERPL-MCNC: 43 MG/DL
LDLC SERPL CALC-MCNC: 165 MG/DL
MICROALBUMIN UR-MCNC: <12 MG/L
MICROALBUMIN/CREAT UR: NORMAL MG/G{CREAT}
NONHDLC SERPL-MCNC: 237 MG/DL
TRIGL SERPL-MCNC: 361 MG/DL

## 2023-04-24 PROCEDURE — 82043 UR ALBUMIN QUANTITATIVE: CPT | Performed by: FAMILY MEDICINE

## 2023-04-24 PROCEDURE — 80061 LIPID PANEL: CPT | Performed by: FAMILY MEDICINE

## 2023-04-24 PROCEDURE — 36415 COLL VENOUS BLD VENIPUNCTURE: CPT | Performed by: FAMILY MEDICINE

## 2023-04-24 PROCEDURE — 99214 OFFICE O/P EST MOD 30 MIN: CPT | Mod: 25 | Performed by: FAMILY MEDICINE

## 2023-04-24 PROCEDURE — 99395 PREV VISIT EST AGE 18-39: CPT | Performed by: FAMILY MEDICINE

## 2023-04-24 PROCEDURE — 82570 ASSAY OF URINE CREATININE: CPT | Performed by: FAMILY MEDICINE

## 2023-04-24 RX ORDER — SUMATRIPTAN 50 MG/1
TABLET, FILM COATED ORAL
Qty: 9 TABLET | Refills: 3 | Status: SHIPPED | OUTPATIENT
Start: 2023-04-24 | End: 2024-02-12

## 2023-04-24 RX ORDER — SERTRALINE HYDROCHLORIDE 100 MG/1
100 TABLET, FILM COATED ORAL DAILY
Qty: 90 TABLET | Refills: 3 | Status: SHIPPED | OUTPATIENT
Start: 2023-04-24 | End: 2024-03-05

## 2023-04-24 RX ORDER — METFORMIN HCL 500 MG
TABLET, EXTENDED RELEASE 24 HR ORAL
Qty: 180 TABLET | Refills: 0 | Status: SHIPPED | OUTPATIENT
Start: 2023-04-24 | End: 2023-04-24

## 2023-04-24 RX ORDER — METOPROLOL SUCCINATE 25 MG/1
25 TABLET, EXTENDED RELEASE ORAL DAILY
Qty: 90 TABLET | Refills: 3 | Status: SHIPPED | OUTPATIENT
Start: 2023-04-24 | End: 2024-02-12

## 2023-04-24 RX ORDER — METFORMIN HCL 500 MG
TABLET, EXTENDED RELEASE 24 HR ORAL
Qty: 180 TABLET | Refills: 3 | Status: SHIPPED | OUTPATIENT
Start: 2023-04-24 | End: 2024-04-04 | Stop reason: SINTOL

## 2023-04-24 RX ORDER — LISINOPRIL AND HYDROCHLOROTHIAZIDE 20; 25 MG/1; MG/1
1 TABLET ORAL DAILY
Qty: 90 TABLET | Refills: 3 | Status: SHIPPED | OUTPATIENT
Start: 2023-04-24 | End: 2024-02-12

## 2023-04-24 ASSESSMENT — ENCOUNTER SYMPTOMS
HEADACHES: 0
PARESTHESIAS: 0
FEVER: 0
PALPITATIONS: 0
NERVOUS/ANXIOUS: 0
FREQUENCY: 0
ARTHRALGIAS: 0
DIARRHEA: 0
HEMATURIA: 0
DIZZINESS: 0
SHORTNESS OF BREATH: 0
ABDOMINAL PAIN: 0
COUGH: 0
DYSURIA: 0
WEAKNESS: 0
CONSTIPATION: 0
EYE PAIN: 0
HEMATOCHEZIA: 0
JOINT SWELLING: 0
NAUSEA: 0
HEARTBURN: 0
MYALGIAS: 0
CHILLS: 0
SORE THROAT: 0

## 2023-04-24 ASSESSMENT — PATIENT HEALTH QUESTIONNAIRE - PHQ9
SUM OF ALL RESPONSES TO PHQ QUESTIONS 1-9: 6
SUM OF ALL RESPONSES TO PHQ QUESTIONS 1-9: 6
10. IF YOU CHECKED OFF ANY PROBLEMS, HOW DIFFICULT HAVE THESE PROBLEMS MADE IT FOR YOU TO DO YOUR WORK, TAKE CARE OF THINGS AT HOME, OR GET ALONG WITH OTHER PEOPLE: SOMEWHAT DIFFICULT

## 2023-04-24 ASSESSMENT — PAIN SCALES - GENERAL: PAINLEVEL: NO PAIN (0)

## 2023-04-24 NOTE — PATIENT INSTRUCTIONS
Reviewed ddiet/exercise strategies for pre=diabetes. Discussed disease pathophysiology/sequelae.     Start metformin, titrate to 1, 000mg/day.     Re-check a1c six months, follow up as needed .     Otherwise labs today, refills and follow up one year.       Preventive Health Recommendations  Male Ages 26 - 39    Yearly exam:             See your health care provider every year in order to  o   Review health changes.   o   Discuss preventive care.    o   Review your medicines if your doctor has prescribed any.  You should be tested each year for STDs (sexually transmitted diseases), if you re at risk.   After age 35, talk to your provider about cholesterol testing. If you are at risk for heart disease, have your cholesterol tested at least every 5 years.   If you are at risk for diabetes, you should have a diabetes test (fasting glucose).  Shots: Get a flu shot each year. Get a tetanus shot every 10 years.     Nutrition:  Eat at least 5 servings of fruits and vegetables daily.   Eat whole-grain bread, whole-wheat pasta and brown rice instead of white grains and rice.   Get adequate Calcium and Vitamin D.     Lifestyle  Exercise for at least 150 minutes a week (30 minutes a day, 5 days a week). This will help you control your weight and prevent disease.   Limit alcohol to one drink per day.   No smoking.   Wear sunscreen to prevent skin cancer.   See your dentist every six months for an exam and cleaning.

## 2023-04-24 NOTE — PROGRESS NOTES
SUBJECTIVE:   CC: Viet is an 38 year old who presents for preventative health visit.       2023    11:21 AM   Additional Questions   Roomed by Staci KELSEY     Patient has been advised of split billing requirements and indicates understanding: Yes  Healthy Habits:     Getting at least 3 servings of Calcium per day:  Yes    Bi-annual eye exam:  Yes    Dental care twice a year:  Yes    Sleep apnea or symptoms of sleep apnea:  Daytime drowsiness and Sleep apnea    Diet:  Regular (no restrictions)    Frequency of exercise:  2-3 days/week    Duration of exercise:  15-30 minutes    Taking medications regularly:  Yes    Medication side effects:  Not applicable    PHQ-2 Total Score: 2    Additional concerns today:  Yes (discuss pre-diabetes and would like more sumatriptan)      Hypertension Follow-up      Do you check your blood pressure regularly outside of the clinic? No     Are you following a low salt diet? No    Are your blood pressures ever more than 140 on the top number (systolic) OR more   than 90 on the bottom number (diastolic), for example 140/90? No    Depression and Anxiety Follow-Up    How are you doing with your depression since your last visit? No change    How are you doing with your anxiety since your last visit?  No change    Are you having other symptoms that might be associated with depression or anxiety? No    Have you had a significant life event? OTHER: cat passed away in Dec.     Do you have any concerns with your use of alcohol or other drugs? No    Social History     Tobacco Use     Smoking status: Former     Types: Cigarettes     Quit date: 2010     Years since quittin.3     Passive exposure: Never     Smokeless tobacco: Never   Vaping Use     Vaping status: Never Used     Passive vaping exposure: Yes   Substance Use Topics     Alcohol use: Yes     Comment: Drinks over rhe w/e. 5 drinks weekly     Drug use: Never     Comment: Marihuana every 3rd day.         10/24/2022     9:01 AM  4/11/2023     7:08 AM 4/24/2023    11:11 AM   PHQ   PHQ-9 Total Score 3 6 6   Q9: Thoughts of better off dead/self-harm past 2 weeks Not at all Not at all Not at all         3/28/2022     9:22 AM 5/17/2022     3:34 PM 4/11/2023     7:09 AM   MONIK-7 SCORE   Total Score 6 (mild anxiety) 7 (mild anxiety) 6 (mild anxiety)   Total Score 6 7 6         4/24/2023    11:11 AM   Last PHQ-9   1.  Little interest or pleasure in doing things 1   2.  Feeling down, depressed, or hopeless 1   3.  Trouble falling or staying asleep, or sleeping too much 1   4.  Feeling tired or having little energy 1   5.  Poor appetite or overeating 1   6.  Feeling bad about yourself 1   7.  Trouble concentrating 0   8.  Moving slowly or restless 0   Q9: Thoughts of better off dead/self-harm past 2 weeks 0   PHQ-9 Total Score 6         4/11/2023     7:09 AM   MONIK-7    1. Feeling nervous, anxious, or on edge 1   2. Not being able to stop or control worrying 1   3. Worrying too much about different things 1   4. Trouble relaxing 1   5. Being so restless that it is hard to sit still 1   6. Becoming easily annoyed or irritable 1   7. Feeling afraid, as if something awful might happen 0   MONIK-7 Total Score 6   If you checked any problems, how difficult have they made it for you to do your work, take care of things at home, or get along with other people? Somewhat difficult       Suicide Assessment Five-step Evaluation and Treatment (SAFE-T)    Migraine     Since your last clinic visit, how have your headaches changed?  No change    How often are you getting headaches or migraines? 2-3 times a week avg     Are you able to do normal daily activities when you have a migraine? Yes    Are you taking rescue/relief medications? (Select all that apply) sumatriptan (Imitrex)    How helpful is your rescue/relief medication?  I get total relief    Are you taking any medications to prevent migraines? (Select all that apply)  No    In the past 4 weeks, how often have  you gone to urgent care or the emergency room because of your headaches?  0      Today's PHQ-2 Score:       2023     3:58 PM   PHQ-2 (  [employer])   Q1: Little interest or pleasure in doing things 1   Q2: Feeling down, depressed or hopeless 1   PHQ-2 Score 2   Q1: Little interest or pleasure in doing things Several days   Q2: Feeling down, depressed or hopeless Several days   PHQ-2 Score 2           Social History     Tobacco Use     Smoking status: Former     Types: Cigarettes     Quit date: 2010     Years since quittin.3     Passive exposure: Never     Smokeless tobacco: Never   Vaping Use     Vaping status: Never Used     Passive vaping exposure: Yes   Substance Use Topics     Alcohol use: Yes     Comment: Drinks over rhe w/e. 5 drinks weekly             2023     3:58 PM   Alcohol Use   Prescreen: >3 drinks/day or >7 drinks/week? No          View : No data to display.                Last PSA: No results found for: PSA    Reviewed orders with patient. Reviewed health maintenance and updated orders accordingly - Yes  Lab work is in process    Reviewed and updated as needed this visit by clinical staff   Tobacco  Allergies  Meds              Reviewed and updated as needed this visit by Provider                 Past Medical History:   Diagnosis Date     Anxiety and depression     Sees Psychiatrist, [place], [place]. Depression for many years     Depressive disorder      Hypertension 2012     IBS (irritable bowel syndrome)       Past Surgical History:   Procedure Laterality Date     APPENDECTOMY         Review of Systems   Constitutional: Negative for chills and fever.   HENT: Negative for congestion, ear pain, hearing loss and sore throat.    Eyes: Negative for pain and visual disturbance.   Respiratory: Negative for cough and shortness of breath.    Cardiovascular: Negative for chest pain, palpitations and peripheral edema.   Gastrointestinal: Negative for abdominal pain, constipation,  diarrhea, heartburn, hematochezia and nausea.   Genitourinary: Negative for dysuria, frequency, genital sores, hematuria, impotence, penile discharge and urgency.   Musculoskeletal: Negative for arthralgias, joint swelling and myalgias.   Skin: Negative for rash.   Neurological: Negative for dizziness, weakness, headaches and paresthesias.   Psychiatric/Behavioral: Negative for mood changes. The patient is not nervous/anxious.          OBJECTIVE:   There were no vitals taken for this visit.    Physical Exam  GENERAL: healthy, alert and no distress  EYES: Eyes grossly normal to inspection, PERRL and conjunctivae and sclerae normal  HENT: ear canals and TM's normal, nose and mouth without ulcers or lesions  NECK: no adenopathy, no asymmetry, masses, or scars and thyroid normal to palpation  RESP: lungs clear to auscultation - no rales, rhonchi or wheezes  CV: regular rate and rhythm, normal S1 S2, no S3 or S4, no murmur, click or rub, no peripheral edema and peripheral pulses strong  ABDOMEN: soft, nontender, no hepatosplenomegaly, no masses and bowel sounds normal   (male): normal male genitalia without lesions or urethral discharge, no hernia  MS: no gross musculoskeletal defects noted, no edema  SKIN: no suspicious lesions or rashes  NEURO: Normal strength and tone, mentation intact and speech normal  PSYCH: mentation appears normal, affect normal/bright    Diagnostic Test Results:  Labs reviewed in Epic    ASSESSMENT/PLAN:   Reviewed ddiet/exercise strategies for pre=diabetes. Discussed disease pathophysiology/sequelae, genetic/lifestyle compoonent of diabetes/pre diabetes, how vascular damage works, role of prevention of progression, plate method for diet/weight loss.     Start metformin, titrate to 1, 000mg/day. Reviewed common/serious side effects of metformin and hot to titrate.     Re-check a1c six months, follow up as needed .     Otherwise labs today, refills and follow up one year.       ICD-10-CM    1.  Routine general medical examination at a health care facility  Z00.00       2. Anxiety and depression  F41.9 sertraline (ZOLOFT) 100 MG tablet    F32.A       3. Migraine with aura and without status migrainosus, not intractable  G43.109 SUMAtriptan (IMITREX) 50 MG tablet     metoprolol succinate ER (TOPROL XL) 25 MG 24 hr tablet      4. Gastroesophageal reflux disease without esophagitis  K21.9 omeprazole (PRILOSEC) 20 MG DR capsule      5. Hypertension goal BP (blood pressure) < 130/80  I10 Albumin Random Urine Quantitative with Creat Ratio     lisinopril-hydrochlorothiazide (ZESTORETIC) 20-25 MG tablet     metoprolol succinate ER (TOPROL XL) 25 MG 24 hr tablet     Albumin Random Urine Quantitative with Creat Ratio      6. Pre-diabetes  R73.03 metFORMIN (GLUCOPHAGE XR) 500 MG 24 hr tablet     Hemoglobin A1c     DISCONTINUED: metFORMIN (GLUCOPHAGE XR) 500 MG 24 hr tablet      7. CARDIOVASCULAR SCREENING; LDL GOAL LESS THAN 160  Z13.6 Lipid panel reflex to direct LDL Fasting     Lipid panel reflex to direct LDL Fasting          Patient has been advised of split billing requirements and indicates understanding: Yes    In addition to preventive exam and counseling 35 minutes spent on the date of the encounter doing chart review, history and exam, negotiating and explaining the plan with the patient, documentation and further activities as noted above.           COUNSELING:   Reviewed preventive health counseling, as reflected in patient instructions       Regular exercise       Healthy diet/nutrition       Alcohol Use        immunizations        He reports that he quit smoking about 13 years ago. His smoking use included cigarettes. He has never been exposed to tobacco smoke. He has never used smokeless tobacco.            Joel Daniel Wegener, MD  [facility]  Answers for HPI/ROS submitted by the patient on 4/24/2023  If you checked off any problems, how difficult have these problems made it for you  to do your work, take care of things at home, or get along with other people?: Somewhat difficult  PHQ9 TOTAL SCORE: 6

## 2024-01-11 ENCOUNTER — NURSE TRIAGE (OUTPATIENT)
Dept: NURSING | Facility: CLINIC | Age: 39
End: 2024-01-11
Payer: COMMERCIAL

## 2024-01-11 ENCOUNTER — HOSPITAL ENCOUNTER (EMERGENCY)
Facility: CLINIC | Age: 39
Discharge: HOME | End: 2024-01-11
Attending: EMERGENCY MEDICINE | Admitting: EMERGENCY MEDICINE
Payer: COMMERCIAL

## 2024-01-11 ENCOUNTER — APPOINTMENT (OUTPATIENT)
Dept: RADIOLOGY | Facility: CLINIC | Age: 39
End: 2024-01-11
Attending: EMERGENCY MEDICINE
Payer: COMMERCIAL

## 2024-01-11 VITALS
OXYGEN SATURATION: 99 % | HEART RATE: 89 BPM | HEIGHT: 68 IN | DIASTOLIC BLOOD PRESSURE: 81 MMHG | WEIGHT: 200 LBS | RESPIRATION RATE: 16 BRPM | BODY MASS INDEX: 30.31 KG/M2 | TEMPERATURE: 99.4 F | SYSTOLIC BLOOD PRESSURE: 140 MMHG

## 2024-01-11 DIAGNOSIS — S23.41XA SPRAIN OF COSTAL CARTILAGE, INITIAL ENCOUNTER: ICD-10-CM

## 2024-01-11 PROCEDURE — 250N000011 HC RX IP 250 OP 636: Performed by: EMERGENCY MEDICINE

## 2024-01-11 PROCEDURE — 99284 EMERGENCY DEPT VISIT MOD MDM: CPT | Mod: 25

## 2024-01-11 PROCEDURE — 250N000013 HC RX MED GY IP 250 OP 250 PS 637: Performed by: EMERGENCY MEDICINE

## 2024-01-11 PROCEDURE — 96372 THER/PROPH/DIAG INJ SC/IM: CPT | Performed by: EMERGENCY MEDICINE

## 2024-01-11 PROCEDURE — 71046 X-RAY EXAM CHEST 2 VIEWS: CPT

## 2024-01-11 RX ORDER — LIDOCAINE 4 G/G
2 PATCH TOPICAL ONCE
Status: DISCONTINUED | OUTPATIENT
Start: 2024-01-11 | End: 2024-01-12 | Stop reason: HOSPADM

## 2024-01-11 RX ORDER — ONDANSETRON 8 MG/1
8 TABLET, ORALLY DISINTEGRATING ORAL EVERY 8 HOURS PRN
Qty: 12 TABLET | Refills: 0 | Status: SHIPPED | OUTPATIENT
Start: 2024-01-11 | End: 2024-06-07

## 2024-01-11 RX ORDER — LIDOCAINE 50 MG/G
1 PATCH TOPICAL EVERY 24 HOURS
Qty: 10 PATCH | Refills: 0 | Status: SHIPPED | OUTPATIENT
Start: 2024-01-11 | End: 2024-01-21

## 2024-01-11 RX ORDER — KETOROLAC TROMETHAMINE 30 MG/ML
30 INJECTION, SOLUTION INTRAMUSCULAR; INTRAVENOUS ONCE
Status: COMPLETED | OUTPATIENT
Start: 2024-01-11 | End: 2024-01-11

## 2024-01-11 RX ORDER — ONDANSETRON 4 MG/1
8 TABLET, ORALLY DISINTEGRATING ORAL ONCE
Status: COMPLETED | OUTPATIENT
Start: 2024-01-11 | End: 2024-01-11

## 2024-01-11 RX ADMIN — ONDANSETRON 8 MG: 4 TABLET, ORALLY DISINTEGRATING ORAL at 22:28

## 2024-01-11 RX ADMIN — LIDOCAINE 2 PATCH: 4 PATCH TOPICAL at 22:29

## 2024-01-11 RX ADMIN — KETOROLAC TROMETHAMINE 30 MG: 30 INJECTION, SOLUTION INTRAMUSCULAR at 22:26

## 2024-01-11 ASSESSMENT — ENCOUNTER SYMPTOMS: BACK PAIN: 1

## 2024-01-12 NOTE — ED PROVIDER NOTES
1050-MOTHER STATES DID NOT FEED INFANT, STRESS IMPORTANCE AND INSTRUCTED ON MAINTAINING HEALTHLY BLOOD SUGAR LEVELS , MOTHE ASST WITH LATCH AND WILL WARM STORED BREASTMILK.   EMERGENCY DEPARTMENT ENCOUNTER      NAME: Viet Beebe  AGE: 38 year old male  YOB: 1985  MRN: [id number]  EVALUATION DATE & TIME: No admission date for patient encounter.    PCP: Wegener, Joel Daniel Irwin    ED PROVIDER: Nieves Mcfadden M.D.      Chief Complaint   Patient presents with    Rib Injury     Posterior right rib          FINAL IMPRESSION:  1. Sprain of costal cartilage, initial encounter        MEDICAL DECISION MAKING:    Pertinent Labs & Imaging studies reviewed. (See chart for details)  ED Course as of 01/11/24 2207   Thu Jan 11, 2024   2140 Afebrile.  Vital signs here with mild hypertension.  Patient coming in for evaluation of posterior and lateral rib pain.  Slid down stairs on 1/6 where he slipped and landed on his back.  Did not strike his head or lose consciousness.  Slid down the stairs.  This happened on 1/6.  Afterwards he was having some muscle spasming in the area.  He states the muscle spasming today is much improved but he felt like he could feel a rib move so he got concerned and came in here.  No shortness of breath.  Pain is worse with palpation and movement.  He has been taking Tylenol for pain.  Pain is making him feel slightly nauseated.  Has not taken anything for nausea.    Exam for patient here with posterior rib tenderness along his sixth rib posteriorly and laterally.  Bruising noted in this area appears multiple days old.  Otherwise lungs sound clear to auscultation bilaterally.  No tachypnea.  No increased work of breathing.  Otherwise unremarkable.    Will place a Lidoderm patch, Toradol shot, Zofran and get a chest x-ray.   2207 X-rays here without any acute fracture.  Plan to discharge home with Lidoderm patch, Zofran for nausea.         Medical Decision Making  Obtained supplemental history:Supplemental history obtained?: Documented in chart  Reviewed external records: External records reviewed?: Documented in chart  Care impacted by chronic  illness:Hypertension  Care significantly affected by social determinants of health:Access to Medical Care  Did you consider but not order tests?: Work up considered but not performed and documented in chart, if applicable  Did you interpret images independently?: Independent interpretation of ECG and images noted in documentation, when applicable.  Consultation discussion with other provider:Did you involve another provider (consultant, MH, pharmacy, etc.)?: No  Discharge. I prescribed additional prescription strength medication(s) as charted. See documentation for any additional details.      Critical care: 0 minutes excluding separately billable procedures.  Includes bedside management, time reviewing test results, review of records, discussing the case with staff, documenting the medical record and time spent with family members (or surrogate decision makers) discussing specific treatment issues.          ED COURSE:  9:39 PM I met with the patient, obtained history, performed an initial exam, and discussed options and plan for diagnostics and treatment here in the ED.     The importance of close follow up was discussed. We reviewed warning signs and symptoms, and I instructed Mr. Beebe to return to the emergency department immediately if he develops any new or worsening symptoms. I provided additional verbal discharge instructions. Mr. Beebe expressed understanding and agreement with this plan of care, his questions were answered, and he was discharged in stable condition.     MEDICATIONS GIVEN IN THE EMERGENCY:  Medications   Lidocaine (LIDOCARE) 4 % Patch 2 patch (has no administration in time range)   ketorolac (TORADOL) injection 30 mg (has no administration in time range)   ondansetron (ZOFRAN ODT) ODT tab 8 mg (has no administration in time range)       NEW PRESCRIPTIONS STARTED AT TODAY'S ER VISIT:  New Prescriptions    LIDOCAINE (LIDODERM) 5 % PATCH    Place 1 patch onto the skin every 24 hours for 10  days    ONDANSETRON (ZOFRAN ODT) 8 MG ODT TAB    Take 1 tablet (8 mg) by mouth every 8 hours as needed for nausea          =================================================================    HPI    Patient information was obtained from: The patient    Use of : N/A         Viet Beebe is a 38 year old male who presents to the ER via walk-in for an evaluation of rib injury.    The patient reports fell while walking down the stairs on 01/06 (5 days ago). He states landing on the right side of his back and sliding down the stairs. He initially felt muscle spasm near the right side of his mid-back, but it resolved yesterday. He is concerned because he can feel felt his right posterior rib move with associated pain and nausea.    Otherwise, the patient denied any other medical complaints or concerns at this time.        REVIEW OF SYSTEMS   Review of Systems   Musculoskeletal:  Positive for back pain (spasms to right side of mid-back, resolved).        Positive for right rib pain     All other systems reviewed and are negative.        PAST MEDICAL HISTORY:  Past Medical History:   Diagnosis Date    Anxiety and depression     Sees Psychiatrist, [place], [place]. Depression for many years    Depressive disorder     Hypertension 2012    IBS (irritable bowel syndrome)        PAST SURGICAL HISTORY:  Past Surgical History:   Procedure Laterality Date    APPENDECTOMY  2007       CURRENT MEDICATIONS:      Current Facility-Administered Medications:     ketorolac (TORADOL) injection 30 mg, 30 mg, Intramuscular, Once, Nieves Mcfadden MD    Lidocaine (LIDOCARE) 4 % Patch 2 patch, 2 patch, Transdermal, Once, Nieves Mcfadden MD    ondansetron (ZOFRAN ODT) ODT tab 8 mg, 8 mg, Oral, Once, Nieves Mcfadden MD    Current Outpatient Medications:     lidocaine (LIDODERM) 5 % patch, Place 1 patch onto the skin every 24 hours for 10 days, Disp: 10 patch, Rfl: 0    ondansetron (ZOFRAN ODT) 8 MG ODT tab, Take 1 tablet (8 mg) by mouth  every 8 hours as needed for nausea, Disp: 12 tablet, Rfl: 0    cholecalciferol (VITAMIN D3) 25 mcg (1000 units) capsule, Take 1 capsule (25 mcg) by mouth daily, Disp: 90 capsule, Rfl: 1    DIPHENHYDRAMINE HCL PO, Take 25 mg by mouth nightly as needed, Disp: , Rfl:     lisinopril-hydrochlorothiazide (ZESTORETIC) 20-25 MG tablet, Take 1 tablet by mouth daily, Disp: 90 tablet, Rfl: 3    MELATONIN PO, Take by mouth At Bedtime, Disp: , Rfl:     metFORMIN (GLUCOPHAGE XR) 500 MG 24 hr tablet, Start one tablet daily for  2-4 weeks then increase to two tablets daily, Disp: 180 tablet, Rfl: 3    metoprolol succinate ER (TOPROL XL) 25 MG 24 hr tablet, Take 1 tablet (25 mg) by mouth daily, Disp: 90 tablet, Rfl: 3    multivitamin w/minerals (THERA-VIT-M) tablet, Take 1 tablet by mouth daily., Disp: 100 tablet, Rfl: 3    omeprazole (PRILOSEC) 20 MG DR capsule, TAKE 1 CAPSULE 30 TO 60 MINUTES BEFORE A MEAL, Disp: 90 capsule, Rfl: 3    sertraline (ZOLOFT) 100 MG tablet, Take 1 tablet (100 mg) by mouth daily, Disp: 90 tablet, Rfl: 3    SUMAtriptan (IMITREX) 50 MG tablet, TAKE 1 TABLET AT ONSET OF HEADACHE FOR MIGRAINE MAY REPEAT IN 2 HOURS. MAXIMUM 4 TABLETS IN 24 HOURS, Disp: 9 tablet, Rfl: 3    ALLERGIES:  No Known Allergies    FAMILY HISTORY:  Family History   Problem Relation Age of Onset    Skin Cancer Mother     Cancer Maternal Grandmother     Heart Disease Maternal Grandfather         MI    Cerebrovascular Disease Maternal Grandfather 40    Alzheimer Disease Paternal Grandfather 70        Alzheimers       SOCIAL HISTORY:   Social History     Socioeconomic History    Marital status: Single    Number of children: 0    Years of education: 17   Occupational History    Occupation: Electrical Enginner     Employer: [employer]   Tobacco Use    Smoking status: Former     Types: Cigarettes     Quit date: 2010     Years since quittin.0     Passive exposure: Never    Smokeless tobacco: Never   Vaping Use    Vaping Use: Never used  "  Substance and Sexual Activity    Alcohol use: Yes     Comment: Drinks over rhe w/e. 5 drinks weekly    Drug use: Never     Comment: Marihuana every 3rd day.    Sexual activity: Not Currently     Partners: Female     Birth control/protection: Condom   Other Topics Concern    Parent/sibling w/ CABG, MI or angioplasty before 65F 55M? No       PHYSICAL EXAM:    Vitals: BP (!) 140/81   Pulse 89   Temp 99.4  F (37.4  C) (Oral)   Resp 16   Ht 1.727 m (5' 8\")   Wt 90.7 kg (200 lb)   SpO2 99%   BMI 30.41 kg/m     General:. Alert and interactive, comfortable appearing.  HENT: Oropharynx without erythema or exudates. MMM.  TMs clear bilaterally.  Eyes: Pupils mid-sized and equally reactive.   Neck: Full AROM.  No midline tenderness to palpation.  Cardiovascular: Regular rate and rhythm. Peripheral pulses 2+ bilaterally.  Chest/Pulmonary: Normal work of breathing. Lung sounds clear and equal throughout, no wheezes or crackles. No chest wall tenderness or deformities.  Abdomen: Soft, nondistended. Nontender without guarding or rebound.  Back/Spine: No CVA or midline tenderness. Posterior rib tenderness along his sixth rib posteriorly and laterally, bruising noted in this area appears multiple days old.  Extremities: Normal ROM of all major joints. No lower extremity edema.   Skin: Warm and dry. Normal skin color.   Neuro: Speech clear. CNs grossly intact. Moves all extremities appropriately. Strength and sensation grossly intact to all extremities.   Psych: Normal affect/mood, cooperative, memory appropriate.     LAB:  All pertinent labs reviewed and interpreted.  Labs Ordered and Resulted from Time of ED Arrival to Time of ED Departure - No data to display    RADIOLOGY:  Chest XR,  PA & LAT   Final Result   IMPRESSION: Negative chest. Lungs clear. No pleural effusion or pneumothorax. No rib fracture evident radiographically.          EKG:   None      PROCEDURES:   None      Parminder CORCORAN, am serving as a scribe to " document services personally performed by Dr. Nieves Mcfadden  based on my observation and the provider's statements to me. I, Nieves Mcfadden MD attest that Parminderjaxson Zarco is acting in a scribe capacity, has observed my performance of the services and has documented them in accordance with my direction.      Nieves Mcfadden M.D.  Emergency Medicine  [facility] EMERGENCY ROOM  [address] [address]  [phone]  Dept: [phone]     Nieves Mcfadden MD  01/11/24 5225

## 2024-01-12 NOTE — PROGRESS NOTES
Patient discharged home with AVS and prescription for medications. Will follow up with PCP if symptoms worsening. See MD note for assessment. All questions answered.

## 2024-01-12 NOTE — DISCHARGE INSTRUCTIONS
Thankfully no fractures seen on your x-ray.  Likely bruised rib and muscle spasms.  Use the medications prescribed as needed for symptom control.  Follow-up with primary care.  Return for any new or worsening symptoms.

## 2024-01-12 NOTE — TELEPHONE ENCOUNTER
"Nurse Triage SBAR    Is this a 2nd Level Triage? NO    Situation: Rib Pain/ Injury    Background: :Patient reports falling down the stairs on 1/7/2024    Assessment: He reports severe pain this evening, using ice and heat. He is hearing \"popping sounds\", he reports was able to move the rib to stop the pain, but it moved again to cause severe pain. Denies difficulty breathing, blue lips, severe weakness, or coughing up blood.    Protocol Recommended Disposition:   According to the protocol, patient should go to ED now.  Care advice given. Patient verbalizes understanding and agrees with plan of care.Patient plans to go to [facility] ED.    Lina French RN  01/11/24 8:51 PM  [facility] Nurse Advisor      Reason for Disposition   SEVERE chest pain    Additional Information   Negative: Major injury from dangerous force or speed (e.g., MVA, fall > 10 feet or 3 meters)   Negative: Bullet wound, knife wound, or other penetrating object   Negative: Puncture wound that sounds life-threatening to the triager   Negative: SEVERE difficulty breathing (e.g., struggling for each breath, speaks in single words)   Negative: [1] Major bleeding (e.g., actively dripping or spurting) AND [2] can't be stopped   Negative: Open wound of the chest with sound of moving air (sucking wound) or visible air bubbles   Negative: Shock suspected (e.g., cold/pale/clammy skin, too weak to stand, low BP, rapid pulse)   Negative: Coughing or spitting up blood   Negative: Bluish (or gray) lips or face now   Negative: Unconscious or was unconscious   Negative: Sounds like a life-threatening emergency to the triager   Negative: [1] Injuries at more than 1 site AND [2] unsure which guideline to use   Negative: Chest pain not from an injury OR cause is unknown   Negative: Wound looks infected    Protocols used: Chest Injury-A-AH    "

## 2024-01-12 NOTE — ED TRIAGE NOTES
Patient fell down some stairs on 1/6 and injured his right lower back. Believes he broke a rib. States he can feel something moving      Triage Assessment (Adult)       Row Name 01/11/24 2116          Triage Assessment    Airway WDL WDL        Respiratory WDL    Respiratory WDL WDL  pain with coughing        Skin Circulation/Temperature WDL    Skin Circulation/Temperature WDL WDL        Cardiac WDL    Cardiac WDL WDL        Peripheral/Neurovascular WDL    Peripheral Neurovascular WDL WDL        Cognitive/Neuro/Behavioral WDL    Cognitive/Neuro/Behavioral WDL WDL

## 2024-02-08 ENCOUNTER — IMMUNIZATION (OUTPATIENT)
Dept: NURSING | Facility: CLINIC | Age: 39
End: 2024-02-08
Payer: COMMERCIAL

## 2024-02-08 ENCOUNTER — LAB (OUTPATIENT)
Dept: LAB | Facility: CLINIC | Age: 39
End: 2024-02-08
Payer: COMMERCIAL

## 2024-02-08 DIAGNOSIS — R73.03 PRE-DIABETES: ICD-10-CM

## 2024-02-08 LAB — HBA1C MFR BLD: 5.7 % (ref 0–5.6)

## 2024-02-08 PROCEDURE — 36415 COLL VENOUS BLD VENIPUNCTURE: CPT

## 2024-02-08 PROCEDURE — 83036 HEMOGLOBIN GLYCOSYLATED A1C: CPT

## 2024-02-08 PROCEDURE — 91320 SARSCV2 VAC 30MCG TRS-SUC IM: CPT

## 2024-02-08 PROCEDURE — 90686 IIV4 VACC NO PRSV 0.5 ML IM: CPT

## 2024-02-08 PROCEDURE — 90480 ADMN SARSCOV2 VAC 1/ONLY CMP: CPT

## 2024-02-08 PROCEDURE — 90471 IMMUNIZATION ADMIN: CPT

## 2024-02-12 ENCOUNTER — TELEPHONE (OUTPATIENT)
Dept: FAMILY MEDICINE | Facility: CLINIC | Age: 39
End: 2024-02-12
Payer: COMMERCIAL

## 2024-02-12 DIAGNOSIS — G43.109 MIGRAINE WITH AURA AND WITHOUT STATUS MIGRAINOSUS, NOT INTRACTABLE: ICD-10-CM

## 2024-02-12 DIAGNOSIS — I10 HYPERTENSION GOAL BP (BLOOD PRESSURE) < 130/80: ICD-10-CM

## 2024-02-12 DIAGNOSIS — K21.9 GASTROESOPHAGEAL REFLUX DISEASE WITHOUT ESOPHAGITIS: ICD-10-CM

## 2024-02-12 RX ORDER — LISINOPRIL AND HYDROCHLOROTHIAZIDE 20; 25 MG/1; MG/1
1 TABLET ORAL DAILY
Qty: 90 TABLET | Refills: 0 | Status: SHIPPED | OUTPATIENT
Start: 2024-02-12 | End: 2024-06-07

## 2024-02-12 RX ORDER — SUMATRIPTAN 50 MG/1
TABLET, FILM COATED ORAL
Qty: 9 TABLET | Refills: 0 | Status: SHIPPED | OUTPATIENT
Start: 2024-02-12 | End: 2024-06-07

## 2024-02-12 RX ORDER — METOPROLOL SUCCINATE 25 MG/1
25 TABLET, EXTENDED RELEASE ORAL DAILY
Qty: 90 TABLET | Refills: 0 | Status: SHIPPED | OUTPATIENT
Start: 2024-02-12 | End: 2024-06-07

## 2024-02-26 DIAGNOSIS — F32.A ANXIETY AND DEPRESSION: ICD-10-CM

## 2024-02-26 DIAGNOSIS — F41.9 ANXIETY AND DEPRESSION: ICD-10-CM

## 2024-02-26 RX ORDER — SERTRALINE HYDROCHLORIDE 100 MG/1
100 TABLET, FILM COATED ORAL DAILY
Qty: 90 TABLET | Refills: 3 | OUTPATIENT
Start: 2024-02-26

## 2024-02-26 NOTE — TELEPHONE ENCOUNTER
Topic: Non-Medical Question.     Hi, can you please transfer my prescription to my new mail order pharmacy, similar to the other medications?     Sertraline Hcl Tabs: 100 mg  90 qty  90-day supply     Thank you,  Viet Beebe

## 2024-03-05 RX ORDER — SERTRALINE HYDROCHLORIDE 100 MG/1
100 TABLET, FILM COATED ORAL DAILY
Qty: 90 TABLET | Refills: 0 | Status: SHIPPED | OUTPATIENT
Start: 2024-03-05 | End: 2024-06-07

## 2024-04-04 ENCOUNTER — APPOINTMENT (OUTPATIENT)
Dept: CT IMAGING | Facility: CLINIC | Age: 39
End: 2024-04-04
Attending: FAMILY MEDICINE
Payer: COMMERCIAL

## 2024-04-04 ENCOUNTER — HOSPITAL ENCOUNTER (EMERGENCY)
Facility: CLINIC | Age: 39
Discharge: HOME | End: 2024-04-04
Attending: FAMILY MEDICINE | Admitting: FAMILY MEDICINE
Payer: COMMERCIAL

## 2024-04-04 ENCOUNTER — NURSE TRIAGE (OUTPATIENT)
Dept: FAMILY MEDICINE | Facility: CLINIC | Age: 39
End: 2024-04-04
Payer: COMMERCIAL

## 2024-04-04 VITALS
RESPIRATION RATE: 20 BRPM | HEIGHT: 68 IN | TEMPERATURE: 97.4 F | HEART RATE: 72 BPM | DIASTOLIC BLOOD PRESSURE: 93 MMHG | OXYGEN SATURATION: 92 % | BODY MASS INDEX: 28.79 KG/M2 | WEIGHT: 190 LBS | SYSTOLIC BLOOD PRESSURE: 140 MMHG

## 2024-04-04 DIAGNOSIS — R11.2 NAUSEA AND VOMITING, UNSPECIFIED VOMITING TYPE: ICD-10-CM

## 2024-04-04 DIAGNOSIS — R10.12 LUQ ABDOMINAL PAIN: ICD-10-CM

## 2024-04-04 LAB
ALBUMIN SERPL BCG-MCNC: 5.3 G/DL (ref 3.5–5.2)
ALP SERPL-CCNC: 84 U/L (ref 40–150)
ALT SERPL W P-5'-P-CCNC: 22 U/L (ref 0–70)
ANION GAP SERPL CALCULATED.3IONS-SCNC: 11 MMOL/L (ref 7–15)
AST SERPL W P-5'-P-CCNC: 25 U/L (ref 0–45)
BASOPHILS # BLD AUTO: 0.1 10E3/UL (ref 0–0.2)
BASOPHILS NFR BLD AUTO: 0 %
BILIRUB DIRECT SERPL-MCNC: <0.2 MG/DL (ref 0–0.3)
BILIRUB SERPL-MCNC: 0.5 MG/DL
BUN SERPL-MCNC: 18.7 MG/DL (ref 6–20)
CALCIUM SERPL-MCNC: 10.3 MG/DL (ref 8.6–10)
CHLORIDE SERPL-SCNC: 95 MMOL/L (ref 98–107)
CREAT SERPL-MCNC: 0.89 MG/DL (ref 0.67–1.17)
DEPRECATED HCO3 PLAS-SCNC: 29 MMOL/L (ref 22–29)
EGFRCR SERPLBLD CKD-EPI 2021: >90 ML/MIN/1.73M2
EOSINOPHIL # BLD AUTO: 0.1 10E3/UL (ref 0–0.7)
EOSINOPHIL NFR BLD AUTO: 0 %
ERYTHROCYTE [DISTWIDTH] IN BLOOD BY AUTOMATED COUNT: 12.7 % (ref 10–15)
GLUCOSE SERPL-MCNC: 133 MG/DL (ref 70–99)
HCT VFR BLD AUTO: 48.6 % (ref 40–53)
HGB BLD-MCNC: 16.5 G/DL (ref 13.3–17.7)
HOLD SPECIMEN: NORMAL
HOLD SPECIMEN: NORMAL
IMM GRANULOCYTES # BLD: 0.1 10E3/UL
IMM GRANULOCYTES NFR BLD: 1 %
LIPASE SERPL-CCNC: 31 U/L (ref 13–60)
LYMPHOCYTES # BLD AUTO: 1.4 10E3/UL (ref 0.8–5.3)
LYMPHOCYTES NFR BLD AUTO: 9 %
MCH RBC QN AUTO: 27.9 PG (ref 26.5–33)
MCHC RBC AUTO-ENTMCNC: 34 G/DL (ref 31.5–36.5)
MCV RBC AUTO: 82 FL (ref 78–100)
MONOCYTES # BLD AUTO: 0.5 10E3/UL (ref 0–1.3)
MONOCYTES NFR BLD AUTO: 4 %
NEUTROPHILS # BLD AUTO: 13.3 10E3/UL (ref 1.6–8.3)
NEUTROPHILS NFR BLD AUTO: 86 %
NRBC # BLD AUTO: 0 10E3/UL
NRBC BLD AUTO-RTO: 0 /100
PLATELET # BLD AUTO: 308 10E3/UL (ref 150–450)
POTASSIUM SERPL-SCNC: 3.9 MMOL/L (ref 3.4–5.3)
PROT SERPL-MCNC: 8.3 G/DL (ref 6.4–8.3)
RBC # BLD AUTO: 5.92 10E6/UL (ref 4.4–5.9)
SODIUM SERPL-SCNC: 135 MMOL/L (ref 135–145)
WBC # BLD AUTO: 15.4 10E3/UL (ref 4–11)

## 2024-04-04 PROCEDURE — 96374 THER/PROPH/DIAG INJ IV PUSH: CPT | Mod: 59

## 2024-04-04 PROCEDURE — 80053 COMPREHEN METABOLIC PANEL: CPT | Performed by: FAMILY MEDICINE

## 2024-04-04 PROCEDURE — 96361 HYDRATE IV INFUSION ADD-ON: CPT

## 2024-04-04 PROCEDURE — 74177 CT ABD & PELVIS W/CONTRAST: CPT

## 2024-04-04 PROCEDURE — 250N000011 HC RX IP 250 OP 636: Performed by: FAMILY MEDICINE

## 2024-04-04 PROCEDURE — 85004 AUTOMATED DIFF WBC COUNT: CPT | Performed by: FAMILY MEDICINE

## 2024-04-04 PROCEDURE — 36415 COLL VENOUS BLD VENIPUNCTURE: CPT | Performed by: FAMILY MEDICINE

## 2024-04-04 PROCEDURE — 258N000003 HC RX IP 258 OP 636: Performed by: FAMILY MEDICINE

## 2024-04-04 PROCEDURE — 83690 ASSAY OF LIPASE: CPT | Performed by: FAMILY MEDICINE

## 2024-04-04 PROCEDURE — 99285 EMERGENCY DEPT VISIT HI MDM: CPT | Mod: 25

## 2024-04-04 RX ORDER — IOPAMIDOL 755 MG/ML
90 INJECTION, SOLUTION INTRAVASCULAR ONCE
Status: COMPLETED | OUTPATIENT
Start: 2024-04-04 | End: 2024-04-04

## 2024-04-04 RX ORDER — SUCRALFATE 1 G/1
1 TABLET ORAL 2 TIMES DAILY
Qty: 14 TABLET | Refills: 0 | Status: SHIPPED | OUTPATIENT
Start: 2024-04-04 | End: 2024-06-07

## 2024-04-04 RX ORDER — METOCLOPRAMIDE 10 MG/1
10 TABLET ORAL
Qty: 30 TABLET | Refills: 0 | Status: SHIPPED | OUTPATIENT
Start: 2024-04-04 | End: 2024-06-07

## 2024-04-04 RX ORDER — ONDANSETRON 2 MG/ML
4 INJECTION INTRAMUSCULAR; INTRAVENOUS ONCE
Status: COMPLETED | OUTPATIENT
Start: 2024-04-04 | End: 2024-04-04

## 2024-04-04 RX ADMIN — SODIUM CHLORIDE 1000 ML: 9 INJECTION, SOLUTION INTRAVENOUS at 12:33

## 2024-04-04 RX ADMIN — ONDANSETRON 4 MG: 2 INJECTION INTRAMUSCULAR; INTRAVENOUS at 12:36

## 2024-04-04 RX ADMIN — IOPAMIDOL 90 ML: 755 INJECTION, SOLUTION INTRAVENOUS at 13:37

## 2024-04-04 ASSESSMENT — COLUMBIA-SUICIDE SEVERITY RATING SCALE - C-SSRS
2. HAVE YOU ACTUALLY HAD ANY THOUGHTS OF KILLING YOURSELF IN THE PAST MONTH?: NO
1. IN THE PAST MONTH, HAVE YOU WISHED YOU WERE DEAD OR WISHED YOU COULD GO TO SLEEP AND NOT WAKE UP?: NO
6. HAVE YOU EVER DONE ANYTHING, STARTED TO DO ANYTHING, OR PREPARED TO DO ANYTHING TO END YOUR LIFE?: NO

## 2024-04-04 NOTE — ED TRIAGE NOTES
"Has been having LUQ pain for some time but has been worse over the last few days. States pain worse in mornings, feels better in afternoon and then pain, nausea and vomiting return at night. Past history of abd surgery years ago when they stretched \"the ligament of Trieitz\".  States has had decreased appetite and has lost weight. Endorses cold sweats, nausea and vomiting. Last BM was yesterday and states very small balls that was hard to pass.      Triage Assessment (Adult)       Row Name 04/04/24 1207          Triage Assessment    Airway WDL WDL        Respiratory WDL    Respiratory WDL WDL        Skin Circulation/Temperature WDL    Skin Circulation/Temperature WDL WDL                     "

## 2024-04-04 NOTE — ED PROVIDER NOTES
EMERGENCY DEPARTMENT ENCOUNTER      NAME: Viet Beebe  AGE: 39 year old male  YOB: 1985  MRN: [id number]  EVALUATION DATE & TIME: No admission date for patient encounter.    PCP: Wegener, Joel Daniel Irwin    ED PROVIDER: Gary Olivo M.D.    Chief Complaint   Patient presents with    Abdominal Pain    Constipation       FINAL IMPRESSION:  1. LUQ abdominal pain    2. Nausea and vomiting, unspecified vomiting type        ED COURSE & MEDICAL DECISION MAKING:      Pertinent Labs & Imaging studies independently interpreted by me. (See chart for details)  Reviewed prior primary care visit from April 2023 which was routine physical which also addressed issues of anxiety depression, migraine, reflux, hypertension and prediabetes, at that time hemoglobin A1c 5.7.    ED Course as of 04/04/24 1458   Thu Apr 04, 2024   1305 Labs ordered and independently interpreted by me with leukocytosis, normal hepatic panel, normal basic panel, normal lipase.  CT scan is pending   1350 CT abdomen and pelvis independently interpreted by me does not demonstrate any acute intra-abdominal findings including obstruction, perforation, or inflammatory changes   1454 Patient seen and examined, he reports generalized abdominal pain, morning nausea and vomiting with difficulty eating during the day but then able to eat in the evenings.  This been intermittent for several months but more consistent for the last 4 to 5 days.  Denies blood in the stools or hematemesis.  On exam he does have some mild left upper quadrant tenderness.  Labs with mild leukocytosis but no other evidence for acute inflammatory infectious etiology, CT scan is normal.  Symptoms may be related to gastritis, also consider delayed gastric emptying or early ulcer patient is already on PPI, add Carafate and Reglan for nausea and prokinetic effects         At the conclusion of the encounter I discussed the results of all of the tests and the disposition. The  questions were answered. The patient or family acknowledged understanding and was agreeable with the care plan.     Medical Decision Making  Obtained supplemental history:Supplemental history obtained?: Family Member/Significant Other  Reviewed external records: External records reviewed?: Documented in chart  Care impacted by chronic illness:Hypertension  Care significantly affected by social determinants of health:Access to Affordable Health Care  Did you consider but not order tests?: Work up considered but not performed and documented in chart, if applicable  Did you interpret images independently?: Independent interpretation of ECG and images noted in documentation, when applicable.  Consultation discussion with other provider:Did you involve another provider (consultant, , pharmacy, etc.)?: No  Discharge. I prescribed additional prescription strength medication(s) as charted. N/A.        MEDICATIONS GIVEN IN THE EMERGENCY:  Medications   sodium chloride 0.9% BOLUS 1,000 mL (1,000 mLs Intravenous $New Bag 4/4/24 1233)   ondansetron (ZOFRAN) injection 4 mg (4 mg Intravenous $Given 4/4/24 1236)   iopamidol (ISOVUE-370) solution 90 mL (90 mLs Intravenous $Given 4/4/24 1337)       NEW PRESCRIPTIONS STARTED AT TODAY'S ER VISIT  New Prescriptions    METOCLOPRAMIDE (REGLAN) 10 MG TABLET    Take 1 tablet (10 mg) by mouth 4 times daily (before meals and nightly)    SUCRALFATE (CARAFATE) 1 GM TABLET    Take 1 tablet (1 g) by mouth 2 times daily       =================================================================    HPI    Patient information was obtained from: patient      Viet Beebe is a 39 year old male with a pertinent history of anxiety, irritable bowel syndrome, hypertension who presents to this ED abdominal pain, nausea and vomiting.  Patient notes morning nausea and vomiting  consistently for the last 4 days but intermittently for the last several months.  He attributes this to taking metformin which she  "stopped several months ago without improvement of symptoms.  Denies diarrhea but says it feels like his bowels are \"grating\" to pass stool.  He also notes that although he is able to eat in the evening, if he eats a lot in the evenings or later in the evenings, he has increased morning symptoms. Has not noted any early satiety.  No difficulty swallowing does have a history of what sounds like a duodenal mechanical obstruction from adhesions in the past.      REVIEW OF SYSTEMS   Review of Systems   All other systems reviewed and negative    PAST MEDICAL HISTORY:  Past Medical History:   Diagnosis Date    Anxiety and depression     Sees Psychiatrist, [place], [place]. Depression for many years    Depressive disorder     Hypertension 2012    IBS (irritable bowel syndrome)        PAST SURGICAL HISTORY:  Past Surgical History:   Procedure Laterality Date    APPENDECTOMY  2007       CURRENT MEDICATIONS:    No current facility-administered medications for this encounter.     Current Outpatient Medications   Medication Sig Dispense Refill    metoclopramide (REGLAN) 10 MG tablet Take 1 tablet (10 mg) by mouth 4 times daily (before meals and nightly) 30 tablet 0    sucralfate (CARAFATE) 1 GM tablet Take 1 tablet (1 g) by mouth 2 times daily 14 tablet 0    cholecalciferol (VITAMIN D3) 25 mcg (1000 units) capsule Take 1 capsule (25 mcg) by mouth daily 90 capsule 1    DIPHENHYDRAMINE HCL PO Take 25 mg by mouth nightly as needed      lisinopril-hydrochlorothiazide (ZESTORETIC) 20-25 MG tablet Take 1 tablet by mouth daily 90 tablet 0    MELATONIN PO Take by mouth At Bedtime      metoprolol succinate ER (TOPROL XL) 25 MG 24 hr tablet Take 1 tablet (25 mg) by mouth daily 90 tablet 0    multivitamin w/minerals (THERA-VIT-M) tablet Take 1 tablet by mouth daily. 100 tablet 3    omeprazole (PRILOSEC) 20 MG DR capsule TAKE 1 CAPSULE 30 TO 60 MINUTES BEFORE A MEAL 90 capsule 0    ondansetron (ZOFRAN ODT) 8 MG ODT tab Take 1 tablet (8 mg) by " "mouth every 8 hours as needed for nausea 12 tablet 0    sertraline (ZOLOFT) 100 MG tablet Take 1 tablet (100 mg) by mouth daily 90 tablet 0    SUMAtriptan (IMITREX) 50 MG tablet TAKE 1 TABLET AT ONSET OF HEADACHE FOR MIGRAINE MAY REPEAT IN 2 HOURS. MAXIMUM 4 TABLETS IN 24 HOURS 9 tablet 0       ALLERGIES:  No Known Allergies    FAMILY HISTORY:  Family History   Problem Relation Age of Onset    Skin Cancer Mother     Cancer Maternal Grandmother     Heart Disease Maternal Grandfather         MI    Cerebrovascular Disease Maternal Grandfather 40    Alzheimer Disease Paternal Grandfather 70        Alzheimers       SOCIAL HISTORY:   Social History     Socioeconomic History    Marital status: Single    Number of children: 0    Years of education: 17   Occupational History    Occupation: Electrical Enginner     Employer: [employer]   Tobacco Use    Smoking status: Former     Types: Cigarettes     Quit date: 2010     Years since quittin.2     Passive exposure: Never    Smokeless tobacco: Never   Vaping Use    Vaping Use: Never used   Substance and Sexual Activity    Alcohol use: Yes     Comment: Drinks over rhe w/e. 5 drinks weekly    Drug use: Never     Comment: Marihuana every 3rd day.    Sexual activity: Not Currently     Partners: Female     Birth control/protection: Condom   Other Topics Concern    Parent/sibling w/ CABG, MI or angioplasty before 65F 55M? No       VITALS:  BP (!) 140/93   Pulse 72   Temp 97.4  F (36.3  C) (Temporal)   Resp 20   Ht 1.727 m (5' 8\")   Wt 86.2 kg (190 lb)   SpO2 92%   BMI 28.89 kg/m      PHYSICAL EXAM:  Physical Exam  Vitals and nursing note reviewed.   Constitutional:       Appearance: Normal appearance.   HENT:      Head: Normocephalic and atraumatic.      Right Ear: External ear normal.      Left Ear: External ear normal.      Nose: Nose normal.      Mouth/Throat:      Mouth: Mucous membranes are moist.   Eyes:      Extraocular Movements: Extraocular movements intact.      " Conjunctiva/sclera: Conjunctivae normal.      Pupils: Pupils are equal, round, and reactive to light.   Cardiovascular:      Rate and Rhythm: Normal rate and regular rhythm.   Pulmonary:      Effort: Pulmonary effort is normal.      Breath sounds: Normal breath sounds. No wheezing or rales.   Abdominal:      General: Abdomen is flat. There is no distension.      Palpations: Abdomen is soft.      Tenderness: There is abdominal tenderness (LUQ). There is no guarding.   Musculoskeletal:         General: Normal range of motion.      Cervical back: Normal range of motion and neck supple.      Right lower leg: No edema.      Left lower leg: No edema.   Lymphadenopathy:      Cervical: No cervical adenopathy.   Skin:     General: Skin is warm and dry.   Neurological:      General: No focal deficit present.      Mental Status: He is alert and oriented to person, place, and time. Mental status is at baseline.      Comments: No gross focal neurologic deficits   Psychiatric:         Mood and Affect: Mood normal.         Behavior: Behavior normal.         Thought Content: Thought content normal.          LAB:  All pertinent labs reviewed and interpreted.  Results for orders placed or performed during the hospital encounter of 04/04/24   CT Abdomen Pelvis w Contrast    Impression    IMPRESSION:     1.  No acute abnormality or specific cause of abdominal pain are identified.    2.  Healing fracture of the left posterolateral eighth rib.   Hepatic function panel   Result Value Ref Range    Protein Total 8.3 6.4 - 8.3 g/dL    Albumin 5.3 (H) 3.5 - 5.2 g/dL    Bilirubin Total 0.5 <=1.2 mg/dL    Alkaline Phosphatase 84 40 - 150 U/L    AST 25 0 - 45 U/L    ALT 22 0 - 70 U/L    Bilirubin Direct <0.20 0.00 - 0.30 mg/dL   Result Value Ref Range    Lipase 31 13 - 60 U/L   Basic metabolic panel   Result Value Ref Range    Sodium 135 135 - 145 mmol/L    Potassium 3.9 3.4 - 5.3 mmol/L    Chloride 95 (L) 98 - 107 mmol/L    Carbon Dioxide (CO2)  29 22 - 29 mmol/L    Anion Gap 11 7 - 15 mmol/L    Urea Nitrogen 18.7 6.0 - 20.0 mg/dL    Creatinine 0.89 0.67 - 1.17 mg/dL    GFR Estimate >90 >60 mL/min/1.73m2    Calcium 10.3 (H) 8.6 - 10.0 mg/dL    Glucose 133 (H) 70 - 99 mg/dL   CBC with platelets and differential   Result Value Ref Range    WBC Count 15.4 (H) 4.0 - 11.0 10e3/uL    RBC Count 5.92 (H) 4.40 - 5.90 10e6/uL    Hemoglobin 16.5 13.3 - 17.7 g/dL    Hematocrit 48.6 40.0 - 53.0 %    MCV 82 78 - 100 fL    MCH 27.9 26.5 - 33.0 pg    MCHC 34.0 31.5 - 36.5 g/dL    RDW 12.7 10.0 - 15.0 %    Platelet Count 308 150 - 450 10e3/uL    % Neutrophils 86 %    % Lymphocytes 9 %    % Monocytes 4 %    % Eosinophils 0 %    % Basophils 0 %    % Immature Granulocytes 1 %    NRBCs per 100 WBC 0 <1 /100    Absolute Neutrophils 13.3 (H) 1.6 - 8.3 10e3/uL    Absolute Lymphocytes 1.4 0.8 - 5.3 10e3/uL    Absolute Monocytes 0.5 0.0 - 1.3 10e3/uL    Absolute Eosinophils 0.1 0.0 - 0.7 10e3/uL    Absolute Basophils 0.1 0.0 - 0.2 10e3/uL    Absolute Immature Granulocytes 0.1 <=0.4 10e3/uL    Absolute NRBCs 0.0 10e3/uL   Extra Blue Top Tube   Result Value Ref Range    Hold Specimen JIC    Extra Red Top Tube   Result Value Ref Range    Hold Specimen JIC        RADIOLOGY:  Reviewed all pertinent imaging. Please see official radiology report.  CT Abdomen Pelvis w Contrast   Final Result   IMPRESSION:       1.  No acute abnormality or specific cause of abdominal pain are identified.      2.  Healing fracture of the left posterolateral eighth rib.          I, Corine Erickson , am serving as a scribe to document services personally performed by Dr. Olivo based on my observation and the provider's statements to me. I, Gary Olivo MD attest that Corine Erickson  is acting in a scribe capacity, has observed my performance of the services and has documented them in accordance with my direction.    Gary Olivo M.D.  Emergency Medicine  [facility]  [facility] EMERGENCY ROOM  [address] [facility] [address]  [phone]  Dept: [phone]       Gary Olivo MD  04/04/24 1509

## 2024-04-04 NOTE — TELEPHONE ENCOUNTER
"Patient's call warm transferred to writer via priority nurse line.    Per patient:  LUQ abdominal pain  2. Intense nausea- no dry heaving  3. Cold sweats and gets hot flashes  4. Fatigue  5. Small bowel movements yesterday and has not passed a full bowel movement  6. Symptoms began 3/31/24 while at a restaurant-thinks ate bad food  7. Sharp pain in LUQ; \"maybe\" radiates into back and not so much now  8. No appetite  9. Has not checked temperature   10. Abdominal pain is currently severe  11. Able to walk  12. Nothing makes pain better nor worse; maybe liquid antacid was helpful  13. Drank milk and ate yogurt this morning and vomited that up  14. Pain with passing bowel movement- feels nauseated; yesterday small bowel movements were painful to pass  15. No melena yesterday but has seen blood after bowel movement in the past; thinks this has happened since symptom onset on 3/31/24  16. No pain with urination  17. No chest pain nor difficulty breathing    Writer recommended patient be evaluated in Emergency Room now; please check insurance card to clarify coverage of emergency services/hospital services.    Patient verbalized understanding and in agreement with plan.    Patient stated his mother will take him to the Emergency Room.    DIEGO Hart, RN-[facility]      Reason for Disposition   SEVERE abdominal pain (e.g., excruciating)    Additional Information   Negative: Passed out (i.e., fainted, collapsed and was not responding)   Negative: Shock suspected (e.g., cold/pale/clammy skin, too weak to stand, low BP, rapid pulse)   Negative: Sounds like a life-threatening emergency to the triager   Negative: Followed an abdomen (stomach) injury   Negative: Chest pain   Negative: Pain is mainly in upper abdomen (if needed ask: 'is it mainly above the belly button?')   Negative: Abdomen bloating or swelling are main symptoms    Protocols used: Abdominal Pain - Male-A-[place]    "

## 2024-04-08 ENCOUNTER — PATIENT OUTREACH (OUTPATIENT)
Dept: CARE COORDINATION | Facility: CLINIC | Age: 39
End: 2024-04-08
Payer: COMMERCIAL

## 2024-04-08 NOTE — LETTER
Viet Beebe  [address] [place] [address]    Dear Viet Beebe,      I am a team member within the [facility] Care Resource Center with [facility]. I recently tried to reach you to ensure you were doing well following a recent visit within our health system. I also wanted to take this chance to introduce Clinic Care Coordination.     Below is a description of Clinic Care Coordination and how this team can further assist you:       The Clinic Care Coordination team is made up of a Registered Nurse, , Financial Resource Worker, and a Community Health Worker who understand and can help navigate the health care system. The goal of clinic care coordination is to help you manage your health, improve access to care, and achieve optimal health outcomes. They work alongside your provider to assist you in determining your health and social needs, obtain health care and community resources, and provide you with necessary information and education. Clinic Care Coordination can work with you through any barriers and develop a care plan that helps coordinate and strengthen the relationship between you and your care team.    If you wish to connect with the Clinic Care Coordination Team, please let your [facility] Primary Care Provider or Clinic Care Team know and they can place a referral. The Clinic Care Coordination team will then reach out by phone to further support you.    We are focused on providing you with the highest-quality healthcare experience possible.    Sincerely,   Your care team with [facility]

## 2024-04-08 NOTE — PROGRESS NOTES
[facility] Care Resource Center Contact  [facility]/Voicemail     Clinical Data: Care Coordination ED-sourced Outreach-     Outreach attempted x 2.  Left message on patient's voicemail, providing [facility]'s 24/7 scheduling and nurse triage phone number [phone] ([phone]) for questions/concerns and/or to schedule an appt with an [facility] provider.      Care Coordination introduction letter with explanation of Clinic Care Coordination services sent to patient via [employer]. Clinic Care Coordination services remain available via referral if needed.    Plan: [facility] will do no further outreaches at this time.       JACQUELINE Up  Connected Care Resource [place], [facility]    *Connected Care Resource Team does NOT follow patient ongoing. Referrals are identified based on internal discharge reports and the outreach is to ensure patient has an understanding of their discharge instructions.

## 2024-04-10 ENCOUNTER — TRANSFERRED RECORDS (OUTPATIENT)
Dept: HEALTH INFORMATION MANAGEMENT | Facility: CLINIC | Age: 39
End: 2024-04-10
Payer: COMMERCIAL

## 2024-04-10 LAB
ALT SERPL-CCNC: 17 IU/L (ref 0–44)
AST SERPL-CCNC: 21 IU/L (ref 0–40)
CREATININE (EXTERNAL): 0.77 MG/DL (ref 0.76–1.27)
GFR ESTIMATED (EXTERNAL): 117 ML/MIN/1.73
GLUCOSE (EXTERNAL): 87 MG/DL (ref 70–99)
POTASSIUM (EXTERNAL): 4.4 MMOL/L (ref 3.5–5.2)

## 2024-04-11 ENCOUNTER — TRANSFERRED RECORDS (OUTPATIENT)
Dept: HEALTH INFORMATION MANAGEMENT | Facility: CLINIC | Age: 39
End: 2024-04-11
Payer: COMMERCIAL

## 2024-04-15 ENCOUNTER — TRANSFERRED RECORDS (OUTPATIENT)
Dept: HEALTH INFORMATION MANAGEMENT | Facility: CLINIC | Age: 39
End: 2024-04-15
Payer: COMMERCIAL

## 2024-04-16 ENCOUNTER — TELEPHONE (OUTPATIENT)
Dept: MEDSURG UNIT | Facility: CLINIC | Age: 39
End: 2024-04-16
Payer: COMMERCIAL

## 2024-04-22 ENCOUNTER — TRANSFERRED RECORDS (OUTPATIENT)
Dept: HEALTH INFORMATION MANAGEMENT | Facility: CLINIC | Age: 39
End: 2024-04-22
Payer: COMMERCIAL

## 2024-04-30 ENCOUNTER — HOSPITAL ENCOUNTER (OUTPATIENT)
Dept: MRI IMAGING | Facility: CLINIC | Age: 39
Discharge: HOME | End: 2024-04-30
Attending: INTERNAL MEDICINE
Payer: COMMERCIAL

## 2024-04-30 ENCOUNTER — HOSPITAL ENCOUNTER (OUTPATIENT)
Facility: CLINIC | Age: 39
Discharge: HOME | End: 2024-04-30
Admitting: RADIOLOGY
Payer: COMMERCIAL

## 2024-04-30 DIAGNOSIS — K62.5 RECTAL BLEEDING: ICD-10-CM

## 2024-04-30 DIAGNOSIS — K59.00 CONSTIPATION, UNSPECIFIED CONSTIPATION TYPE: ICD-10-CM

## 2024-04-30 DIAGNOSIS — R10.33 PERIUMBILICAL ABDOMINAL PAIN: ICD-10-CM

## 2024-04-30 PROCEDURE — A9585 GADOBUTROL INJECTION: HCPCS | Performed by: INTERNAL MEDICINE

## 2024-04-30 PROCEDURE — 74183 MRI ABD W/O CNTR FLWD CNTR: CPT

## 2024-04-30 PROCEDURE — 250N000013 HC RX MED GY IP 250 OP 250 PS 637: Performed by: INTERNAL MEDICINE

## 2024-04-30 PROCEDURE — 255N000002 HC RX 255 OP 636: Performed by: INTERNAL MEDICINE

## 2024-04-30 RX ORDER — GADOBUTROL 604.72 MG/ML
8 INJECTION INTRAVENOUS ONCE
Status: COMPLETED | OUTPATIENT
Start: 2024-04-30 | End: 2024-04-30

## 2024-04-30 RX ADMIN — GADOBUTROL 8 ML: 604.72 INJECTION INTRAVENOUS at 15:53

## 2024-04-30 RX ADMIN — HYOSCYAMINE SULFATE 125 MCG: 0.12 TABLET SUBLINGUAL at 15:54

## 2024-04-30 ASSESSMENT — ACTIVITIES OF DAILY LIVING (ADL)
ADLS_ACUITY_SCORE: 33

## 2024-05-02 ENCOUNTER — TRANSFERRED RECORDS (OUTPATIENT)
Dept: HEALTH INFORMATION MANAGEMENT | Facility: CLINIC | Age: 39
End: 2024-05-02
Payer: COMMERCIAL

## 2024-05-06 ENCOUNTER — TRANSFERRED RECORDS (OUTPATIENT)
Dept: HEALTH INFORMATION MANAGEMENT | Facility: CLINIC | Age: 39
End: 2024-05-06
Payer: COMMERCIAL

## 2024-06-02 SDOH — HEALTH STABILITY: PHYSICAL HEALTH: ON AVERAGE, HOW MANY DAYS PER WEEK DO YOU ENGAGE IN MODERATE TO STRENUOUS EXERCISE (LIKE A BRISK WALK)?: 4 DAYS

## 2024-06-02 SDOH — HEALTH STABILITY: PHYSICAL HEALTH: ON AVERAGE, HOW MANY MINUTES DO YOU ENGAGE IN EXERCISE AT THIS LEVEL?: 40 MIN

## 2024-06-02 ASSESSMENT — SOCIAL DETERMINANTS OF HEALTH (SDOH): HOW OFTEN DO YOU GET TOGETHER WITH FRIENDS OR RELATIVES?: ONCE A WEEK

## 2024-06-06 ENCOUNTER — TRANSFERRED RECORDS (OUTPATIENT)
Dept: HEALTH INFORMATION MANAGEMENT | Facility: CLINIC | Age: 39
End: 2024-06-06
Payer: COMMERCIAL

## 2024-06-06 ASSESSMENT — ANXIETY QUESTIONNAIRES
4. TROUBLE RELAXING: SEVERAL DAYS
GAD7 TOTAL SCORE: 7
6. BECOMING EASILY ANNOYED OR IRRITABLE: SEVERAL DAYS
7. FEELING AFRAID AS IF SOMETHING AWFUL MIGHT HAPPEN: SEVERAL DAYS
8. IF YOU CHECKED OFF ANY PROBLEMS, HOW DIFFICULT HAVE THESE MADE IT FOR YOU TO DO YOUR WORK, TAKE CARE OF THINGS AT HOME, OR GET ALONG WITH OTHER PEOPLE?: SOMEWHAT DIFFICULT
GAD7 TOTAL SCORE: 7
GAD7 TOTAL SCORE: 7
5. BEING SO RESTLESS THAT IT IS HARD TO SIT STILL: SEVERAL DAYS
1. FEELING NERVOUS, ANXIOUS, OR ON EDGE: SEVERAL DAYS
2. NOT BEING ABLE TO STOP OR CONTROL WORRYING: SEVERAL DAYS
IF YOU CHECKED OFF ANY PROBLEMS ON THIS QUESTIONNAIRE, HOW DIFFICULT HAVE THESE PROBLEMS MADE IT FOR YOU TO DO YOUR WORK, TAKE CARE OF THINGS AT HOME, OR GET ALONG WITH OTHER PEOPLE: SOMEWHAT DIFFICULT
3. WORRYING TOO MUCH ABOUT DIFFERENT THINGS: SEVERAL DAYS
7. FEELING AFRAID AS IF SOMETHING AWFUL MIGHT HAPPEN: SEVERAL DAYS

## 2024-06-06 ASSESSMENT — PATIENT HEALTH QUESTIONNAIRE - PHQ9
SUM OF ALL RESPONSES TO PHQ QUESTIONS 1-9: 9
SUM OF ALL RESPONSES TO PHQ QUESTIONS 1-9: 9
10. IF YOU CHECKED OFF ANY PROBLEMS, HOW DIFFICULT HAVE THESE PROBLEMS MADE IT FOR YOU TO DO YOUR WORK, TAKE CARE OF THINGS AT HOME, OR GET ALONG WITH OTHER PEOPLE: SOMEWHAT DIFFICULT

## 2024-06-07 ENCOUNTER — OFFICE VISIT (OUTPATIENT)
Dept: FAMILY MEDICINE | Facility: CLINIC | Age: 39
End: 2024-06-07
Payer: COMMERCIAL

## 2024-06-07 VITALS
HEART RATE: 86 BPM | TEMPERATURE: 97.7 F | DIASTOLIC BLOOD PRESSURE: 74 MMHG | SYSTOLIC BLOOD PRESSURE: 107 MMHG | WEIGHT: 183 LBS | HEIGHT: 68 IN | OXYGEN SATURATION: 96 % | RESPIRATION RATE: 16 BRPM | BODY MASS INDEX: 27.74 KG/M2

## 2024-06-07 DIAGNOSIS — Z13.1 SCREENING FOR DIABETES MELLITUS: ICD-10-CM

## 2024-06-07 DIAGNOSIS — I10 HYPERTENSION GOAL BP (BLOOD PRESSURE) < 130/80: ICD-10-CM

## 2024-06-07 DIAGNOSIS — K21.9 GASTROESOPHAGEAL REFLUX DISEASE WITHOUT ESOPHAGITIS: ICD-10-CM

## 2024-06-07 DIAGNOSIS — F32.A ANXIETY AND DEPRESSION: ICD-10-CM

## 2024-06-07 DIAGNOSIS — E66.3 OVERWEIGHT (BMI 25.0-29.9): ICD-10-CM

## 2024-06-07 DIAGNOSIS — G43.109 MIGRAINE WITH AURA AND WITHOUT STATUS MIGRAINOSUS, NOT INTRACTABLE: ICD-10-CM

## 2024-06-07 DIAGNOSIS — Z00.01 ENCOUNTER FOR GENERAL ADULT MEDICAL EXAMINATION WITH ABNORMAL FINDINGS: Primary | ICD-10-CM

## 2024-06-07 DIAGNOSIS — F41.9 ANXIETY AND DEPRESSION: ICD-10-CM

## 2024-06-07 DIAGNOSIS — K59.01 SLOW TRANSIT CONSTIPATION: ICD-10-CM

## 2024-06-07 DIAGNOSIS — Z13.6 CARDIOVASCULAR SCREENING; LDL GOAL LESS THAN 130: ICD-10-CM

## 2024-06-07 LAB — HBA1C MFR BLD: 5.3 % (ref 0–5.6)

## 2024-06-07 PROCEDURE — 99395 PREV VISIT EST AGE 18-39: CPT | Performed by: FAMILY MEDICINE

## 2024-06-07 PROCEDURE — 80053 COMPREHEN METABOLIC PANEL: CPT | Performed by: FAMILY MEDICINE

## 2024-06-07 PROCEDURE — 99214 OFFICE O/P EST MOD 30 MIN: CPT | Mod: 25 | Performed by: FAMILY MEDICINE

## 2024-06-07 PROCEDURE — 83036 HEMOGLOBIN GLYCOSYLATED A1C: CPT | Performed by: FAMILY MEDICINE

## 2024-06-07 PROCEDURE — 80061 LIPID PANEL: CPT | Performed by: FAMILY MEDICINE

## 2024-06-07 PROCEDURE — 82043 UR ALBUMIN QUANTITATIVE: CPT | Performed by: FAMILY MEDICINE

## 2024-06-07 PROCEDURE — 36415 COLL VENOUS BLD VENIPUNCTURE: CPT | Performed by: FAMILY MEDICINE

## 2024-06-07 PROCEDURE — 82570 ASSAY OF URINE CREATININE: CPT | Performed by: FAMILY MEDICINE

## 2024-06-07 RX ORDER — LISINOPRIL AND HYDROCHLOROTHIAZIDE 20; 25 MG/1; MG/1
1 TABLET ORAL DAILY
Qty: 90 TABLET | Refills: 3 | Status: SHIPPED | OUTPATIENT
Start: 2024-06-07

## 2024-06-07 RX ORDER — SUMATRIPTAN 50 MG/1
TABLET, FILM COATED ORAL
Qty: 18 TABLET | Refills: 11 | Status: SHIPPED | OUTPATIENT
Start: 2024-06-07

## 2024-06-07 RX ORDER — METOPROLOL SUCCINATE 25 MG/1
25 TABLET, EXTENDED RELEASE ORAL DAILY
Qty: 90 TABLET | Refills: 0 | Status: CANCELLED | OUTPATIENT
Start: 2024-06-07

## 2024-06-07 RX ORDER — SERTRALINE HYDROCHLORIDE 100 MG/1
150 TABLET, FILM COATED ORAL DAILY
Qty: 135 TABLET | Refills: 3 | Status: SHIPPED | OUTPATIENT
Start: 2024-06-07

## 2024-06-07 ASSESSMENT — PAIN SCALES - GENERAL: PAINLEVEL: NO PAIN (0)

## 2024-06-07 NOTE — PATIENT INSTRUCTIONS
ASSESSMENT AND PLAN  1. Encounter for general adult medical examination with abnormal findings  Reviewed health maintenance items/immunizations.     2. Slow transit constipation  Working closely with Gi team, has had upper and lower endoscopies and capsule endoscopy without pathologic findings.  Being treated for slow motility.     Using miralax/mag citrate daily.     Would consider trial of senna 1-2 times daily as bowel stimulant.     Plan to stop metoprolol as this can contribute to slow transit considering blood pressue is well controlled.     3. Hypertension goal BP (blood pressure) < 130/80  As above. Will monitor home bps and if consistently above 130/80 after stopping metoprolol will let me know.       - COMPREHENSIVE METABOLIC PANEL; Future  - Albumin Random Urine Quantitative with Creat Ratio; Future  - lisinopril-hydrochlorothiazide (ZESTORETIC) 20-25 MG tablet; Take 1 tablet by mouth daily  Dispense: 90 tablet; Refill: 3    4. Migraine with aura and without status migrainosus, not intractable  Increase to 18 pills/fill.  Improved with diet/exercise changes so hopefully stopping metoprolol (since also migraine preventive med) will not cause worse migraines.     - SUMAtriptan (IMITREX) 50 MG tablet; TAKE 1 TABLET AT ONSET OF HEADACHE FOR MIGRAINE MAY REPEAT IN 2 HOURS. MAXIMUM 4 TABLETS IN 24 HOURS  Dispense: 18 tablet; Refill: 11    5. Anxiety and depression      4/11/2023     7:08 AM 4/24/2023    11:11 AM 6/6/2024    11:42 AM   PHQ   PHQ-9 Total Score 6 6 9   Q9: Thoughts of better off dead/self-harm past 2 weeks Not at all Not at all Not at all         5/17/2022     3:34 PM 4/11/2023     7:09 AM 6/6/2024    11:43 AM   MONIK-7 SCORE   Total Score 7 (mild anxiety) 6 (mild anxiety) 7 (mild anxiety)   Total Score 7 6 7       Not fully controlled.  Desires trial of increased sertraline to 150mg/day which is reasonable.   - sertraline (ZOLOFT) 100 MG tablet; Take 1.5 tablets (150 mg) by mouth daily  Dispense:  "135 tablet; Refill: 3    6. Gastroesophageal reflux disease without esophagitis    - omeprazole (PRILOSEC) 20 MG DR capsule; TAKE 1 CAPSULE 30 TO 60 MINUTES BEFORE A MEAL  Dispense: 90 capsule; Refill: 0    7. Screening for diabetes mellitus    - Hemoglobin A1c; Future    8. Overweight (BMI 25.0-29.9)    - Hemoglobin A1c; Future    9. CARDIOVASCULAR SCREENING; LDL GOAL LESS THAN 130    - Lipid panel reflex to direct LDL Non-fasting; Future          Preventive Care Advice   This is general advice we often give to help people stay healthy. Your care team may have specific advice just for you. Please talk to your care team about your own preventive care needs.  Lifestyle  Exercise at least 150 minutes each week (30 minutes a day, 5 days a week).  Do muscle strengthening activities 2 days a week. These help control your weight and prevent disease.  No smoking.  Wear sunscreen to prevent skin cancer.  Have your home tested for radon every 2 to 5 years. Radon is a colorless, odorless gas that can harm your lungs. To learn more, go to www.health.[facility].mn. and search for \"Radon in Homes.\"  Keep guns unloaded and locked up in a safe place like a safe or gun vault, or, use a gun lock and hide the keys. Always lock away bullets separately. To learn more, visit [employer].mn.gov and search for \"safe gun storage.\"  Nutrition  Eat 5 or more servings of fruits and vegetables each day.  Try wheat bread, brown rice and whole grain pasta (instead of white bread, rice, and pasta).  Get enough calcium and vitamin D. Check the label on foods and aim for 100% of the RDA (recommended daily allowance).  Regular exams  Have a dental exam and cleaning every 6 months.  See your health care team every year to talk about:  Any changes in your health.  Any medicines your care team has prescribed.  Preventive care, family planning, and ways to prevent chronic diseases.  Shots (vaccines)   HPV shots (up to age 26), if you've never had them " before.  Hepatitis B shots (up to age 59), if you've never had them before.  COVID-19 shot: Get this shot when it's due.  Flu shot: Get a flu shot every year.  Tetanus shot: Get a tetanus shot every 10 years.  Pneumococcal, hepatitis A, and RSV shots: Ask your care team if you need these based on your risk.  Shingles shot (for age 50 and up).  General health tests  Diabetes screening:  Starting at age 35, Get screened for diabetes at least every 3 years.  If you are younger than age 35, ask your care team if you should be screened for diabetes.  Cholesterol test: At age 39, start having a cholesterol test every 5 years, or more often if advised.  Bone density scan (DEXA): At age 50, ask your care team if you should have this scan for osteoporosis (brittle bones).  Hepatitis C: Get tested at least once in your life.  Abdominal aortic aneurysm screening: Talk to your doctor about having this screening if you:  Have ever smoked; and  Are biologically male; and  Are between the ages of 65 and 75.  STIs (sexually transmitted infections)  Before age 24: Ask your care team if you should be screened for STIs.  After age 24: Get screened for STIs if you're at risk. You are at risk for STIs (including HIV) if:  You are sexually active with more than one person.  You don't use condoms every time.  You or a partner was diagnosed with a sexually transmitted infection.  If you are at risk for HIV, ask about PrEP medicine to prevent HIV.  Get tested for HIV at least once in your life, whether you are at risk for HIV or not.  Cancer screening tests  Cervical cancer screening: If you have a cervix, begin getting regular cervical cancer screening tests at age 21. Most people who have regular screenings with normal results can stop after age 65. Talk about this with your provider.  Breast cancer scan (mammogram): If you've ever had breasts, begin having regular mammograms starting at age 40. This is a scan to check for breast  cancer.  Colon cancer screening: It is important to start screening for colon cancer at age 45.  Have a colonoscopy test every 10 years (or more often if you're at risk) Or, ask your provider about stool tests like a FIT test every year or Cologuard test every 3 years.  To learn more about your testing options, visit: www.[employer]/974909.pdf.  For help making a decision, visit: christelle/ga09881.  Prostate cancer screening test: If you have a prostate and are age 55 to 69, ask your provider if you would benefit from a yearly prostate cancer screening test.  Lung cancer screening: If you are a current or former smoker age 50 to 80, ask your care team if ongoing lung cancer screenings are right for you.  For informational purposes only. Not to replace the advice of your health care provider. Copyright   2023 [employer]. All rights reserved. Clinically reviewed by the [facility]. Sqoot [id number] - REV 04/24.    Learning About Depression Screening  What is depression screening?  Depression screening is a way to see if you have depression symptoms. It may be done by a doctor or counselor. It's often part of a routine checkup. That's because your mental health is just as important as your physical health.  Depression is a mental health condition that affects how you feel, think, and act. You may:  Have less energy.  Lose interest in your daily activities.  Feel sad and grouchy for a long time.  Depression is very common. It affects people of all ages.  Many things can lead to depression. Some people become depressed after they have a stroke or find out they have a major illness like cancer or heart disease. The death of a loved one or a breakup may lead to depression. It can run in families. Most experts believe that a combination of inherited genes and stressful life events can cause it.  What happens during screening?  You may be asked to fill out a form about your depression  "symptoms. You and the doctor will discuss your answers. The doctor may ask you more questions to learn more about how you think, act, and feel.  What happens after screening?  If you have symptoms of depression, your doctor will talk to you about your options.  Doctors usually treat depression with medicines or counseling. Often, combining the two works best. Many people don't get help because they think that they'll get over the depression on their own. But people with depression may not get better unless they get treatment.  The cause of depression is not well understood. There may be many factors involved. But if you have depression, it's not your fault.  A serious symptom of depression is thinking about death or suicide. If you or someone you care about talks about this or about feeling hopeless, get help right away.  It's important to know that depression can be treated. Medicine, counseling, and self-care may help.  Where can you learn more?  Go to https://www.[employer] [facility].net/patiented  Enter T185 in the search box to learn more about \"Learning About Depression Screening.\"  Current as of: June 24, 2023               Content Version: 14.0    6814-3800 [employer].   Care instructions adapted under license by your healthcare professional. If you have questions about a medical condition or this instruction, always ask your healthcare professional. [employer] disclaims any warranty or liability for your use of this information.      "

## 2024-06-07 NOTE — PROGRESS NOTES
Preventive Care Visit  [facility]  Joel Daniel Wegener, MD, Family Medicine  Jun 7, 2024      Assessment & Plan     ASSESSMENT AND PLAN  1. Encounter for general adult medical examination with abnormal findings  Reviewed health maintenance items/immunizations.     2. Slow transit constipation  Working closely with Gi team, has had upper and lower endoscopies and capsule endoscopy without pathologic findings.  Being treated for slow motility.     Using miralax/mag citrate daily.     Would consider trial of senna 1-2 times daily as bowel stimulant.     Plan to stop metoprolol as this can contribute to slow transit considering blood pressue is well controlled.     3. Hypertension goal BP (blood pressure) < 130/80  As above. Will monitor home bps and if consistently above 130/80 after stopping metoprolol will let me know.       - COMPREHENSIVE METABOLIC PANEL; Future  - Albumin Random Urine Quantitative with Creat Ratio; Future  - lisinopril-hydrochlorothiazide (ZESTORETIC) 20-25 MG tablet; Take 1 tablet by mouth daily  Dispense: 90 tablet; Refill: 3    4. Migraine with aura and without status migrainosus, not intractable  Increase to 18 pills/fill.  Improved with diet/exercise changes so hopefully stopping metoprolol (since also migraine preventive med) will not cause worse migraines.     - SUMAtriptan (IMITREX) 50 MG tablet; TAKE 1 TABLET AT ONSET OF HEADACHE FOR MIGRAINE MAY REPEAT IN 2 HOURS. MAXIMUM 4 TABLETS IN 24 HOURS  Dispense: 18 tablet; Refill: 11    5. Anxiety and depression      4/11/2023     7:08 AM 4/24/2023    11:11 AM 6/6/2024    11:42 AM   PHQ   PHQ-9 Total Score 6 6 9   Q9: Thoughts of better off dead/self-harm past 2 weeks Not at all Not at all Not at all         5/17/2022     3:34 PM 4/11/2023     7:09 AM 6/6/2024    11:43 AM   MONIK-7 SCORE   Total Score 7 (mild anxiety) 6 (mild anxiety) 7 (mild anxiety)   Total Score 7 6 7       Not fully controlled.  Desires trial of increased  "sertraline to 150mg/day which is reasonable.   - sertraline (ZOLOFT) 100 MG tablet; Take 1.5 tablets (150 mg) by mouth daily  Dispense: 135 tablet; Refill: 3    6. Gastroesophageal reflux disease without esophagitis    - omeprazole (PRILOSEC) 20 MG DR capsule; TAKE 1 CAPSULE 30 TO 60 MINUTES BEFORE A MEAL  Dispense: 90 capsule; Refill: 0    7. Screening for diabetes mellitus    - Hemoglobin A1c; Future    8. Overweight (BMI 25.0-29.9)    - Hemoglobin A1c; Future    9. CARDIOVASCULAR SCREENING; LDL GOAL LESS THAN 130    - Lipid panel reflex to direct LDL Non-fasting; Future          Patient has been advised of split billing requirements and indicates understanding: Yes      32 minutes spent by me on the date of the encounter doing chart review, history and exam, documentation and further activities per the note    BMI  Estimated body mass index is 27.83 kg/m  as calculated from the following:    Height as of this encounter: 1.727 m (5' 8\").    Weight as of this encounter: 83 kg (183 lb).   Weight management plan: Discussed healthy diet and exercise guidelines    Counseling  Appropriate preventive services were discussed with this patient, including applicable screening as appropriate for fall prevention, nutrition, physical activity, Tobacco-use cessation, weight loss and cognition.  Checklist reviewing preventive services available has been given to the patient.  Reviewed patient's diet, addressing concerns and/or questions.   The patient's PHQ-9 score is consistent with mild depression. He was provided with information regarding depression.           Rasheed Llanos is a 39 year old, presenting for the following:  Physical        6/7/2024    11:05 AM   Additional Questions   Roomed by BRIANNA ZULETA        Health Care Directive  Patient does not have a Health Care Directive or Living Will: Discussed advance care planning with patient; information given to patient to review.    HPI              6/2/2024   General " Health   How would you rate your overall physical health? (!) FAIR   Feel stress (tense, anxious, or unable to sleep) To some extent   (!) STRESS CONCERN      2024   Nutrition   Three or more servings of calcium each day? Yes   Diet: Other   If other, please elaborate: Trying to figure out what foods cause darwin to slow down. Working with Dietitian.   How many servings of fruit and vegetables per day? (!) 2-3   How many sweetened beverages each day? 0-1         2024   Exercise   Days per week of moderate/strenous exercise 4 days   Average minutes spent exercising at this level 40 min         2024   Social Factors   Frequency of gathering with friends or relatives Once a week   Worry food won't last until get money to buy more No   Food not last or not have enough money for food? No   Do you have housing?  Yes   Are you worried about losing your housing? No   Lack of transportation? No   Unable to get utilities (heat,electricity)? No         2024   Dental   Dentist two times every year? Yes         2024   TB Screening   Were you born outside of the [place]? No       Today's PHQ-9 Score:       2024    11:42 AM   PHQ-9 SCORE   PHQ-9 Total Score MyChart 9 (Mild depression)   PHQ-9 Total Score 9         2024   Substance Use   Alcohol more than 3/day or more than 7/wk No   Do you use any other substances recreationally? No     Social History     Tobacco Use    Smoking status: Former     Current packs/day: 0.00     Types: Cigarettes     Quit date: 2010     Years since quittin.4     Passive exposure: Never    Smokeless tobacco: Never   Vaping Use    Vaping status: Never Used   Substance Use Topics    Alcohol use: Yes     Comment: Drinks over rhe w/e. 5 drinks weekly    Drug use: Never     Comment: Marihuana every 3rd day.           2024   STI Screening   New sexual partner(s) since last STI/HIV test? No         2024   Contraception/Family Planning   Questions about contraception or  "family planning No        Reviewed and updated as needed this visit by Provider                    Past Medical History:   Diagnosis Date    Anxiety and depression     Sees Psychiatrist, [place]. Depression for many years    Depressive disorder     Hypertension 2012    IBS (irritable bowel syndrome)      Past Surgical History:   Procedure Laterality Date    APPENDECTOMY  2007         Review of Systems  Constitutional, neuro, ENT, endocrine, pulmonary, cardiac, gastrointestinal, genitourinary, musculoskeletal, integument and psychiatric systems are negative, except as otherwise noted.     Objective    Exam  /74   Pulse 86   Temp 97.7  F (36.5  C) (Tympanic)   Resp 16   Ht 1.727 m (5' 8\")   Wt 83 kg (183 lb)   SpO2 96%   BMI 27.83 kg/m     Estimated body mass index is 27.83 kg/m  as calculated from the following:    Height as of this encounter: 1.727 m (5' 8\").    Weight as of this encounter: 83 kg (183 lb).    Physical Exam  GENERAL: alert and no distress  EYES: Eyes grossly normal to inspection, PERRL and conjunctivae and sclerae normal  HENT: ear canals and TM's normal, nose and mouth without ulcers or lesions  NECK: no adenopathy, no asymmetry, masses, or scars  RESP: lungs clear to auscultation - no rales, rhonchi or wheezes  CV: regular rate and rhythm, normal S1 S2, no S3 or S4, no murmur, click or rub, no peripheral edema  ABDOMEN: soft, nontender, no hepatosplenomegaly, no masses and bowel sounds normal  MS: no gross musculoskeletal defects noted, no edema  SKIN: no suspicious lesions or rashes  NEURO: Normal strength and tone, mentation intact and speech normal  PSYCH: mentation appears normal, affect normal/bright        Signed Electronically by: Joel Daniel Wegener, MD    "

## 2024-06-08 LAB
ALBUMIN SERPL BCG-MCNC: 5.1 G/DL (ref 3.5–5.2)
ALP SERPL-CCNC: 66 U/L (ref 40–150)
ALT SERPL W P-5'-P-CCNC: 28 U/L (ref 0–70)
ANION GAP SERPL CALCULATED.3IONS-SCNC: 12 MMOL/L (ref 7–15)
AST SERPL W P-5'-P-CCNC: 27 U/L (ref 0–45)
BILIRUB SERPL-MCNC: 0.5 MG/DL
BUN SERPL-MCNC: 17.1 MG/DL (ref 6–20)
CALCIUM SERPL-MCNC: 10 MG/DL (ref 8.6–10)
CHLORIDE SERPL-SCNC: 97 MMOL/L (ref 98–107)
CHOLEST SERPL-MCNC: 211 MG/DL
CREAT SERPL-MCNC: 0.92 MG/DL (ref 0.67–1.17)
CREAT UR-MCNC: 278 MG/DL
DEPRECATED HCO3 PLAS-SCNC: 25 MMOL/L (ref 22–29)
EGFRCR SERPLBLD CKD-EPI 2021: >90 ML/MIN/1.73M2
FASTING STATUS PATIENT QL REPORTED: YES
FASTING STATUS PATIENT QL REPORTED: YES
GLUCOSE SERPL-MCNC: 96 MG/DL (ref 70–99)
HDLC SERPL-MCNC: 41 MG/DL
LDLC SERPL CALC-MCNC: 150 MG/DL
MICROALBUMIN UR-MCNC: <12 MG/L
MICROALBUMIN/CREAT UR: NORMAL MG/G{CREAT}
NONHDLC SERPL-MCNC: 170 MG/DL
POTASSIUM SERPL-SCNC: 4.2 MMOL/L (ref 3.4–5.3)
PROT SERPL-MCNC: 7.7 G/DL (ref 6.4–8.3)
SODIUM SERPL-SCNC: 134 MMOL/L (ref 135–145)
TRIGL SERPL-MCNC: 101 MG/DL

## 2024-10-14 DIAGNOSIS — K21.9 GASTROESOPHAGEAL REFLUX DISEASE WITHOUT ESOPHAGITIS: ICD-10-CM

## 2024-11-29 ENCOUNTER — TRANSFERRED RECORDS (OUTPATIENT)
Dept: HEALTH INFORMATION MANAGEMENT | Facility: CLINIC | Age: 39
End: 2024-11-29
Payer: COMMERCIAL

## 2024-12-19 ENCOUNTER — TRANSFERRED RECORDS (OUTPATIENT)
Dept: HEALTH INFORMATION MANAGEMENT | Facility: CLINIC | Age: 39
End: 2024-12-19
Payer: COMMERCIAL

## 2024-12-19 ENCOUNTER — HOSPITAL ENCOUNTER (OUTPATIENT)
Dept: RADIOLOGY | Facility: CLINIC | Age: 39
Discharge: HOME | End: 2024-12-19
Attending: NURSE PRACTITIONER
Payer: COMMERCIAL

## 2024-12-19 DIAGNOSIS — K21.9 GASTROESOPHAGEAL REFLUX DISEASE, UNSPECIFIED WHETHER ESOPHAGITIS PRESENT: ICD-10-CM

## 2024-12-19 DIAGNOSIS — R10.10 UPPER ABDOMINAL PAIN: ICD-10-CM

## 2024-12-19 DIAGNOSIS — R11.0 NAUSEA WITHOUT VOMITING: ICD-10-CM

## 2024-12-19 DIAGNOSIS — K59.09 CHRONIC CONSTIPATION: ICD-10-CM

## 2024-12-19 PROCEDURE — 74240 X-RAY XM UPR GI TRC 1CNTRST: CPT

## 2025-02-04 ENCOUNTER — MYC MEDICAL ADVICE (OUTPATIENT)
Dept: FAMILY MEDICINE | Facility: CLINIC | Age: 40
End: 2025-02-04
Payer: COMMERCIAL

## 2025-02-04 DIAGNOSIS — R63.1 EXCESSIVE THIRST: ICD-10-CM

## 2025-02-04 DIAGNOSIS — F33.1 MODERATE EPISODE OF RECURRENT MAJOR DEPRESSIVE DISORDER (H): Primary | ICD-10-CM

## 2025-02-04 DIAGNOSIS — Z13.1 SCREENING FOR DIABETES MELLITUS: ICD-10-CM

## 2025-02-04 NOTE — TELEPHONE ENCOUNTER
JW,  Please see below [employer] message and advise.  Last OV 06/07/2024  Could advise visit to discuss need?  Pended if approved  Thanks,  Gloria SHARP RN

## 2025-02-11 ASSESSMENT — PATIENT HEALTH QUESTIONNAIRE - PHQ9
10. IF YOU CHECKED OFF ANY PROBLEMS, HOW DIFFICULT HAVE THESE PROBLEMS MADE IT FOR YOU TO DO YOUR WORK, TAKE CARE OF THINGS AT HOME, OR GET ALONG WITH OTHER PEOPLE: SOMEWHAT DIFFICULT
SUM OF ALL RESPONSES TO PHQ QUESTIONS 1-9: 10
SUM OF ALL RESPONSES TO PHQ QUESTIONS 1-9: 10

## 2025-02-12 ENCOUNTER — VIRTUAL VISIT (OUTPATIENT)
Dept: BEHAVIORAL HEALTH | Facility: CLINIC | Age: 40
End: 2025-02-12
Attending: FAMILY MEDICINE
Payer: COMMERCIAL

## 2025-02-12 DIAGNOSIS — F41.1 GENERALIZED ANXIETY DISORDER: ICD-10-CM

## 2025-02-12 DIAGNOSIS — F33.1 MODERATE EPISODE OF RECURRENT MAJOR DEPRESSIVE DISORDER (H): Primary | ICD-10-CM

## 2025-02-12 PROCEDURE — 90834 PSYTX W PT 45 MINUTES: CPT | Mod: 95

## 2025-02-12 ASSESSMENT — ANXIETY QUESTIONNAIRES
7. FEELING AFRAID AS IF SOMETHING AWFUL MIGHT HAPPEN: MORE THAN HALF THE DAYS
GAD7 TOTAL SCORE: 18
1. FEELING NERVOUS, ANXIOUS, OR ON EDGE: NEARLY EVERY DAY
6. BECOMING EASILY ANNOYED OR IRRITABLE: MORE THAN HALF THE DAYS
2. NOT BEING ABLE TO STOP OR CONTROL WORRYING: NEARLY EVERY DAY
GAD7 TOTAL SCORE: 18
3. WORRYING TOO MUCH ABOUT DIFFERENT THINGS: NEARLY EVERY DAY
5. BEING SO RESTLESS THAT IT IS HARD TO SIT STILL: MORE THAN HALF THE DAYS

## 2025-02-12 ASSESSMENT — COLUMBIA-SUICIDE SEVERITY RATING SCALE - C-SSRS
REASONS FOR IDEATION PAST MONTH: COMPLETELY TO GET ATTENTION, REVENGE, OR A REACTION FROM OTHERS
REASONS FOR IDEATION LIFETIME: COMPLETELY TO END OR STOP THE PAIN (YOU COULDN'T GO ON LIVING WITH THE PAIN OR HOW YOU WERE FEELING)
1. HAVE YOU WISHED YOU WERE DEAD OR WISHED YOU COULD GO TO SLEEP AND NOT WAKE UP?: YES
2. HAVE YOU ACTUALLY HAD ANY THOUGHTS OF KILLING YOURSELF?: NO
1. IN THE PAST MONTH, HAVE YOU WISHED YOU WERE DEAD OR WISHED YOU COULD GO TO SLEEP AND NOT WAKE UP?: NO
TOTAL  NUMBER OF INTERRUPTED ATTEMPTS LIFETIME: NO
TOTAL  NUMBER OF ABORTED OR SELF INTERRUPTED ATTEMPTS LIFETIME: NO
6. HAVE YOU EVER DONE ANYTHING, STARTED TO DO ANYTHING, OR PREPARED TO DO ANYTHING TO END YOUR LIFE?: NO
ATTEMPT LIFETIME: NO

## 2025-02-12 ASSESSMENT — PATIENT HEALTH QUESTIONNAIRE - PHQ9: 5. POOR APPETITE OR OVEREATING: NEARLY EVERY DAY

## 2025-02-12 NOTE — PROGRESS NOTES
[facility]   Mental Health & Addiction Services     Progress Note - Initial Visit    Patient  Name:  Viet Beebe Date: 25           Service Type: Individual     Visit Start Time: 10:03 am  Visit End Time: 10:47 am    Visit #: 1    Attendees: Client attended alone    Service Modality:  Video Visit:      Provider verified identity through the following two step process.  Patient provided:  Patient photo and Patient     Telemedicine Visit: The patient's condition can be safely assessed and treated via synchronous audio and visual telemedicine encounter.      Reason for Telemedicine Visit: Services only offered telehealth    Originating Site (Patient Location): Patient's home    Distant Site (Provider Location): Provider Remote Setting- Home Office    Consent:  The patient/guardian has verbally consented to: the potential risks and benefits of telemedicine (video visit) versus in person care; bill my insurance or make self-payment for services provided; and responsibility for payment of non-covered services.     Patient would like the video invitation sent by:  Send to e-mail at: [email or web address]    Mode of Communication:  Video Conference via [facility]    Distant Location (Provider):  Off-site    As the provider I attest to compliance with applicable laws and regulations related to telemedicine.       DATA:   Interactive Complexity: No   Crisis: No     Presenting Concerns/  Current Stressors:   Patient provided consent for therapy and treatment.  Limits of confidentiality, private policies and clinic policies were discussed including cancellation policies.  Patient has had abdominal pain and other medical issues the last few years.  Patient has coped with using alcohol for all this life.  Since his medical issues started, he has been sober and has noticed his memories and emotions are back.  Feelings have been overwhelming.  Patient reported that he has been ruminating about a past friend  who , about the past relationships that have not been healthy.  Patient would like to learn how to take care of himself, and process some of his past.      ASSESSMENT:  Mental Status Assessment:  Appearance:   Appropriate   Eye Contact:   Fair   Psychomotor Behavior: Restless   Attitude:   Cooperative  Interested James  Orientation:   All  Speech   Rate / Production: Normal/ Responsive   Volume:  Normal   Mood:    Anxious  Depressed  Sad   Affect:    Worrisome   Thought Content:  Rumination   Thought Form:  Coherent   Insight:    Fair     Assessments completed prior to this visit:  The following assessments were completed by patient for this visit:  PHQ9:       2022     1:46 PM 10/24/2022     9:01 AM 2023     7:08 AM 2023    11:11 AM 2024    11:42 AM 2025    11:51 AM 2025    10:28 AM   PHQ-9 SCORE   PHQ-9 Total Score MyChart 4 (Minimal depression) 3 (Minimal depression) 6 (Mild depression) 6 (Mild depression) 9 (Mild depression) 10 (Moderate depression)    PHQ-9 Total Score 4 3 6 6 9 10  13       Patient-reported     GAD7:       2020     3:00 PM 2021    12:11 PM 3/28/2022     9:22 AM 2022     3:34 PM 2023     7:09 AM 2024    11:43 AM 2025    10:28 AM   MONIK-7 SCORE   Total Score   6 (mild anxiety) 7 (mild anxiety) 6 (mild anxiety) 7 (mild anxiety)    Total Score 3 6 6 7 6 7 18     PROMIS 10-Global Health (all questions and answers displayed):       2025    12:38 PM 2025    10:23 AM   PROMIS 10   In general, would you say your health is: Fair    In general, would you say your quality of life is: Good    In general, how would you rate your physical health? Good    In general, how would you rate your mental health, including your mood and your ability to think? Poor    In general, how would you rate your satisfaction with your social activities and relationships? Fair    In general, please rate how well you carry out your usual social activities and  roles Very good    To what extent are you able to carry out your everyday physical activities such as walking, climbing stairs, carrying groceries, or moving a chair? Completely    In the past 7 days, how often have you been bothered by emotional problems such as feeling anxious, depressed, or irritable? Often    In the past 7 days, how would you rate your fatigue on average? Moderate    In the past 7 days, how would you rate your pain on average, where 0 means no pain, and 10 means worst imaginable pain? 6    In general, would you say your health is: 2 3   In general, would you say your quality of life is: 3 3   In general, how would you rate your physical health? 3 3   In general, how would you rate your mental health, including your mood and your ability to think? 1 3   In general, how would you rate your satisfaction with your social activities and relationships? 2 4   In general, please rate how well you carry out your usual social activities and roles. (This includes activities at home, at work and in your community, and responsibilities as a parent, child, spouse, employee, friend, etc.) 4 3   To what extent are you able to carry out your everyday physical activities such as walking, climbing stairs, carrying groceries, or moving a chair? 5 4   In the past 7 days, how often have you been bothered by emotional problems such as feeling anxious, depressed, or irritable? 4 4   In the past 7 days, how would you rate your fatigue on average? 3 4   In the past 7 days, how would you rate your pain on average, where 0 means no pain, and 10 means worst imaginable pain? 6 4   Global Mental Health Score 8  12   Global Physical Health Score 14  12   PROMIS TOTAL - SUBSCORES 22  24       Patient-reported     [place] Suicide Severity Rating Scale (Lifetime/Recent)      4/4/2024    12:10 PM 2/12/2025    10:17 AM   [place] Suicide Severity Rating (Lifetime/Recent)   Q1 Wished to be Dead (Past Month) 0-->no    Q2 Suicidal  "Thoughts (Past Month) 0-->no    Q6 Suicide Behavior (Lifetime) 0-->no    Level of Risk per Screen no risks indicated    1. Wish to be Dead (Lifetime)  Y   Wish to be Dead Description (Lifetime)  \"I do not feel worthy\" \" I do not feel that I deserve to be here.\"   1. Wish to be Dead (Past 1 Month)  N   2. Non-Specific Active Suicidal Thoughts (Lifetime)  N   Most Severe Ideation Rating (Lifetime)  3   Description of Most Severe Ideation (Lifetime)  \"I am going to be in pain forever.\"   Most Severe Ideation Rating (Past 1 Month)  1   Frequency (Lifetime)  2   Frequency (Past 1 Month)  1   Duration (Lifetime)  1   Duration (Past 1 Month)  1   Controllability (Lifetime)  1   Controllability (Past 1 Month)  1   Deterrents (Lifetime)  1   Deterrents (Past 1 Month)  1   Reasons for Ideation (Lifetime)  5   Reasons for Ideation (Past 1 Month)  1   Actual Attempt (Lifetime)  N   Has subject engaged in non-suicidal self-injurious behavior? (Lifetime)  N   Interrupted Attempts (Lifetime)  N   Aborted or Self-Interrupted Attempt (Lifetime)  N   Preparatory Acts or Behavior (Lifetime)  N   Calculated C-SSRS Risk Score (Lifetime/Recent)  No Risk Indicated         Safety Issues and Plan for Safety and Risk Management:   [place] Suicide Severity Rating Scale (Lifetime/Recent)      4/4/2024    12:10 PM 2/12/2025    10:17 AM   [place] Suicide Severity Rating (Lifetime/Recent)   Q1 Wished to be Dead (Past Month) 0-->no    Q2 Suicidal Thoughts (Past Month) 0-->no    Q6 Suicide Behavior (Lifetime) 0-->no    Level of Risk per Screen no risks indicated    1. Wish to be Dead (Lifetime)  Y   Wish to be Dead Description (Lifetime)  \"I do not feel worthy\" \" I do not feel that I deserve to be here.\"   1. Wish to be Dead (Past 1 Month)  N   2. Non-Specific Active Suicidal Thoughts (Lifetime)  N   Most Severe Ideation Rating (Lifetime)  3   Description of Most Severe Ideation (Lifetime)  \"I am going to be in pain forever.\"   Most Severe " Ideation Rating (Past 1 Month)  1   Frequency (Lifetime)  2   Frequency (Past 1 Month)  1   Duration (Lifetime)  1   Duration (Past 1 Month)  1   Controllability (Lifetime)  1   Controllability (Past 1 Month)  1   Deterrents (Lifetime)  1   Deterrents (Past 1 Month)  1   Reasons for Ideation (Lifetime)  5   Reasons for Ideation (Past 1 Month)  1   Actual Attempt (Lifetime)  N   Has subject engaged in non-suicidal self-injurious behavior? (Lifetime)  N   Interrupted Attempts (Lifetime)  N   Aborted or Self-Interrupted Attempt (Lifetime)  N   Preparatory Acts or Behavior (Lifetime)  N   Calculated C-SSRS Risk Score (Lifetime/Recent)  No Risk Indicated     Patient denies current fears or concerns for personal safety.  Patient denies current or recent suicidal ideation or behaviors.  Patient denies current or recent homicidal ideation or behaviors.  Patient denies current or recent self injurious behavior or ideation.  Patient denies other safety concerns.  Recommended that patient call 911 or go to the local ED should there be a change in any of these risk factors  Patient reports there are firearms in the house. The firearms are secured in a locked space.     Diagnostic Criteria:  Generalized Anxiety Disorder  A. Excessive anxiety and worry about a number of events or activities (such as work or school performance).   B. The person finds it difficult to control the worry.  C. Select 3 or more symptoms (required for diagnosis). Only one item is required in children.   - Restlessness or feeling keyed up or on edge.    - Being easily fatigued.    - Difficulty concentrating or mind going blank.    - Irritability.    - Sleep disturbance (difficulty falling or staying asleep, or restless unsatisfying sleep).   D. The focus of the anxiety and worry is not confined to features of an Axis I disorder.  E. The anxiety, worry, or physical symptoms cause clinically significant distress or impairment in social, occupational, or  other important areas of functioning.   F. The disturbance is not due to the direct physiological effects of a substance (e.g., a drug of abuse, a medication) or a general medical condition (e.g., hyperthyroidism) and does not occur exclusively during a Mood Disorder, a Psychotic Disorder, or a Pervasive Developmental Disorder.  Major Depressive Disorder  CRITERIA (A-C) REPRESENT A MAJOR DEPRESSIVE EPISODE - SELECT THESE CRITERIA  A) Recurrent episode(s) - symptoms have been present during the same 2-week period and represent a change from previous functioning 5 or more symptoms (required for diagnosis)   - Depressed mood. Note: In children and adolescents, can be irritable mood.     - Diminished interest or pleasure in all, or almost all, activities.    - Increased sleep.    - Fatigue or loss of energy.    - Feelings of worthlessness or excessive guilt.    - Diminished ability to think or concentrate, or indecisiveness.   B) The symptoms cause clinically significant distress or impairment in social, occupational, or other important areas of functioning  C) The episode is not attributable to the physiological effects of a substance or to another medical condition  D) The occurence of major depressive episode is not better explained by other thought / psychotic disorders  E) There has never been a manic episode or hypomanic episode      DSM5 Diagnoses: (Sustained by DSM5 Criteria Listed Above)  Diagnoses: 296.32 (F33.1) Major Depressive Disorder, Recurrent Episode, Moderate _  300.02 (F41.1) Generalized Anxiety Disorder  Psychosocial & Contextual Factors: Patient has been having gastrointentinal issues with lots of pain for about a year.  Since then, he has stopped drinking and has become depressed.  He acknowledged the present of excessive shame and quilt.    Intervention:   Educated on treatment planning and started identifying goals and interventions for treatment plan  Collateral Reports Completed:  Not  Applicable      PLAN: (Homework, other):  1. Provider will continue Diagnostic Assessment.  Patient was given the following to do until next session:  1) research healthy sleep routines and create one for self. 2) download the [place] up and look around to see what he could use.    2. Provider recommended the following referrals: NA.      3.  Suicide Risk and Safety Concerns were assessed for Viet Beebe.      4. Patient is no risk based on CSSRS    5.Safety Plan: No risk based of CSSRS      Manjinder BRUNO Caro  February 12, 2025

## 2025-02-13 ASSESSMENT — PATIENT HEALTH QUESTIONNAIRE - PHQ9: SUM OF ALL RESPONSES TO PHQ QUESTIONS 1-9: 13

## 2025-02-16 ENCOUNTER — TELEPHONE (OUTPATIENT)
Dept: BEHAVIORAL HEALTH | Facility: CLINIC | Age: 40
End: 2025-02-16

## 2025-02-16 ENCOUNTER — HOSPITAL ENCOUNTER (EMERGENCY)
Facility: CLINIC | Age: 40
Discharge: HOME | End: 2025-02-17
Attending: EMERGENCY MEDICINE | Admitting: EMERGENCY MEDICINE
Payer: COMMERCIAL

## 2025-02-16 DIAGNOSIS — F23 ACUTE PSYCHOSIS (H): ICD-10-CM

## 2025-02-16 PROBLEM — F29 PSYCHOSIS (H): Status: ACTIVE | Noted: 2025-02-16

## 2025-02-16 PROBLEM — F41.1 GENERALIZED ANXIETY DISORDER: Status: ACTIVE | Noted: 2025-02-16

## 2025-02-16 LAB
ALBUMIN UR-MCNC: NEGATIVE MG/DL
AMPHETAMINES UR QL SCN: ABNORMAL
ANION GAP SERPL CALCULATED.3IONS-SCNC: 14 MMOL/L (ref 7–15)
APPEARANCE UR: CLEAR
BARBITURATES UR QL SCN: ABNORMAL
BASOPHILS # BLD AUTO: 0 10E3/UL (ref 0–0.2)
BASOPHILS NFR BLD AUTO: 0 %
BENZODIAZ UR QL SCN: ABNORMAL
BILIRUB UR QL STRIP: NEGATIVE
BUN SERPL-MCNC: 12.8 MG/DL (ref 6–20)
BZE UR QL SCN: ABNORMAL
CALCIUM SERPL-MCNC: 9 MG/DL (ref 8.8–10.4)
CANNABINOIDS UR QL SCN: ABNORMAL
CHLORIDE SERPL-SCNC: 92 MMOL/L (ref 98–107)
COLOR UR AUTO: COLORLESS
CREAT SERPL-MCNC: 0.77 MG/DL (ref 0.67–1.17)
EGFRCR SERPLBLD CKD-EPI 2021: >90 ML/MIN/1.73M2
EOSINOPHIL # BLD AUTO: 0 10E3/UL (ref 0–0.7)
EOSINOPHIL NFR BLD AUTO: 0 %
ERYTHROCYTE [DISTWIDTH] IN BLOOD BY AUTOMATED COUNT: 13 % (ref 10–15)
FENTANYL UR QL: ABNORMAL
GLUCOSE SERPL-MCNC: 128 MG/DL (ref 70–99)
GLUCOSE UR STRIP-MCNC: NEGATIVE MG/DL
HCO3 SERPL-SCNC: 25 MMOL/L (ref 22–29)
HCT VFR BLD AUTO: 45 % (ref 40–53)
HGB BLD-MCNC: 15.4 G/DL (ref 13.3–17.7)
HGB UR QL STRIP: NEGATIVE
IMM GRANULOCYTES # BLD: 0 10E3/UL
IMM GRANULOCYTES NFR BLD: 0 %
KETONES UR STRIP-MCNC: NEGATIVE MG/DL
LEUKOCYTE ESTERASE UR QL STRIP: NEGATIVE
LYMPHOCYTES # BLD AUTO: 1.5 10E3/UL (ref 0.8–5.3)
LYMPHOCYTES NFR BLD AUTO: 12 %
MAGNESIUM SERPL-MCNC: 1.9 MG/DL (ref 1.7–2.3)
MCH RBC QN AUTO: 27.5 PG (ref 26.5–33)
MCHC RBC AUTO-ENTMCNC: 34.2 G/DL (ref 31.5–36.5)
MCV RBC AUTO: 80 FL (ref 78–100)
MONOCYTES # BLD AUTO: 0.5 10E3/UL (ref 0–1.3)
MONOCYTES NFR BLD AUTO: 4 %
NEUTROPHILS # BLD AUTO: 10.5 10E3/UL (ref 1.6–8.3)
NEUTROPHILS NFR BLD AUTO: 83 %
NITRATE UR QL: NEGATIVE
NRBC # BLD AUTO: 0 10E3/UL
NRBC BLD AUTO-RTO: 0 /100
OPIATES UR QL SCN: ABNORMAL
PCP QUAL URINE (ROCHE): ABNORMAL
PH UR STRIP: 7 [PH] (ref 5–7)
PLATELET # BLD AUTO: 264 10E3/UL (ref 150–450)
POTASSIUM SERPL-SCNC: 3.1 MMOL/L (ref 3.4–5.3)
RBC # BLD AUTO: 5.61 10E6/UL (ref 4.4–5.9)
RBC URINE: 0 /HPF
SARS-COV-2 RNA RESP QL NAA+PROBE: NEGATIVE
SODIUM SERPL-SCNC: 131 MMOL/L (ref 135–145)
SP GR UR STRIP: 1.01 (ref 1–1.03)
TSH SERPL DL<=0.005 MIU/L-ACNC: 1.18 UIU/ML (ref 0.3–4.2)
UROBILINOGEN UR STRIP-MCNC: <2 MG/DL
WBC # BLD AUTO: 12.6 10E3/UL (ref 4–11)
WBC URINE: <1 /HPF

## 2025-02-16 PROCEDURE — 80307 DRUG TEST PRSMV CHEM ANLYZR: CPT | Performed by: EMERGENCY MEDICINE

## 2025-02-16 PROCEDURE — 36415 COLL VENOUS BLD VENIPUNCTURE: CPT | Performed by: EMERGENCY MEDICINE

## 2025-02-16 PROCEDURE — 84295 ASSAY OF SERUM SODIUM: CPT | Performed by: EMERGENCY MEDICINE

## 2025-02-16 PROCEDURE — 87635 SARS-COV-2 COVID-19 AMP PRB: CPT | Performed by: EMERGENCY MEDICINE

## 2025-02-16 PROCEDURE — 85049 AUTOMATED PLATELET COUNT: CPT | Performed by: EMERGENCY MEDICINE

## 2025-02-16 PROCEDURE — 81001 URINALYSIS AUTO W/SCOPE: CPT | Performed by: EMERGENCY MEDICINE

## 2025-02-16 PROCEDURE — 83735 ASSAY OF MAGNESIUM: CPT | Performed by: EMERGENCY MEDICINE

## 2025-02-16 PROCEDURE — 84443 ASSAY THYROID STIM HORMONE: CPT | Performed by: EMERGENCY MEDICINE

## 2025-02-16 PROCEDURE — 250N000013 HC RX MED GY IP 250 OP 250 PS 637: Performed by: EMERGENCY MEDICINE

## 2025-02-16 PROCEDURE — 99291 CRITICAL CARE FIRST HOUR: CPT

## 2025-02-16 PROCEDURE — 250N000011 HC RX IP 250 OP 636: Performed by: EMERGENCY MEDICINE

## 2025-02-16 RX ORDER — LORAZEPAM 1 MG/1
1 TABLET ORAL EVERY 8 HOURS PRN
Status: DISCONTINUED | OUTPATIENT
Start: 2025-02-16 | End: 2025-02-17 | Stop reason: HOSPADM

## 2025-02-16 RX ORDER — DOCUSATE SODIUM 100 MG/1
100 CAPSULE, LIQUID FILLED ORAL 2 TIMES DAILY PRN
Status: DISCONTINUED | OUTPATIENT
Start: 2025-02-16 | End: 2025-02-17 | Stop reason: HOSPADM

## 2025-02-16 RX ORDER — ONDANSETRON 4 MG/1
4 TABLET, ORALLY DISINTEGRATING ORAL ONCE
Status: COMPLETED | OUTPATIENT
Start: 2025-02-16 | End: 2025-02-16

## 2025-02-16 RX ORDER — OLANZAPINE 5 MG/1
10 TABLET, ORALLY DISINTEGRATING ORAL 2 TIMES DAILY PRN
Status: DISCONTINUED | OUTPATIENT
Start: 2025-02-16 | End: 2025-02-17 | Stop reason: HOSPADM

## 2025-02-16 RX ORDER — LISINOPRIL AND HYDROCHLOROTHIAZIDE 20; 25 MG/1; MG/1
1 TABLET ORAL DAILY
Status: DISCONTINUED | OUTPATIENT
Start: 2025-02-16 | End: 2025-02-17 | Stop reason: HOSPADM

## 2025-02-16 RX ORDER — DIPHENHYDRAMINE HCL 25 MG
25 CAPSULE ORAL
Status: DISCONTINUED | OUTPATIENT
Start: 2025-02-16 | End: 2025-02-16

## 2025-02-16 RX ORDER — DOCUSATE SODIUM 100 MG/1
100 CAPSULE, LIQUID FILLED ORAL 2 TIMES DAILY PRN
COMMUNITY
End: 2025-02-20

## 2025-02-16 RX ORDER — VITAMIN B COMPLEX
25 TABLET ORAL DAILY
Status: DISCONTINUED | OUTPATIENT
Start: 2025-02-16 | End: 2025-02-16

## 2025-02-16 RX ORDER — POLYETHYLENE GLYCOL 3350 17 G/17G
17 POWDER, FOR SOLUTION ORAL DAILY PRN
Status: DISCONTINUED | OUTPATIENT
Start: 2025-02-16 | End: 2025-02-17 | Stop reason: HOSPADM

## 2025-02-16 RX ORDER — LORAZEPAM 1 MG/1
1 TABLET ORAL ONCE
Status: COMPLETED | OUTPATIENT
Start: 2025-02-16 | End: 2025-02-16

## 2025-02-16 RX ORDER — PANTOPRAZOLE SODIUM 40 MG/1
40 TABLET, DELAYED RELEASE ORAL
Status: DISCONTINUED | OUTPATIENT
Start: 2025-02-16 | End: 2025-02-17 | Stop reason: HOSPADM

## 2025-02-16 RX ORDER — OLANZAPINE 10 MG/2ML
10 INJECTION, POWDER, FOR SOLUTION INTRAMUSCULAR 2 TIMES DAILY PRN
Status: DISCONTINUED | OUTPATIENT
Start: 2025-02-16 | End: 2025-02-17 | Stop reason: HOSPADM

## 2025-02-16 RX ORDER — LINACLOTIDE 145 UG/1
145 CAPSULE, GELATIN COATED ORAL
COMMUNITY
Start: 2025-01-31 | End: 2025-02-20

## 2025-02-16 RX ORDER — POLYETHYLENE GLYCOL 3350 17 G/17G
1 POWDER, FOR SOLUTION ORAL DAILY PRN
COMMUNITY

## 2025-02-16 RX ORDER — POTASSIUM CHLORIDE 1500 MG/1
20 TABLET, EXTENDED RELEASE ORAL ONCE
Status: COMPLETED | OUTPATIENT
Start: 2025-02-16 | End: 2025-02-16

## 2025-02-16 RX ADMIN — POTASSIUM CHLORIDE 20 MEQ: 1500 TABLET, EXTENDED RELEASE ORAL at 08:59

## 2025-02-16 RX ADMIN — ONDANSETRON 4 MG: 4 TABLET, ORALLY DISINTEGRATING ORAL at 11:59

## 2025-02-16 RX ADMIN — SERTRALINE 150 MG: 100 TABLET, FILM COATED ORAL at 09:01

## 2025-02-16 RX ADMIN — POLYETHYLENE GLYCOL 3350 17 G: 17 POWDER, FOR SOLUTION ORAL at 17:00

## 2025-02-16 RX ADMIN — Medication 25 MCG: at 09:02

## 2025-02-16 RX ADMIN — PANTOPRAZOLE SODIUM 40 MG: 40 TABLET, DELAYED RELEASE ORAL at 09:01

## 2025-02-16 RX ADMIN — LISINOPRIL AND HYDROCHLOROTHIAZIDE 1 TABLET: 25; 20 TABLET ORAL at 09:01

## 2025-02-16 RX ADMIN — LINACLOTIDE 145 MCG: 145 CAPSULE, GELATIN COATED ORAL at 09:42

## 2025-02-16 RX ADMIN — LORAZEPAM 1 MG: 1 TABLET ORAL at 04:01

## 2025-02-16 RX ADMIN — OLANZAPINE 10 MG: 5 TABLET, ORALLY DISINTEGRATING ORAL at 04:01

## 2025-02-16 RX ADMIN — LORAZEPAM 1 MG: 1 TABLET ORAL at 01:25

## 2025-02-16 ASSESSMENT — ACTIVITIES OF DAILY LIVING (ADL)
ADLS_ACUITY_SCORE: 42
ADLS_ACUITY_SCORE: 42
ADLS_ACUITY_SCORE: 41
ADLS_ACUITY_SCORE: 42
ADLS_ACUITY_SCORE: 41
ADLS_ACUITY_SCORE: 41
ADLS_ACUITY_SCORE: 51
ADLS_ACUITY_SCORE: 41
ADLS_ACUITY_SCORE: 51
ADLS_ACUITY_SCORE: 41
ADLS_ACUITY_SCORE: 51
ADLS_ACUITY_SCORE: 41

## 2025-02-16 ASSESSMENT — COLUMBIA-SUICIDE SEVERITY RATING SCALE - C-SSRS
6. HAVE YOU EVER DONE ANYTHING, STARTED TO DO ANYTHING, OR PREPARED TO DO ANYTHING TO END YOUR LIFE?: NO
2. HAVE YOU ACTUALLY HAD ANY THOUGHTS OF KILLING YOURSELF IN THE PAST MONTH?: NO
1. IN THE PAST MONTH, HAVE YOU WISHED YOU WERE DEAD OR WISHED YOU COULD GO TO SLEEP AND NOT WAKE UP?: NO
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
## 2025-02-16 NOTE — ED PROVIDER NOTES
4:59 PM Mental health  inquiring whether patient needs to be on a 72-hour hold as they believe it will make them harder to place and will limit his options/choices.  Patient has been totally appropriate all day per nursing staff, has a one-to-one at bedside as well as family members constantly.  Has been acting totally appropriate, denies SI or HI.  Will cancel 72-hour hold at the time being, though if patient tries to leave, will need to be reevaluated to decide whether he would need to be placed back on a hold.     William Macedo MD  02/16/25 [id number]

## 2025-02-16 NOTE — Clinical Note
Viet Beebe was seen and treated in our emergency department on 2/16/2025.    Please excuse patient is currently in the hospital     Sincerely,     [facility] Emergency Room
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
## 2025-02-16 NOTE — Clinical Note
Viet Beebe was seen and treated in our emergency department on 2/16/2025.    Please excuse patient is currently in the hospital     Sincerely,     [facility] Emergency Room
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
## 2025-02-16 NOTE — ED NOTES
Pt awake and C/O abdominal discomfort.  Given Lactose free milk and a Banana with some relief.  Parents at bedside.  Family given work note for the patients job.  Pt is alert and cooperative.

## 2025-02-16 NOTE — ED NOTES
IP MH Referral Acuity Rating Score (RARS)    LMHP complete at referral to IP MH, with DEC; and, daily while awaiting IP MH placement. Call score to PPS.  CRITERIA SCORING   New 72 HH and Involuntary for IP MH (not adolescent) 3/3   Boarding over 24 hours 0/1   Vulnerable adult at least 55+ with multiple co morbidities; or, Patient age 11 or under 0/1   Suicide ideation without relief of precipitating factors 0/1   Current plan for suicide 0/1   Current plan for homicide 0/1   Imminent risk or actual attempt to seriously harm another without relief of factors precipitating the attempt 0/1   Severe dysfunction in daily living (ex: complete neglect for self care, extreme disruption in vegetative function, extreme deterioration in social interactions) 0/1   Recent (last 2 weeks) or current physical aggression in the ED 0/1   Restraints or seclusion episode in ED 0/1   Verbal aggression, agitation, yelling, etc., while in the ED 0/1   Active psychosis with psychomotor agitation or catatonia 1/1   Need for constant or near constant redirection (from leaving, from others, etc).  1/1   Intrusive or disruptive behaviors 0/1   TOTAL 5

## 2025-02-16 NOTE — ED NOTES
[facility] Neurology   IN-PATIENT SERVICE      NEUROLOGY H&P NOTE            Date:   4/27/2022  Patient name:  Eldon Dexter  Date of admission:  4/27/2022  YOB: 1968      Chief Complaint:     Chief Complaint   Patient presents with    Seizures       Reason for Consult:      Seizures    History of Present Illness: The patient is a 47 y.o. female presented with seizure tonic-clonic, patient said that she has a history of seizure several years ago almost 10 years, patient mentioned that yesterday she felt little bit dizzy the same symptoms when she used to have seizures she feels like she is going to pass out, she developed the same symptoms today and she lost her consciousness, witnessed by coworkers who noticed that she was shaking all over, upon arrival to the ED patient had another episode of seizure lasted for 1 minute, patient was loaded with 2 g of Keppra and a total of 4 mg of Versed, upon assessment patient was sitting comfortably no confusion, no focal weakness, patient mentioned that she supposed to be on Depakote however due to medical insurance issue she was not able to afford it    -Patient is not taking any medication, she reported that she has hypertension that is not managed, patient last follow-up with Dr. Ruby Foster was 5 years ago on 9/13/2016, she was following for headaches, she was taking Depakote 500 mg ER by mouth daily at bedtime  -MRI brain May 2016: Normal, MRA head and MRA neck May 2016: Normal, EEG brain May 2016: Normal,  -Dr. Ruby Foster impression fluctuating right hemiparesis and hemifacial spasm functionally overlie of the symptoms with underlying migraine headache, his recommendation to continue Depakote 500 mg daily for headache      Past Medical History:     Past Medical History:   Diagnosis Date    Asthma     Depression     ESBL (extended spectrum beta-lactamase) producing bacteria infection 8/8/20214    E.  Coli urine    Fibromyalgia     Gastroparesis     Headache Patient laughing and joking with support person. Closed his eyes, opened his eyes and said he was still Sam Musk.     Hyperlipidemia     Hypertension     Neuropathy     Osteoarthritis     Seizure ([facility] [address] 4/27/2022    Tennis elbow     right        Past Surgical History:     Past Surgical History:   Procedure Laterality Date    ADENOIDECTOMY      BREAST SURGERY      CARDIAC CATHETERIZATION  7-    COSMETIC SURGERY      nose    HYSTERECTOMY      KNEE SURGERY      SINUS SURGERY      TONSILLECTOMY          Medications Prior to Admission:     Prior to Admission medications    Medication Sig Start Date End Date Taking? Authorizing Provider   lidocaine (LIDODERM) 5 % Place 1 patch onto the skin daily 12 hours on, 12 hours off. 11/16/19   ANNA Yates - CNP   cyclobenzaprine (FLEXERIL) 10 MG tablet Take 1 tablet by mouth 3 times daily as needed for Muscle spasms 11/15/19   Toyin Lynn MD   benzocaine-menthol (CEPACOL SORE THROAT) 15-3.6 MG lozenge Take 1 lozenge by mouth every 2 hours as needed for Sore Throat 9/18/19   165 Exam[address] Bangs Tyree, PA-C   diclofenac sodium 1 % GEL Apply 2 g topically 2 times daily 4/20/19   Rosalio Tony DO   Elastic Bandages & Supports (KNEE BRACE ADJUSTABLE HINGED) MISC 1 Device by Does not apply route as needed (comfort) 12/19/18   Raomn Ni MD   AMITRIPTYLINE HCL PO Take by mouth    Historical Provider, MD   dicyclomine (BENTYL) 10 MG capsule Take 1 capsule by mouth every 6 hours as needed (cramps) 5/26/18   Kerwin Carlos DO   Misc. Devices (WRIST BRACE) MISC 1 Device by Does not apply route daily One brace to be worn at night and daily as needed for carpal tunnel syndrome.  Brace should place the wrist in neutral. 2/20/17   Willi Pringle DO   EPINEPHrine (EPIPEN) 0.3 MG/0.3ML SOAJ injection Inject 0.3 mg into the muscle as needed Use as directed for allergic reaction    Historical Provider, MD   divalproex (DEPAKOTE) 500 MG DR tablet Take 1 tablet by mouth daily 9/13/16   Edilberto Segal MD   metoprolol tartrate (LOPRESSOR) 50 MG tablet 2 IN AM AND 1 AT NIGHT 4/26/16   Historical Provider, MD   divalproex (DEPAKOTE ER) 500 MG ER tablet Take 1 tablet by mouth nightly 5/20/16   Dangelo Drake, DO   Elastic Bandages & Supports (TENNIS ELBOW SUPPORT) MISC 1 Device by Does not apply route as needed 6/2/15   Ayaan Higuera MD   pravastatin (PRAVACHOL) 20 MG tablet Take 20 mg by mouth daily. Historical Provider, MD   ondansetron (ZOFRAN) 4 MG tablet Take 1 tablet by mouth every 8 hours as needed for Nausea. 8/8/14   Riley Guaman,    indomethacin (INDOCIN) 50 MG capsule Take 1 capsule by mouth 3 times daily (with meals). 7/26/14   Joselyn Fleming MD   citalopram (CELEXA) 20 MG tablet Take 40 mg by mouth daily. Historical Provider, MD   losartan (COZAAR) 50 MG tablet Take 100 mg by mouth daily. Historical Provider, MD        Allergies:     Latex, Motrin [ibuprofen], Norco [hydrocodone-acetaminophen], Tramadol, Naproxen, and Tylenol [acetaminophen]    Social History:     Tobacco:    reports that she has never smoked. She has never used smokeless tobacco.  Alcohol:      reports current alcohol use. Drug Use:  reports current drug use. Drug: Marijuana Dauna Other).     Family History:     Family History   Problem Relation Age of Onset    Cancer Mother         breast and bone    Heart Disease Father     Diabetes Sister     High Blood Pressure Sister     Diabetes Brother     High Blood Pressure Brother     Heart Disease Maternal Grandmother     Cancer Maternal Grandmother         breast    Cancer Paternal Grandmother     Heart Disease Paternal Grandfather        Review of Systems:       Constitutional Negative for fever and chills   HEENT Negative for ear discharge, ear pain, nosebleed   Eyes Negative for photophobia, pain and discharge   Respiratory Negative for hemoptysis and sputum   Cardiovascular Negative for orthopnea, claudication and PND   Gastrointestinal Negative for abdominal pain, diarrhea, blood in stool   Musculoskeletal Negative for joint pain, negative for myalgia   Skin Negative for rash or itching   hematology Negative for ecchymosis, anemia   Psychiatric Negative for suicidal ideation, anxiety, depression, hallucinations       Physical Exam:   BP (!) 150/92   Pulse 86   Temp 98.3 °F (36.8 °C) (Oral)   Resp 22   Ht 5' 8\" (1.727 m)   Wt 260 lb (117.9 kg)   SpO2 100%   BMI 39.53 kg/m²   Temp (24hrs), Av.3 °F (36.8 °C), Min:98.3 °F (36.8 °C), Max:98.3 °F (36.8 °C)        General examination:      General Appearance:  alert, well appearing, and in no acute distress  HEENT: Normocephalic, atraumatic, moist mucus membranes  Neck: supple, no carotid bruits, (-) nuchal rigidity  Lungs:  Respirations unlabored, chest wall no deformity, BS normal  Cardiovascular: normal rate, regular rhythm  Abdomen: Soft, nontender, nondistended, normal bowel sounds  Skin: No gross lesions, rashes, bruising or bleeding on exposed skin area  Extremities:  peripheral pulses palpable, no cyanosis, clubbing or edema  Psych: normal affect      Neurological examination:      Mental status   Alert and oriented x 3; following all commands;   speech is fluent, no dysarthria, aphasia. Cranial nerves   II - visual fields intact to confrontation; pupils reactive  III, IV, VI - extraocular muscles intact; no CHADD; no nystagmus; no ptosis   V - normal facial sensation                                                               VII - normal facial symmetry                                                             VIII - intact hearing                                                                             IX, X - symmetrical palate elevation                                               XI - symmetrical shoulder shrug                                                       XII - midline tongue without atrophy or fasciculation     Motor function  Strength:   5/5 RUE, 5/5 RLE  5/5 LUE, 5/5  LLE  Normal bulk and tone.       Sensory function Intact to touch, pin, vibration, proprioception throughout     Cerebellar Intact finger-nose-finger testing. Intact heel-shin testing. No dysdiadochokinesia present. No tremors                        Reflex function 2/4 symmetric throughout . Downgoing plantar response bilaterally. (-)Farrell's sign bilaterally      Gait                  Not assessed           Diagnostics:      Laboratory Testing:  CBC:   Recent Labs     22  1857   WBC 5.5   HGB 12.2        BMP:    Recent Labs     22  1857      K 3.6*      CO2 22   BUN 7   CREATININE 0.64   GLUCOSE 96         Lab Results   Component Value Date    CHOL 186 2018    LDLCHOLESTEROL 119 2018    HDL 51 2018    TRIG 80 2018    ALT 11 2022    AST 17 2022    TSH 3.23 2018    INR 0.9 2016    LABA1C 5.4 08/10/2016    QDFIQRAS38 240 11/10/2011       No results found for: PHENYTOIN, PHENYTOIN, VALPROATE, CBMZ      Imaging/Diagnostics:  XR CHEST PORTABLE    Result Date: 2022  EXAMINATION: ONE XRAY VIEW OF THE CHEST 2022 7:18 pm COMPARISON: 2021. HISTORY: ORDERING SYSTEM PROVIDED HISTORY: seizure TECHNOLOGIST PROVIDED HISTORY: seizure Reason for Exam: upr,seizure FINDINGS: Mild bilateral interstitial infiltrates/edema. Cardiomegaly. Mediastinum normal.  Bony thorax intact.      Mild bilateral interstitial infiltrates or edema           Impression:      With 70-year-old female presented with multiple tonic-clonic seizures, lasted for couple minutes, patient has a history of seizure disorder 10 years ago, patient was supposed to be on Depakote 500 mg daily however she is not taking due to insurance issues, patient was loaded with 2 g of Keppra in the ED and received 4 mg of Versed for seizure ,  -Patient used to follow-up with Dr. Saige Mcmillan in the clinic for headache,  -Last EEG was in May 2016: Normal, MRI brain W0: MRA neck W0: Unremarkable in May 2016    Impression:  -Syncopal episodes versus tonic-clonic seizure    Plan:     -Consider MRI W0 brain  -EEG in the morning  -Keppra 500 twice daily  -Patient loaded with Keppra 2 g  -Seizure precautions  -Ativan for seizure  measures  -Further recommendation may follow          Electronically signed by Amberly Irving MD on 2022 at 7:52 PM      Electronically signed by   Amberly Irving MD  2022  7:52 PM

## 2025-02-16 NOTE — Clinical Note
Viet Beebe was seen and treated in our emergency department on 2/16/2025.    Please excuse patient is currently in the hospital     Sincerely,     [facility] Emergency Room

## 2025-02-16 NOTE — ED NOTES
"ED Behavioral Health Patient Transition of Care Note      Brief behavioral history:  Pt with concerns about increased confusion, feeling like he is other people, emotional lability; did have an episode during DEC assessment where he thought he heard gunshots and was increasingly agitated and restless.    Any outstanding medical concerns: h/o depression and anxiety, being treated as outpt    Has SW/DEC seen the patient:  yes    Is the patient on a hold:  72 hour hold signed    Current plan for disposition:  SW attempting to find a bed    Safety concerns:  Pt attempted to elope during DEC assessment but was redirectable back to his room with family assistance.    Dietary restrictions:  None, pt can eat and drink ad milla    When was the last room safety check: Not indicated - denies SI/HI and continues with 1:1    Who performed the last room safety check:     Significant events during shift:  Has been calm and cooperative with staff after hold was placed; he was agreeable with 1:1 sitter and with PO meds. Pt primary focus is \"getting some sleep;\" family is concerned about cost of stay and with getting a note for pt's work.   "

## 2025-02-16 NOTE — ED NOTES
"Pt cooperative.  Parents and 1:1 sitter at bedside.  Pt is open to getting help.  States he felt \"like I was acting manic\" yesterday.  Today has been cooperative, pleasant and open to cares.  "

## 2025-02-16 NOTE — ED TRIAGE NOTES
Pt states he has been dissociating today and just doesn't feel right. States he has been posting on facebook as danii ositok today and having random memories. States he lives alone. Denies SI. Denies drug or alcohol use.          Klisyri Pregnancy And Lactation Text: It is unknown if this medication can harm a developing fetus or if it is excreted in breast milk.

## 2025-02-16 NOTE — ED NOTES
Pt was agreeable with changing into behavioral scrubs and with taking PO meds; at this time he is calm and resting quietly in stretcher with lights dimmed. Parents are at bedside, 1:1 sitter is present and pt is tolerating well. He continues to deny any suicidal ideation or intent, or any homicidal thoughts.

## 2025-02-16 NOTE — TELEPHONE ENCOUNTER
S:R: [place]  Access Inpatient Bed Call Log 2/16/25 at 8:15 AM: Intake has called facilities that have not updated the bed status within the last 12 hours.                               [facility] is at capacity.           [facility] is posting 3 beds. [phone]  Not avlb  [facility] is posting 0 beds. Negative covid required.  [facility] is posting 0 beds. Neg covid. No high school/[place]-psych.  [place] is posting 0 beds. [phone]  [facility] is posting 0 beds. [phone]  [facility] is posting 0 beds. Negative covid. Per call @8:33am.  [facility] is posting 0 beds [phone].       [facility] is posting 2 beds. LOW acuity. Ages 12+. Neg covid. [phone]  [facility] has 0 beds posted. No aggression. Negative Covid. Low acuity.  [facility] is posting 1 bed. Low acuity only. Neg covid.  [phone]   [facility] is posting 2 beds. Low acuity. No current aggression.   [facility] is posting 0 beds. Negative covid. [phone]  [facility] is posting 2 beds available. Negative covid.  [phone].      [facility] is posting 0 beds. Low acuity. 72 HH hold preferred. [place] unit. Negative covid required. [phone]  [facility] ([place]) is posting 2 beds. Low acuity only. Neg covid.  [phone]  [facility] [place] is posting 3 beds.  Negative covid required.   Vol only, No history of aggression, violence, or assault. No sexual offenders. No 72 HH holds. [phone]       [facility] is posting 4 beds. Negative covid required.  (Must have the cognitive ability to do programming. No aggressive or violent behavior or recent HX in the last 2 yrs. MH must be primary.) Always low acuity.  [facility] has 0 beds posted. Negative covid required.  Low acuity only. Violence and aggression capped.  [phone] Per  call @8:53am, CRN not available. Can call back.  [facility]. Low acuity, Negative covid required. [phone]  [facility] posting 2 beds. Negative covid required.  [phone] Per call @8:47am, no answer  [facility]. Negative covid. LOW acuity. (No lines, drains, or tubes, oxygen, CPAP, IV, etc.) Must Have a Ride Home. [phone] Per call @8:51am, able to review.  [facility] is posting 5 beds. Negative covid. (No. lines, drains, or tubes, oxygen, CPAP, IV, etc.) [phone]       Pt remains on the work list pending appropriate bed availability.     [facility] is reviewing.    12:55  [place] is willing to review.  Clinical faxed 1:08 pm     2:15 PM per Bel at [facility], provider has declined due to pt acuity.  Feels he would need a more acute care bed at this time.    3:20 pm  Edith from [facility] called, their provider has declined.  They feel he is too acute for their unit.    4:50 PM per ED RN, pt is doing much better.  Calm , cooperative. Wanting help.  Reports he sometimes uses a CPAP.  Hold has been dropped for the time being.  [place].    6:35 Requested RN ask if they would be willing to go as far as [place] or [place].  Family will discuss and call Intake back.   Parents have been there all day with him.       Passed to slime shift.

## 2025-02-16 NOTE — ED PROVIDER NOTES
[facility] EMERGENCY ROOM   ED Mental Health Observation - Initiation Note    Viet Beebe was placed into observation at 4:22 AM on 2/16/2025 for Mental health crisis and Psychosis.   Patient is expected to be under observation status for a minimum of eight hours.    DO Dominic Hernandez James William, DO  02/16/25 0423

## 2025-02-16 NOTE — Clinical Note
Viet Beebe was seen and treated in our emergency department on 2/16/2025.    Please excuse patient is currently in the hospital     Sincerely,     [facility] Emergency Room

## 2025-02-16 NOTE — TELEPHONE ENCOUNTER
5:20am - Hi reviewing for possible placement. Awaiting update.      R: [facility] Access Inpatient Adult Bed Call Log  2/16/25 @ 1:00am   Intake has called facilities that have not updated their bed status within the last 12 hours.     *[place]:  [place] -- [facility]: @ capacity.  [facility]: POSTING 3 BEDS. No reviews overnight. #[phone]  [place] -- [facility]: @ cap per website. Low acuity. #[phone]  [place] -- [facility]: @ cap per website. Low acuity only. #[phone]  [place] -- [facility]: @ cap per website. #[phone]  [facility]: @ cap per website. #[phone]  [facility]/ beds: POSTING 6 BEDS, no reviews tonight- Ages 18-35, Voluntary only, NO aggression/physical or sexual assault/violence hx, or drug abuse. Negative Covid. #[phone]  [place] -- [facility]: @ cap per website. #[phone]  [place] -- [facility]: @ cap per website. #[phone]  [place] -- [facility]: @ cap per website. No reviews overnight. #[phone]    *[place] (by distance):  [facility]: POSTING 2 BEDS. Mixed unit - Ages 12 and up/Low acuity only. #[phone]  [facility] - @ cap per website. Low acuity, No aggression. #[phone]  [facility] - @ cap per website. #[phone]  [facility] - POSTING 2 BEDS. Low acuity only. No current aggression. #[phone]  [facility] - POSTING 1 BED. Negative Covid. Lower acuity only. #[phone]  [facility] - POSTING 2 BEDS. Low acuity only. Prefer med-adjustment placements. #[phone]  [facility] - POSTING 2 BEDS. No aggression. - Only Low Acuity reviews. #[phone]  [facility] - POSTING 2 BEDS. Senior Care Unit, 65+. Low acuity only. #[phone]  [facility]: @ [facility] per website. No aggressive behaviors. Do not review overnight. #[phone]  [place] -- : POSTING 3 BEDS.  No hx  of aggression. No sexual offenders. Voluntary patients only. #[phone]  [place] -- [facility]: POSTING 4 BEDS. Low acuity only. Must be able to do programming. No aggression/violent behavior in 2 years. No CD treatment. #[phone]  [facility]: POSTING 3 BEDS. Negative Covid test. Must be low acuity ONLY. #[phone]  [facility] POSTING 2 BEDS. Low acuity. Negative Covid. #[phone]  [facility]: POSTING 5 BEDS. No high acuity available [facility].   [place] [facility]: POSTING 2 BEDS. No hx of aggression/assault. No lines, drains or tubes. Does not provide detox or CD treatment. Require a confirmed ride upon discharge. #[phone]  [place] -- [facility]: @ cap per website. Negative COVID. No medical devices. #[phone]     Pt remains on waitlist pending appropriate placement availability.

## 2025-02-16 NOTE — Clinical Note
Viet Beebe was seen and treated in our emergency department on 2/16/2025.    Please excuse patient is currently in the hospital     Sincerely,     [facility] Emergency Room
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
## 2025-02-16 NOTE — PHARMACY-ADMISSION MEDICATION HISTORY
Pharmacist Admission Medication History    Admission medication history is complete. The information provided in this note is only as accurate as the sources available at the time of the update.    Medication reconciliation/reorder completed by provider prior to medication history? No    Information Source(s): [facility]    Pertinent Information: patient's father reports these medications are what he believes his son is taking.     Addendum: mother brought in current med bottles, med list updated below with changes. Patient started Linzess 2-3 weeks ago.    Changes made to PTA medication list:  Added: Linzess  Deleted: benadryl, melatonin, vitamin D3, multivitamin  Changed: None       Allergies reviewed with patient and updates made in EHR: yes    Medications available for use during hospital stay: NONE.     Medication History Completed By: Duyen Sibley PharmD 2/16/2025 7:31 AM    PTA Med List   Medication Sig Last Dose/Taking    docusate sodium (COLACE) 100 MG capsule Take 100 mg by mouth 2 times daily as needed for constipation. Unknown    LINZESS 145 MCG capsule Take 145 mcg by mouth every morning (before breakfast). Unknown    lisinopril-hydrochlorothiazide (ZESTORETIC) 20-25 MG tablet Take 1 tablet by mouth daily Unknown    omeprazole (PRILOSEC) 20 MG DR capsule TAKE 1 CAPSULE BY MOUTH 30 TO 60 MINUTES BEFORE A MEAL Unknown    polyethylene glycol (MIRALAX) 17 g packet Take 1 packet by mouth daily as needed for constipation. Unknown    sertraline (ZOLOFT) 100 MG tablet Take 1.5 tablets (150 mg) by mouth daily Unknown    SUMAtriptan (IMITREX) 50 MG tablet TAKE 1 TABLET AT ONSET OF HEADACHE FOR MIGRAINE MAY REPEAT IN 2 HOURS. MAXIMUM 4 TABLETS IN 24 HOURS Unknown

## 2025-02-16 NOTE — Clinical Note
Viet Beebe was seen and treated in our emergency department on 2/16/2025.    Please excuse patient is currently in the hospital     Sincerely,     [facility] Room
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
## 2025-02-17 ENCOUNTER — TELEPHONE (OUTPATIENT)
Dept: BEHAVIORAL HEALTH | Facility: CLINIC | Age: 40
End: 2025-02-17
Payer: COMMERCIAL

## 2025-02-17 ENCOUNTER — MYC MEDICAL ADVICE (OUTPATIENT)
Dept: FAMILY MEDICINE | Facility: CLINIC | Age: 40
End: 2025-02-17

## 2025-02-17 VITALS
HEIGHT: 69 IN | DIASTOLIC BLOOD PRESSURE: 78 MMHG | RESPIRATION RATE: 18 BRPM | OXYGEN SATURATION: 99 % | BODY MASS INDEX: 25.92 KG/M2 | TEMPERATURE: 97.6 F | SYSTOLIC BLOOD PRESSURE: 117 MMHG | WEIGHT: 175 LBS | HEART RATE: 79 BPM

## 2025-02-17 PROCEDURE — 250N000013 HC RX MED GY IP 250 OP 250 PS 637: Performed by: EMERGENCY MEDICINE

## 2025-02-17 RX ADMIN — PANTOPRAZOLE SODIUM 40 MG: 40 TABLET, DELAYED RELEASE ORAL at 09:26

## 2025-02-17 RX ADMIN — LISINOPRIL AND HYDROCHLOROTHIAZIDE 1 TABLET: 25; 20 TABLET ORAL at 09:26

## 2025-02-17 RX ADMIN — SERTRALINE 150 MG: 100 TABLET, FILM COATED ORAL at 09:26

## 2025-02-17 RX ADMIN — POLYETHYLENE GLYCOL 3350 17 G: 17 POWDER, FOR SOLUTION ORAL at 09:27

## 2025-02-17 ASSESSMENT — ACTIVITIES OF DAILY LIVING (ADL)
ADLS_ACUITY_SCORE: 51

## 2025-02-17 ASSESSMENT — COLUMBIA-SUICIDE SEVERITY RATING SCALE - C-SSRS
1. SINCE LAST CONTACT, HAVE YOU WISHED YOU WERE DEAD OR WISHED YOU COULD GO TO SLEEP AND NOT WAKE UP?: NO
SUICIDE, SINCE LAST CONTACT: NO
TOTAL  NUMBER OF ABORTED OR SELF INTERRUPTED ATTEMPTS SINCE LAST CONTACT: NO
TOTAL  NUMBER OF INTERRUPTED ATTEMPTS SINCE LAST CONTACT: NO
6. HAVE YOU EVER DONE ANYTHING, STARTED TO DO ANYTHING, OR PREPARED TO DO ANYTHING TO END YOUR LIFE?: NO
2. HAVE YOU ACTUALLY HAD ANY THOUGHTS OF KILLING YOURSELF?: NO
ATTEMPT SINCE LAST CONTACT: NO

## 2025-02-17 NOTE — CONSULTS
2/16/2025  Viet Beebe 1985     Writer consulted with ED nurse,  on this date at  12:54 PM. It was determined that pt would not benefit from assessment at this time due to Pt has already had an initial DEC assessment, ED has been referred to Extended [facility] for ongoing support while in the ED.      OR DEC order has been closed at this time.    Kat Velarde

## 2025-02-17 NOTE — PROGRESS NOTES
"Triage and Transition Services Extended Care Reassessment     Patient: Viet goes by \"Viet,\" uses he/him pronouns  Date of Service: February 17, 2025  Site of Service: [facility] Emergency Room                               Patient was seen yes  Mode of Assessment: Virtual: AmWell     Reason for Reassessment: worsening psychosocial stress, other (see comment) (Pt was initially seen for acute psychosis.)    History of Patient's Original Emergency Room Encounter: Previous diagnoses include MDD, MONIK, and Alcohol Use Disorder.    Current Patient Presentation: Pt presents as calm, cooperative, alert, and oriented x5. He is insightful, pleasant with interviewer, and seems honest and forthcoming. He denies suicidal ideation, plans, or intent. He denies homcidal thinking, urges for self-harm, or auditory or visual hallucinations. Thought processes are logical and organized.    Presentation Summary: Pt is seen by extended care for therapeutic check-in and reassessment. Exchanged greeting, introduced self and role. Pt was observed to be able to participate in the assessment as evidenced by verbal consent.  Pt was able to engage in a meaningful conversation about mental health symptoms, coping skills, and support systems. His parents were present during the room. Pt seems to have a loving relationship with his parents and they seem to be a strong support system. Pt denies having symptoms of paranoia and delusional thinking. Pt reports since he has been in the hospital he stopped taking Linezess. He wonders if somehow this new medication had caused the acute psychosis. He is planning to schedule with his primary provider to discuss. Pt indicates he recently started seeing a therapist and he plans to see them 1x per week on Wednesdays. He will continue with primary provider for medications. He says his mother will stay with him for a few days to help him get grounded. He has 3 cats he enjoys their " company and playing with. He spoke about cannabis use and thinking he will try and stop using it. He describes it sometimes causes him to feel sick and it causes anxiety.    Changes Observed Since Initial Assessment: decrease in presenting symptoms    Therapeutic Interventions Provided: Engaged in safety planning, Engaged in guided discovery, explored patient's perspectives and helped expand them through socratic dialogue.    Current Symptoms: anxious, excessive worry            Mental Status Exam   Affect: Appropriate  Appearance: Appropriate  Attention Span/Concentration: Attentive  Eye Contact: Engaged    Fund of Knowledge: Appropriate   Language /Speech Content: Fluent  Language /Speech Volume: Normal  Language /Speech Rate/Productions: Normal  Recent Memory: Intact  Remote Memory: Intact  Mood: Anxious  Orientation to Person: Yes   Orientation to Place: Yes  Orientation to Time of Day: Yes  Orientation to Date: Yes     Situation (Do they understand why they are here?): Yes  Psychomotor Behavior: Normal  Thought Content: Clear  Thought Form: Goal Directed    Treatment Objective(s) Addressed: rapport building, orienting the patient to therapy, building skills, assessing safety, processing feelings, identifying additional supports    Patient Response to Interventions: acceptance expressed, verbalizes understanding    Progress Towards Goals:  Patient Reports Symptoms Are: stable  Patient Progress Toward Goals: is making progress  Comment: Pt is considered to be stable and at baseline. He does not require acute stabilization services. He will reach out for help or return to the ER if symptoms return.    Case Management:      C-SSRS Since Last Contact:   1. Wish to be Dead (Since Last Contact): No  2. Non-Specific Active Suicidal Thoughts (Since Last Contact): No     Actual Attempt (Since Last Contact): No  Has subject engaged in non-suicidal self-injurious behavior? (Since Last Contact): No  Interrupted Attempts  (Since Last Contact): No  Aborted or Self-Interrupted Attempt (Since Last Contact): No  Preparatory Acts or Behavior (Since Last Contact): No  Suicide (Since Last Contact): No     Calculated C-SSRS Risk Score (Since Last Contact): No Risk Indicated    Plan: Final Disposition / Recommended Care Path: discharge  Plan for Care reviewed with assigned Medical Provider: yes  Plan for Care Team Review: provider  Comments: Consulted with Dr. Weinberg  Patient and/or validated legal guardian concurs: yes    Clinical Substantiation: Pt presents as calm, cooperative, alert, and oriented x5. He is insightful, pleasant with interviewer, and seems honest and forthcoming. He denies suicidal ideation, plans, or intent. He denies homcidal thinking, urges for self-harm, or auditory or visual hallucinations. Thought processes are logical and organized. Recommendation is for discharge. Pt will continue with therapy and continue with PCP for medications. His mother will stay with him for a few days to help him become grounded again. He endorses multiple supports and identifies protective facotors.    Legal Status: Legal Status: Voluntary/Patient has signed consent for treatment  72 Hour Hold - Date/Time Initiated: 72 is removed  72 Hour Hold - Date/Time Ends: 72 is removed    Session Status: Time session started: 1339  Time session ended: 1403  Session Duration (minutes): 23 minutes  Session Number: 1  Anticipated number of sessions or this episode of care: 1    Session Start Time: 1339  Session Stop Time: 1403  CPT codes: 00143 - Psychotherapy (with patient) - 30 (16-37*) min  Time Spent: 23 minutes      CPT code(s) utilized: 58984 - Psychotherapy (with patient) - 30 (16-37*) min    Diagnosis:   Patient Active Problem List   Diagnosis Code    CARDIOVASCULAR SCREENING; LDL GOAL LESS THAN 160 Z13.6    Chronic daily headache R51.9    Pre-syncope R55    Anxiety and depression F41.9, F32.A    Snoring R06.83    Major depression F32.9     Hypertension goal BP (blood pressure) < 130/80 I10    YVAN (obstructive sleep apnea) G47.33    Moderate episode of recurrent major depressive disorder (H) F33.1    Migraine with aura and without status migrainosus, not intractable G43.109    Gastroesophageal reflux disease without esophagitis K21.9    Generalized anxiety disorder F41.1    Psychosis (H) F29       Primary Problem This Admission: Active Hospital Problems    *Generalized anxiety disorder      Psychosis (H)    F41.1      Javon Masterson, [facility]   Licensed Mental Health Professional (LMHP), [facility]  [phone]

## 2025-02-17 NOTE — ED NOTES
"ED Behavioral Health Patient Transition of Care Note      Brief behavioral history:  psychosis, thought he was Sam Musk    Any outstanding medical concerns: CPAP utilization    Has SW/DEC seen the patient:  yes    Is the patient on a hold:   voluntary but holdable     Current plan for disposition:  SW attempting to find a bed    Safety concerns:  Pt has attempted to elope    Dietary restrictions:   Pt requesting more \"veggies\"    Significant events during shift:  None   "

## 2025-02-17 NOTE — ED NOTES
Pt and parens concerned about belongings. Mother states they did not take belongings home. Security verified that locker is empty and pt does not have any belongings here. Fathers states that belongings may be in other vehicle. Pt's mother has phone, wallet, and keys. Shoes, jeans, and shirt are still missing. Family agreed to go home and check.

## 2025-02-17 NOTE — DISCHARGE INSTRUCTIONS
"  Recommendations:    1)  Continue to follow up with weekly therapy visits  2)  If this problem returns, consider seeing a psychiatric provider for medication evaluation and management  3)  Contact the Extended Care Team for assistance if needed for scheduling psychiatric provider appointment ([phone]).    If I am feeling unsafe or I am in a crisis, I will:  - Contact my established care providers   - Call the National Suicide Prevention Lifeline: 949.314.8417   - Por espanol, texto  JES a [id number] o texto a 442-AYUDAME en WhatsApp  - [place] CRISIS TEXT Line 713945  - Go to the nearest emergency room   - Call 911 or 988 or 211    Below is a list of FREE Mental Health Options in the [facility] Area:     [facility] (Free Services)   [address], [place] [id number] [phone]  [email or web address]    [facility]  [address] [id number] [phone]    Peer Support  Call the LOYD Helpline at  [phone]  Or text \"HelpLine\" to 93857                 "

## 2025-02-17 NOTE — ED PROVIDER NOTES
" [facility] EMERGENCY ROOM   ED Mental Health Observation - Discharge Note (Complete)    Viet Beebe is boarding in the ED after undergoing evaluation for Mental health crisis  Please see the initial H&P for this patient's presentation, workup, and disposition plan.    Interim History:  There were no significant events since the last note    Physical Exam:  /78 (BP Location: Left arm)   Pulse 79   Temp 97.6  F (36.4  C) (Oral)   Resp 18   Ht 1.753 m (5' 9\")   Wt 79.4 kg (175 lb)   SpO2 99%   BMI 25.84 kg/m    No respiratory distress, on room air   Well perfused  Behavior appropriate    Medications provided prior to my care:  Medications   OLANZapine (zyPREXA) injection 10 mg (has no administration in time range)   LORazepam (ATIVAN) tablet 1 mg (1 mg Oral $Given 2/16/25 0401)   OLANZapine zydis (zyPREXA) ODT tab 10 mg (10 mg Oral $Given 2/16/25 0401)   lisinopril-hydrochlorothiazide (ZESTORETIC) 20-25 MG per tablet 1 tablet (1 tablet Oral $Given 2/17/25 0926)   pantoprazole (PROTONIX) EC tablet 40 mg (40 mg Oral $Given 2/17/25 0926)   sertraline (ZOLOFT) tablet 150 mg (150 mg Oral $Given 2/17/25 0926)   docusate sodium (COLACE) capsule 100 mg (has no administration in time range)   polyethylene glycol (MIRALAX) Packet 17 g (17 g Oral $Given 2/17/25 0927)   melatonin tablet 5 mg (5 mg Oral Not Given 2/17/25 0517)   LORazepam (ATIVAN) tablet 1 mg (1 mg Oral $Given 2/16/25 0125)   potassium chloride wallace ER (KLOR-CON M20) CR tablet 20 mEq (20 mEq Oral $Given 2/16/25 0859)   ondansetron (ZOFRAN ODT) ODT tab 4 mg (4 mg Oral $Given 2/16/25 1159)       Laboratory (reviewed and interpreted):  Labs Ordered and Resulted from Time of ED Arrival to Time of ED Departure   BASIC METABOLIC PANEL - Abnormal       Result Value    Sodium 131 (*)     Potassium 3.1 (*)     Chloride 92 (*)     Carbon Dioxide (CO2) 25      Anion Gap 14      Urea Nitrogen 12.8      Creatinine 0.77      GFR " Estimate >90      Calcium 9.0      Glucose 128 (*)    CBC WITH PLATELETS AND DIFFERENTIAL - Abnormal    WBC Count 12.6 (*)     RBC Count 5.61      Hemoglobin 15.4      Hematocrit 45.0      MCV 80      MCH 27.5      MCHC 34.2      RDW 13.0      Platelet Count 264      % Neutrophils 83      % Lymphocytes 12      % Monocytes 4      % Eosinophils 0      % Basophils 0      % Immature Granulocytes 0      NRBCs per 100 WBC 0      Absolute Neutrophils 10.5 (*)     Absolute Lymphocytes 1.5      Absolute Monocytes 0.5      Absolute Eosinophils 0.0      Absolute Basophils 0.0      Absolute Immature Granulocytes 0.0      Absolute NRBCs 0.0     URINE DRUG SCREEN PANEL - Abnormal    Amphetamines Urine Screen Negative      Barbituates Urine Screen Negative      Benzodiazepine Urine Screen Negative      Cannabinoids Urine Screen Positive (*)     Cocaine Urine Screen Negative      Fentanyl Qual Urine Screen Negative      Opiates Urine Screen Negative      PCP Urine Screen Negative     TSH WITH FREE T4 REFLEX - Normal    TSH 1.18     MAGNESIUM - Normal    Magnesium 1.9     ROUTINE UA WITH MICROSCOPIC REFLEX TO CULTURE - Normal    Color Urine Colorless      Appearance Urine Clear      Glucose Urine Negative      Bilirubin Urine Negative      Ketones Urine Negative      Specific Gravity Urine 1.009      Blood Urine Negative      pH Urine 7.0      Protein Albumin Urine Negative      Urobilinogen Urine <2.0      Nitrite Urine Negative      Leukocyte Esterase Urine Negative      RBC Urine 0      WBC Urine <1     COVID-19 VIRUS (CORONAVIRUS) BY PCR - Normal    SARS CoV2 PCR Negative         ED Course:  I discussed case with DEC , ED charge RN  I rounded on the patient    Impression/Plan:  Plan of care discussed at daily meeting; escalation of care considered.   Continue current plan for mental health treatment and placement, please see DEC  note for further details.    Upon reevaluation and discussion with DEC , we  believe patient has shown sufficient improvement and thus will be Discharged.    Patient was reevaluated today.  Patient and his family were in the room.  He currently is feeling much better.  He is not exhibiting ongoing psychosis.  He has not demonstrated any ongoing hallucinations, delusional thinking.  His last dose of Zyprexa appears to have been more than 24 hours ago.  Patient and his mother and father were interested in returning him home if feasible.  They do report that they can be in continuous attendance and help him out and monitor for any worsening of symptoms.  Patient has concerns about his Linzess medication although I cannot see how this would have clearly precipitated today's episode.  We discussed that I do not have a definite reason that he had some apparent psychosis during his initial presentation.  There would be some risk to returning home without a formal psychiatry consultation.  Nevertheless I do think he seems markedly improved from what was described previously.  He was reevaluated by DEC who agree that he is not exhibiting signs of ongoing psychosis and do not think that he is holdable or necessarily an immediate need of admission.  I do not think he represents a acute risk to himself.  Both the patient and his family would support him going home at this point.  He is well-connected with a therapist that he is going to see in 2 days.  He also is going to update his primary and has contact information for extended care if he would like to set up a psychiatry medication management appointment as well.  We discussed that he should return to the hospital right away if developing any recurrent symptoms of disorganized thinking, hallucinations, or other immediate concern.    EMERGENCY DEPARTMENT OBSERVATION status ended at 3:11 PM 2/17/2025    Discharge Management Time: > or equal to 30 minutes    1. Acute psychosis (H)        MD Sultana Mo Jeffrey, MD  02/17/25 7216

## 2025-02-17 NOTE — TELEPHONE ENCOUNTER
R:  Per MD notes, pt will be discharging. Per pt station, pt discharged @ 4:28 PM. Pt will be removed from [facility] work list at this time.       Work list updated. Behavioral intake no longer following.

## 2025-02-17 NOTE — TELEPHONE ENCOUNTER
R: [place]  Access Inpatient Bed Call Log 2/16/25 at 3:30PM: Intake has called facilities that have not updated the bed status within the last 12 hours.                         (Adults):  [facility] is at capacity.           [facility] is posting 3 beds. [phone]. Low acuity male only.  [facility] is posting 0 beds. Negative covid required.  [facility] is posting 0 beds. Neg covid. No [facility].  [place] is posting 0 beds. [phone]  [facility] is posting 0 beds. [phone]  [facility] is posting 6 beds. Negative covid. [phone], Per call @8:33am. No gonzalo beds, handful of F beds, M beds and 2 non-gender beds.  [facility] is posting 0 beds [phone].    7:57 PM Per ANNA MARIE Salinas pt's mother not yet on site, will contact when they arrive.    8:30 PM ANNA MARIE Salinas updated PPS that family would like to stick to [facility] search radius.    Pt remains on the work list pending appropriate bed availability.

## 2025-02-17 NOTE — CONSULTS
Initial Psychiatric Consult   Consult date: February 17, 2025    Psychiatry consulted today regarding acute psychosis. I discussed case with IP medical provider and Extended Care, both of whom feel patient is safe and appropriate for discharge. I support this recommendation.         Page me or re-consult psychiatry as needed (psychiatry is signing off).       Alexa Farley, SHADE, APRN  Consult/Liaison Psychiatry  [facility]   Contact information available via [facility] Paging/Directory.  If I am not available, please call [facility] intake ([phone])

## 2025-02-17 NOTE — TELEPHONE ENCOUNTER
R: [facility] Access Inpatient Adult Bed Call Log  2/17/25 @ 1:00am   Intake has called facilities that have not updated their bed status within the last 12 hours.     *METRO +1HR (by distance):  [place] [facility]: @ capacity.  [facility]: POSTING 3 BEDS. No reviews overnight. #[phone]  [place] -- [facility]: @ cap per website. Low acuity. #[phone]  [place] -- [facility]: @ cap per website. Low acuity only. #[phone]  [place] -- [facility]: @ cap per website. #[phone]  [facility]: @ cap per website. #[phone]  [facility]/ beds: POSTING 6 BEDS, no reviews tonight- Ages 18-35, Voluntary only, NO aggression/physical or sexual assault/violence hx, or drug abuse. Negative Covid. #[phone]  [place] [facility]: @ cap per website. #[phone]  [place] -- [facility]: @ cap per website. #[phone]  [place] -- [facility]: @ cap per website. No reviews overnight. #[phone]  [facility]: POSTING 1 BED. Mixed unit - Ages 12 and up/Low acuity only. #[phone]  [facility] - @ cap per website. Low acuity, No aggression. #[phone]  [facility] - @ cap per website. #[phone]  [facility] - @ cap per website. Low acuity only. No current aggression. #[phone]  [facility] - POSTING 1 BED. Negative Covid. Lower acuity only. #[phone]     Pt remains on waitlist pending appropriate placement availability.

## 2025-02-17 NOTE — ED NOTES
"Pt states  he occasionally \"feels disjointed.\"  \"I forgot I was in the hospital and thought I was here for my dad.  Then I realized it was me.\"  Continues to feel better than last evening.  Cooperative with cares.  Father and 1:1 at bedside.  "

## 2025-02-17 NOTE — ED NOTES
Parents spoke with writer, stating that patient is doing much better than when he arrived here. Parents inquiring about patient being re-evaluated, and following up with MH as an outpatient. Parents endorse that they are both retired, and at least one of them would always be with the patient. Parents also reported concern about the patient taking Linzess, stating that patients symptoms started not long after starting that medication a few weeks ago. MD updated, medication discontinued. Pt to be re-evaluated by DEC.

## 2025-02-19 ENCOUNTER — VIRTUAL VISIT (OUTPATIENT)
Dept: BEHAVIORAL HEALTH | Facility: CLINIC | Age: 40
End: 2025-02-19
Payer: COMMERCIAL

## 2025-02-19 DIAGNOSIS — F32.1 MODERATE MAJOR DEPRESSION, SINGLE EPISODE (H): ICD-10-CM

## 2025-02-19 DIAGNOSIS — F41.1 GENERALIZED ANXIETY DISORDER: Primary | ICD-10-CM

## 2025-02-19 PROBLEM — F33.3 SEVERE RECURRENT MAJOR DEPRESSION WITH PSYCHOTIC FEATURES (H): Status: ACTIVE | Noted: 2025-02-19

## 2025-02-19 PROCEDURE — 90791 PSYCH DIAGNOSTIC EVALUATION: CPT | Mod: 95

## 2025-02-19 NOTE — PROGRESS NOTES
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  provided by patient who is a good historian.    Patient will have open access to their mental health medical record.    9.   Interactive Complexity: No    10. Safety Plan: No Safety plan indicated    Provider Name/ Credentials:  Manjinder BRUNO Caro    February 19, 2025          "

## 2025-02-20 ENCOUNTER — VIRTUAL VISIT (OUTPATIENT)
Dept: FAMILY MEDICINE | Facility: CLINIC | Age: 40
End: 2025-02-20
Payer: COMMERCIAL

## 2025-02-20 DIAGNOSIS — E87.6 HYPOKALEMIA: ICD-10-CM

## 2025-02-20 DIAGNOSIS — F41.9 ANXIETY AND DEPRESSION: ICD-10-CM

## 2025-02-20 DIAGNOSIS — E87.1 HYPONATREMIA: ICD-10-CM

## 2025-02-20 DIAGNOSIS — F32.A ANXIETY AND DEPRESSION: ICD-10-CM

## 2025-02-20 DIAGNOSIS — F29 PSYCHOSIS, UNSPECIFIED PSYCHOSIS TYPE (H): Primary | ICD-10-CM

## 2025-02-20 DIAGNOSIS — D72.828 NEUTROPHILIA: ICD-10-CM

## 2025-02-20 PROCEDURE — 98006 SYNCH AUDIO-VIDEO EST MOD 30: CPT | Performed by: FAMILY MEDICINE

## 2025-02-21 ASSESSMENT — ANXIETY QUESTIONNAIRES
8. IF YOU CHECKED OFF ANY PROBLEMS, HOW DIFFICULT HAVE THESE MADE IT FOR YOU TO DO YOUR WORK, TAKE CARE OF THINGS AT HOME, OR GET ALONG WITH OTHER PEOPLE?: SOMEWHAT DIFFICULT
GAD7 TOTAL SCORE: 18
2. NOT BEING ABLE TO STOP OR CONTROL WORRYING: NEARLY EVERY DAY
7. FEELING AFRAID AS IF SOMETHING AWFUL MIGHT HAPPEN: MORE THAN HALF THE DAYS
3. WORRYING TOO MUCH ABOUT DIFFERENT THINGS: NEARLY EVERY DAY
GAD7 TOTAL SCORE: 18
1. FEELING NERVOUS, ANXIOUS, OR ON EDGE: NEARLY EVERY DAY
6. BECOMING EASILY ANNOYED OR IRRITABLE: SEVERAL DAYS
5. BEING SO RESTLESS THAT IT IS HARD TO SIT STILL: NEARLY EVERY DAY
IF YOU CHECKED OFF ANY PROBLEMS ON THIS QUESTIONNAIRE, HOW DIFFICULT HAVE THESE PROBLEMS MADE IT FOR YOU TO DO YOUR WORK, TAKE CARE OF THINGS AT HOME, OR GET ALONG WITH OTHER PEOPLE: SOMEWHAT DIFFICULT
4. TROUBLE RELAXING: NEARLY EVERY DAY

## 2025-02-25 ASSESSMENT — PATIENT HEALTH QUESTIONNAIRE - PHQ9
10. IF YOU CHECKED OFF ANY PROBLEMS, HOW DIFFICULT HAVE THESE PROBLEMS MADE IT FOR YOU TO DO YOUR WORK, TAKE CARE OF THINGS AT HOME, OR GET ALONG WITH OTHER PEOPLE: SOMEWHAT DIFFICULT
SUM OF ALL RESPONSES TO PHQ QUESTIONS 1-9: 11
SUM OF ALL RESPONSES TO PHQ QUESTIONS 1-9: 11

## 2025-02-26 ENCOUNTER — VIRTUAL VISIT (OUTPATIENT)
Dept: BEHAVIORAL HEALTH | Facility: CLINIC | Age: 40
End: 2025-02-26
Payer: COMMERCIAL

## 2025-02-26 DIAGNOSIS — F41.1 GENERALIZED ANXIETY DISORDER: Primary | ICD-10-CM

## 2025-02-26 PROCEDURE — 90834 PSYTX W PT 45 MINUTES: CPT | Mod: 95

## 2025-02-26 NOTE — PROGRESS NOTES
[facility]                                     Progress Note    Patient Name: Viet Beebe  Date: 2/26/25           Service Type: Individual      Session Start Time: 10:01 am  Session End Time: 10:47 am     Session Length: 46    Session #: 3    Attendees: Client attended alone    Service Modality:  Video Visit:      Provider verified identity through the following two step process.  Patient provided:  Patient is known previously to provider    Telemedicine Visit: The patient's condition can be safely assessed and treated via synchronous audio and visual telemedicine encounter.      Reason for Telemedicine Visit: Services only offered telehealth    Originating Site (Patient Location): Patient's home    Distant Site (Provider Location): Provider Remote Setting- Home Office    Consent:  The patient/guardian has verbally consented to: the potential risks and benefits of telemedicine (video visit) versus in person care; bill my insurance or make self-payment for services provided; and responsibility for payment of non-covered services.     Patient would like the video invitation sent by:  Send to e-mail at: [email or web address]    Mode of Communication:  [facility]    Distant Location (Provider):  Off-site    As the provider I attest to compliance with applicable laws and regulations related to telemedicine.    DATA  Interactive Complexity: No  Crisis: No        Progress Since Last Session (Related to Symptoms / Goals / Homework):   Symptoms: Improving Patient is feeling better mood-wise. He has stopped taking marijuana.    Homework: Achieved / completed to satisfaction      Episode of Care Goals: Achieved / completed to satisfaction - PREPARATION (Decided to change - considering how); Intervened by negotiating a change plan and determining options / strategies for behavior change, identifying triggers, exploring social supports, and working towards setting a date to begin behavior  change     Current / Ongoing Stressors and Concerns:   Patient is feeling very anxious possibly as the result of marijuana and alcohol withdrawal. Patient reflected on his sleep hygiene.  He also worked through reframing his inability to sleep as the possibility to do other things he has not time to do. In the last couple of weeks he was experiencing some psychosis and decided to get help at at the ER.     Treatment Objective(s) Addressed in This Session:   identify 3 fears / thoughts that contribute to feeling anxious       Intervention:   CBT: Reframing of negative thoughts.  Therapist also did psychoeducation in regards to how our nervous system works when we are withdrawing from alcohol and substances.  Therapist taught concept of window of tolerance.    Assessments completed prior to visit:  The following assessments were completed by patient for this visit:  PHQ9:       10/24/2022     9:01 AM 4/11/2023     7:08 AM 4/24/2023    11:11 AM 6/6/2024    11:42 AM 2/11/2025    11:51 AM 2/12/2025    10:28 AM 2/25/2025    10:58 AM   PHQ-9 SCORE   PHQ-9 Total Score MyChart 3 (Minimal depression) 6 (Mild depression) 6 (Mild depression) 9 (Mild depression) 10 (Moderate depression)  11 (Moderate depression)   PHQ-9 Total Score 3 6 6 9 10  13 11        Patient-reported     GAD7:       7/6/2021    12:11 PM 3/28/2022     9:22 AM 5/17/2022     3:34 PM 4/11/2023     7:09 AM 6/6/2024    11:43 AM 2/12/2025    10:28 AM 2/21/2025    10:35 AM   MONIK-7 SCORE   Total Score  6 (mild anxiety) 7 (mild anxiety) 6 (mild anxiety) 7 (mild anxiety)  18 (severe anxiety)   Total Score 6 6 7 6 7 18 18        Patient-reported     PROMIS 10-Global Health (all questions and answers displayed):       2/9/2025    12:38 PM 2/12/2025    10:23 AM 2/21/2025    10:39 AM   PROMIS 10   In general, would you say your health is: Fair  Good   In general, would you say your quality of life is: Good  Very good   In general, how would you rate your physical health?  Good  Very good   In general, how would you rate your mental health, including your mood and your ability to think? Poor  Good   In general, how would you rate your satisfaction with your social activities and relationships? Fair  Very good   In general, please rate how well you carry out your usual social activities and roles Very good  Excellent   To what extent are you able to carry out your everyday physical activities such as walking, climbing stairs, carrying groceries, or moving a chair? Completely  Completely   In the past 7 days, how often have you been bothered by emotional problems such as feeling anxious, depressed, or irritable? Often  Often   In the past 7 days, how would you rate your fatigue on average? Moderate  Moderate   In the past 7 days, how would you rate your pain on average, where 0 means no pain, and 10 means worst imaginable pain? 6  5   In general, would you say your health is: 2 3 3   In general, would you say your quality of life is: 3 3 4   In general, how would you rate your physical health? 3 3 4   In general, how would you rate your mental health, including your mood and your ability to think? 1 3 3   In general, how would you rate your satisfaction with your social activities and relationships? 2 4 4   In general, please rate how well you carry out your usual social activities and roles. (This includes activities at home, at work and in your community, and responsibilities as a parent, child, spouse, employee, friend, etc.) 4 3 5   To what extent are you able to carry out your everyday physical activities such as walking, climbing stairs, carrying groceries, or moving a chair? 5 4 5   In the past 7 days, how often have you been bothered by emotional problems such as feeling anxious, depressed, or irritable? 4 4 4   In the past 7 days, how would you rate your fatigue on average? 3 4 3   In the past 7 days, how would you rate your pain on average, where 0 means no pain, and 10 means  "worst imaginable pain? 6 4 5   Global Mental Health Score 8  12 13    Global Physical Health Score 14  12 15    PROMIS TOTAL - SUBSCORES 22  24 28        Patient-reported     [place] Suicide Severity Rating Scale (Lifetime/Recent)      4/4/2024    12:10 PM 2/12/2025    10:17 AM 2/16/2025    12:33 AM 2/16/2025     9:32 AM 2/16/2025     6:09 PM 2/16/2025    10:00 PM   [place] Suicide Severity Rating (Lifetime/Recent)   Q1 Wished to be Dead (Past Month) 0-->no  0-->no 0-->no 0-->no 0-->no   Q2 Suicidal Thoughts (Past Month) 0-->no  0-->no 0-->no 0-->no 0-->no   Q6 Suicide Behavior (Lifetime) 0-->no  0-->no 0-->no 0-->no 0-->no   Level of Risk per Screen no risks indicated  no risks indicated no risks indicated no risks indicated no risks indicated   1. Wish to be Dead (Lifetime)  Y       Wish to be Dead Description (Lifetime)  \"I do not feel worthy\" \" I do not feel that I deserve to be here.\"       1. Wish to be Dead (Past 1 Month)  N       2. Non-Specific Active Suicidal Thoughts (Lifetime)  N       Most Severe Ideation Rating (Lifetime)  3       Description of Most Severe Ideation (Lifetime)  \"I am going to be in pain forever.\"       Most Severe Ideation Rating (Past 1 Month)  1       Frequency (Lifetime)  2       Frequency (Past 1 Month)  1       Duration (Lifetime)  1       Duration (Past 1 Month)  1       Controllability (Lifetime)  1       Controllability (Past 1 Month)  1       Deterrents (Lifetime)  1       Deterrents (Past 1 Month)  1       Reasons for Ideation (Lifetime)  5       Reasons for Ideation (Past 1 Month)  1       Actual Attempt (Lifetime)  N       Has subject engaged in non-suicidal self-injurious behavior? (Lifetime)  N       Interrupted Attempts (Lifetime)  N       Aborted or Self-Interrupted Attempt (Lifetime)  N       Preparatory Acts or Behavior (Lifetime)  N       Calculated C-SSRS Risk Score (Lifetime/Recent)  No Risk Indicated             ASSESSMENT: Current Emotional / Mental Status " (status of significant symptoms):   Risk status (Self / Other harm or suicidal ideation)   Patient denies current fears or concerns for personal safety.   Patient denies current or recent suicidal ideation or behaviors.   Patient denies current or recent homicidal ideation or behaviors.   Patient denies current or recent self injurious behavior or ideation.   Patient denies other safety concerns.   Patient reports there has been no change in risk factors since their last session.     Patient reports there has been no change in protective factors since their last session.     Recommended that patient call 911 or go to the local ED should there be a change in any of these risk factors     Appearance:   Appropriate    Eye Contact:   Good    Psychomotor Behavior: Normal    Attitude:   Cooperative  Friendly Attentive   Orientation:   All   Speech    Rate / Production: Normal     Volume:  Normal    Mood:    Anxious  Fearful   Affect:    Appropriate    Thought Content:  Clear    Thought Form:  Coherent  Logical    Insight:    Good      Medication Review:   No changes to current psychiatric medication(s)     Medication Compliance:   Yes     Changes in Health Issues:   None reported     Chemical Use Review:   Substance Use: Chemical use reviewed, no active concerns identified      Tobacco Use: No current tobacco use.      Diagnosis:  1. Generalized anxiety disorder        Collateral Reports Completed:   Not Applicable    PLAN: (Patient Tasks / Therapist Tasks / Other)  Patient will practice setting boundaries with co-workers.  Patient will continue documenting feelings  Patient will continue expanding and documenting ways to cope with strategy        Manjinder BRUNO Caro                                                         ______________________________________________________________________    Individual Treatment Plan    Patient's Name: Viet Beebe  YOB: 1985    Date of Creation: 2/26/2025  Date Treatment  Plan Last Reviewed/Revised: 2/26/2025    DSM5 Diagnoses: 300.02 (F41.1) Generalized Anxiety Disorder  Psychosocial / Contextual Factors: Patient has stopped using alcohol and marijuana and has been experiencing an increase in anxiety.  PROMIS (reviewed every 90 days): PROMIS-10 Scores        2/9/2025    12:38 PM 2/12/2025    10:23 AM 2/21/2025    10:39 AM   PROMIS-10 Total Score w/o Sub Scores   PROMIS TOTAL - SUBSCORES 22  24 28        Patient-reported         Referral / Collaboration:  Referral to another professional/service is not indicated at this time..    Anticipated number of session for this episode of care:  20-30 sessions  Anticipation frequency of session: Weekly  Anticipated Duration of each session: 38-52 minutes  Treatment plan will be reviewed in 90 days or when goals have been changed.       MeasurableTreatment Goal(s) related to diagnosis / functional impairment(s)  Goal 1: Patient will like to manage his anxiety    I will know I've met my goal when my anxiety goes down.      Objective #A (Patient Action)    Patient will  set boundaries at work .  Status: New - Date: 2/26/2025      Intervention(s)  Therapist will teach about healthy boundaries.   .    Objective #B  Patient will  name and express his feelings .  Status: New - Date: 2/26/2025      Intervention(s)  Therapist will assign homework Patient will use emotions wheel .    Patient has reviewed and agreed to the above plan.      Manjinder BRUNO Caro  February 26, 2025

## 2025-02-27 ENCOUNTER — LAB (OUTPATIENT)
Dept: LAB | Facility: CLINIC | Age: 40
End: 2025-02-27
Payer: COMMERCIAL

## 2025-02-27 ENCOUNTER — TRANSFERRED RECORDS (OUTPATIENT)
Dept: HEALTH INFORMATION MANAGEMENT | Facility: CLINIC | Age: 40
End: 2025-02-27

## 2025-02-27 DIAGNOSIS — Z13.1 SCREENING FOR DIABETES MELLITUS: ICD-10-CM

## 2025-02-27 DIAGNOSIS — E87.1 HYPONATREMIA: ICD-10-CM

## 2025-02-27 DIAGNOSIS — K21.9 GASTROESOPHAGEAL REFLUX DISEASE WITHOUT ESOPHAGITIS: ICD-10-CM

## 2025-02-27 DIAGNOSIS — D72.828 NEUTROPHILIA: ICD-10-CM

## 2025-02-27 DIAGNOSIS — R63.1 EXCESSIVE THIRST: ICD-10-CM

## 2025-02-27 DIAGNOSIS — E87.6 HYPOKALEMIA: ICD-10-CM

## 2025-02-27 LAB
ANION GAP SERPL CALCULATED.3IONS-SCNC: 11 MMOL/L (ref 7–15)
BUN SERPL-MCNC: 20.3 MG/DL (ref 6–20)
CALCIUM SERPL-MCNC: 9.5 MG/DL (ref 8.8–10.4)
CHLORIDE SERPL-SCNC: 96 MMOL/L (ref 98–107)
CREAT SERPL-MCNC: 0.81 MG/DL (ref 0.67–1.17)
EGFRCR SERPLBLD CKD-EPI 2021: >90 ML/MIN/1.73M2
ERYTHROCYTE [DISTWIDTH] IN BLOOD BY AUTOMATED COUNT: 12.5 % (ref 10–15)
EST. AVERAGE GLUCOSE BLD GHB EST-MCNC: 111 MG/DL
FASTING STATUS PATIENT QL REPORTED: YES
FASTING STATUS PATIENT QL REPORTED: YES
GLUCOSE SERPL-MCNC: 114 MG/DL (ref 70–99)
GLUCOSE SERPL-MCNC: 114 MG/DL (ref 70–99)
HBA1C MFR BLD: 5.5 % (ref 0–5.6)
HCO3 SERPL-SCNC: 26 MMOL/L (ref 22–29)
HCT VFR BLD AUTO: 46.7 % (ref 40–53)
HGB BLD-MCNC: 15.7 G/DL (ref 13.3–17.7)
MCH RBC QN AUTO: 27.6 PG (ref 26.5–33)
MCHC RBC AUTO-ENTMCNC: 33.6 G/DL (ref 31.5–36.5)
MCV RBC AUTO: 82 FL (ref 78–100)
PLATELET # BLD AUTO: 311 10E3/UL (ref 150–450)
POTASSIUM SERPL-SCNC: 4 MMOL/L (ref 3.4–5.3)
RBC # BLD AUTO: 5.69 10E6/UL (ref 4.4–5.9)
SODIUM SERPL-SCNC: 133 MMOL/L (ref 135–145)
WBC # BLD AUTO: 5.7 10E3/UL (ref 4–11)

## 2025-02-27 RX ORDER — OMEPRAZOLE 20 MG/1
CAPSULE, DELAYED RELEASE ORAL
Qty: 90 CAPSULE | Refills: 1 | OUTPATIENT
Start: 2025-02-27

## 2025-03-05 ENCOUNTER — VIRTUAL VISIT (OUTPATIENT)
Dept: BEHAVIORAL HEALTH | Facility: CLINIC | Age: 40
End: 2025-03-05
Payer: COMMERCIAL

## 2025-03-05 DIAGNOSIS — F41.1 GENERALIZED ANXIETY DISORDER: Primary | ICD-10-CM

## 2025-03-05 DIAGNOSIS — F32.1 MODERATE MAJOR DEPRESSION, SINGLE EPISODE (H): ICD-10-CM

## 2025-03-05 PROCEDURE — 90834 PSYTX W PT 45 MINUTES: CPT | Mod: 95

## 2025-03-05 NOTE — PROGRESS NOTES
[facility]                                     Progress Note    Patient Name: Viet Beebe  Date: 3/05/25           Service Type: Individual      Session Start Time: 10:05 am  Session End Time: 10:52 am     Session Length: 47    Session #: 4    Attendees: Client attended alone    Service Modality:  Video Visit:      Provider verified identity through the following two step process.  Patient provided:  Patient is known previously to provider    Telemedicine Visit: The patient's condition can be safely assessed and treated via synchronous audio and visual telemedicine encounter.      Reason for Telemedicine Visit: Services only offered telehealth    Originating Site (Patient Location): Patient's home    Distant Site (Provider Location): Provider Remote Setting- Home Office    Consent:  The patient/guardian has verbally consented to: the potential risks and benefits of telemedicine (video visit) versus in person care; bill my insurance or make self-payment for services provided; and responsibility for payment of non-covered services.     Patient would like the video invitation sent by:  Send to e-mail at: [email or web address]    Mode of Communication:  Video Conference via [facility]    Distant Location (Provider):  Off-site    As the provider I attest to compliance with applicable laws and regulations related to telemedicine.    DATA  Interactive Complexity: No  Crisis: No        Progress Since Last Session (Related to Symptoms / Goals / Homework):   Symptoms: Improving Patient is feeling better mood-wise.     Homework: Partially completed      Episode of Care Goals: Satisfactory progress - ACTION (Actively working towards change); Intervened by reinforcing change plan / affirming steps taken     Current / Ongoing Stressors and Concerns:   Pt reports ongoing stressors as navigating various relationships and roles in different areas of life. Pt shared events that took place since last session and  "processed emotions. Pt and provider discussed external and internal conflicts, what it means to be \"myself\" and exploring self expression.Pt reports mood since last session has improved.  He is sleeping better and anxiety has decreased.  In the session client documented 6-7 coping skills that including activities he wanted to try.  Furthermore, patient reflected on his childhood.  Specifically, he talked about pleasing behaviors he saw in his parents.      Treatment Objective(s) Addressed in This Session:   use at least 4 coping skills for anxiety management in the next 4 weeks  identify two areas of life that you would like to have improved functioning       Intervention:   CBT: Reframing of negative thoughts.  Interpersonal Therapy: Payschoeducation regarding healthy relationships with boundaries  Motivational Interviewing: Asking questions about reasons for changing some habits    Assessments completed prior to visit:  The following assessments were completed by patient for this visit:  PHQ9:       10/24/2022     9:01 AM 4/11/2023     7:08 AM 4/24/2023    11:11 AM 6/6/2024    11:42 AM 2/11/2025    11:51 AM 2/12/2025    10:28 AM 2/25/2025    10:58 AM   PHQ-9 SCORE   PHQ-9 Total Score MyChart 3 (Minimal depression) 6 (Mild depression) 6 (Mild depression) 9 (Mild depression) 10 (Moderate depression)  11 (Moderate depression)   PHQ-9 Total Score 3 6 6 9 10  13 11        Patient-reported     GAD7:       7/6/2021    12:11 PM 3/28/2022     9:22 AM 5/17/2022     3:34 PM 4/11/2023     7:09 AM 6/6/2024    11:43 AM 2/12/2025    10:28 AM 2/21/2025    10:35 AM   MONIK-7 SCORE   Total Score  6 (mild anxiety) 7 (mild anxiety) 6 (mild anxiety) 7 (mild anxiety)  18 (severe anxiety)   Total Score 6 6 7 6 7 18 18        Patient-reported     PROMIS 10-Global Health (all questions and answers displayed):       2/9/2025    12:38 PM 2/12/2025    10:23 AM 2/21/2025    10:39 AM   PROMIS 10   In general, would you say your health is: Fair  " Good   In general, would you say your quality of life is: Good  Very good   In general, how would you rate your physical health? Good  Very good   In general, how would you rate your mental health, including your mood and your ability to think? Poor  Good   In general, how would you rate your satisfaction with your social activities and relationships? Fair  Very good   In general, please rate how well you carry out your usual social activities and roles Very good  Excellent   To what extent are you able to carry out your everyday physical activities such as walking, climbing stairs, carrying groceries, or moving a chair? Completely  Completely   In the past 7 days, how often have you been bothered by emotional problems such as feeling anxious, depressed, or irritable? Often  Often   In the past 7 days, how would you rate your fatigue on average? Moderate  Moderate   In the past 7 days, how would you rate your pain on average, where 0 means no pain, and 10 means worst imaginable pain? 6  5   In general, would you say your health is: 2 3 3   In general, would you say your quality of life is: 3 3 4   In general, how would you rate your physical health? 3 3 4   In general, how would you rate your mental health, including your mood and your ability to think? 1 3 3   In general, how would you rate your satisfaction with your social activities and relationships? 2 4 4   In general, please rate how well you carry out your usual social activities and roles. (This includes activities at home, at work and in your community, and responsibilities as a parent, child, spouse, employee, friend, etc.) 4 3 5   To what extent are you able to carry out your everyday physical activities such as walking, climbing stairs, carrying groceries, or moving a chair? 5 4 5   In the past 7 days, how often have you been bothered by emotional problems such as feeling anxious, depressed, or irritable? 4 4 4   In the past 7 days, how would you rate  "your fatigue on average? 3 4 3   In the past 7 days, how would you rate your pain on average, where 0 means no pain, and 10 means worst imaginable pain? 6 4 5   Global Mental Health Score 8  12 13    Global Physical Health Score 14  12 15    PROMIS TOTAL - SUBSCORES 22  24 28        Patient-reported     [place] Suicide Severity Rating Scale (Lifetime/Recent)      4/4/2024    12:10 PM 2/12/2025    10:17 AM 2/16/2025    12:33 AM 2/16/2025     9:32 AM 2/16/2025     6:09 PM 2/16/2025    10:00 PM   [place] Suicide Severity Rating (Lifetime/Recent)   Q1 Wished to be Dead (Past Month) 0-->no  0-->no 0-->no 0-->no 0-->no   Q2 Suicidal Thoughts (Past Month) 0-->no  0-->no 0-->no 0-->no 0-->no   Q6 Suicide Behavior (Lifetime) 0-->no  0-->no 0-->no 0-->no 0-->no   Level of Risk per Screen no risks indicated  no risks indicated no risks indicated no risks indicated no risks indicated   1. Wish to be Dead (Lifetime)  Y       Wish to be Dead Description (Lifetime)  \"I do not feel worthy\" \" I do not feel that I deserve to be here.\"       1. Wish to be Dead (Past 1 Month)  N       2. Non-Specific Active Suicidal Thoughts (Lifetime)  N       Most Severe Ideation Rating (Lifetime)  3       Description of Most Severe Ideation (Lifetime)  \"I am going to be in pain forever.\"       Most Severe Ideation Rating (Past 1 Month)  1       Frequency (Lifetime)  2       Frequency (Past 1 Month)  1       Duration (Lifetime)  1       Duration (Past 1 Month)  1       Controllability (Lifetime)  1       Controllability (Past 1 Month)  1       Deterrents (Lifetime)  1       Deterrents (Past 1 Month)  1       Reasons for Ideation (Lifetime)  5       Reasons for Ideation (Past 1 Month)  1       Actual Attempt (Lifetime)  N       Has subject engaged in non-suicidal self-injurious behavior? (Lifetime)  N       Interrupted Attempts (Lifetime)  N       Aborted or Self-Interrupted Attempt (Lifetime)  N       Preparatory Acts or Behavior (Lifetime)  N     "   Calculated C-SSRS Risk Score (Lifetime/Recent)  No Risk Indicated             ASSESSMENT: Current Emotional / Mental Status (status of significant symptoms):   Risk status (Self / Other harm or suicidal ideation)   Patient denies current fears or concerns for personal safety.   Patient denies current or recent suicidal ideation or behaviors.   Patient denies current or recent homicidal ideation or behaviors.   Patient denies current or recent self injurious behavior or ideation.   Patient denies other safety concerns.   Patient reports there has been no change in risk factors since their last session.     Patient reports there has been no change in protective factors since their last session.     Recommended that patient call 911 or go to the local ED should there be a change in any of these risk factors     Appearance:   Appropriate    Eye Contact:   Good    Psychomotor Behavior: Normal    Attitude:   Cooperative  Friendly Attentive   Orientation:   All   Speech    Rate / Production: Normal     Volume:  Normal    Mood:    Anxious  Fearful   Affect:    Appropriate    Thought Content:  Clear    Thought Form:  Coherent  Logical    Insight:    Good      Medication Review:   No changes to current psychiatric medication(s)     Medication Compliance:   Yes     Changes in Health Issues:   None reported     Chemical Use Review:   Substance Use: Chemical use reviewed, no active concerns identified      Tobacco Use: No current tobacco use.      Diagnosis:  1. Generalized anxiety disorder    2. Moderate major depression, single episode (H)          Collateral Reports Completed:   Not Applicable    PLAN: (Patient Tasks / Therapist Tasks / Other)  Patient will continue practicing setting boundaries with co-workers.  Patient will continue documenting feelings  Patient will continue expanding and documenting ways to cope with strategy    Patient will continue working on these goals      Manjinder BRUNO Caro                                                          ______________________________________________________________________    Individual Treatment Plan    Patient's Name: Viet Beebe  YOB: 1985    Date of Creation: 2/26/2025  Date Treatment Plan Last Reviewed/Revised: 2/26/2025    DSM5 Diagnoses: 300.02 (F41.1) Generalized Anxiety Disorder  Psychosocial / Contextual Factors: Patient has stopped using alcohol and marijuana and has been experiencing an increase in anxiety.  PROMIS (reviewed every 90 days): PROMIS-10 Scores        2/9/2025    12:38 PM 2/12/2025    10:23 AM 2/21/2025    10:39 AM   PROMIS-10 Total Score w/o Sub Scores   PROMIS TOTAL - SUBSCORES 22  24 28        Patient-reported         Referral / Collaboration:  Referral to another professional/service is not indicated at this time..    Anticipated number of session for this episode of care:  20-30 sessions  Anticipation frequency of session: Weekly  Anticipated Duration of each session: 38-52 minutes  Treatment plan will be reviewed in 90 days or when goals have been changed.       MeasurableTreatment Goal(s) related to diagnosis / functional impairment(s)  Goal 1: Patient will like to manage his anxiety    I will know I've met my goal when my anxiety goes down.      Objective #A (Patient Action)    Patient will  set boundaries at work .  Status: New - Date: 2/26/2025      Intervention(s)  Therapist will teach about healthy boundaries.   .    Objective #B  Patient will  name and express his feelings .  Status: New - Date: 2/26/2025      Intervention(s)  Therapist will assign homework Patient will use emotions wheel .    Patient has reviewed and agreed to the above plan.      Manjinder BRUNO Caro  February 26, 2025

## 2025-03-12 ENCOUNTER — VIRTUAL VISIT (OUTPATIENT)
Dept: BEHAVIORAL HEALTH | Facility: CLINIC | Age: 40
End: 2025-03-12
Payer: COMMERCIAL

## 2025-03-12 DIAGNOSIS — F41.1 GENERALIZED ANXIETY DISORDER: Primary | ICD-10-CM

## 2025-03-12 PROCEDURE — 90834 PSYTX W PT 45 MINUTES: CPT | Mod: 95

## 2025-03-12 NOTE — PROGRESS NOTES
[facility]                                     Progress Note    Patient Name: Viet Beebe  Date: 3/12/25           Service Type: Individual      Session Start Time: 10:06 am  Session End Time: 10:53 am     Session Length: 47 minutes    Session #: 5    Attendees: Client attended alone    Service Modality:  Video Visit:      Provider verified identity through the following two step process.  Patient provided:  Patient is known previously to provider    Telemedicine Visit: The patient's condition can be safely assessed and treated via synchronous audio and visual telemedicine encounter.      Reason for Telemedicine Visit: Services only offered telehealth    Originating Site (Patient Location): Patient's home    Distant Site (Provider Location): Provider Remote Setting- Home Office    Consent:  The patient/guardian has verbally consented to: the potential risks and benefits of telemedicine (video visit) versus in person care; bill my insurance or make self-payment for services provided; and responsibility for payment of non-covered services.     Patient would like the video invitation sent by:  Send to e-mail at: [email or web address]    Mode of Communication:  Video Conference via [facility]    Distant Location (Provider):  Off-site    As the provider I attest to compliance with applicable laws and regulations related to telemedicine.    DATA  Interactive Complexity: No  Crisis: No        Progress Since Last Session (Related to Symptoms / Goals / Homework):   Symptoms: No change This week patient was frustrated and disappointed as he had not been able to change some habits.    Homework: Partially completed      Episode of Care Goals: Minimal progress - CONTEMPLATION (Considering change and yet undecided); Intervened by assessing the negative and positive thinking (ambivalence) about behavior change     Current / Ongoing Stressors and Concerns:   Pt reports ongoing stressors as navigating various  "relationships and roles in different areas of life. Pt shared events that took place since last session and processed emotions. Pt and provider discussed external and internal conflicts, what it means to be \"myself\" and exploring self expression.  Patient started exploring his values and beliefs.  He noticed his locus of control has been external and would like to increase self-knowledge. He noticed he appreciates his job, his family, and his friends.     Treatment Objective(s) Addressed in This Session:   use thought-stopping strategy daily to reduce intrusive thoughts  identify 3 positives concerning self-esteem each session of therapy       Intervention:   CBT: Reframing of negative thoughts.  Interpersonal Therapy: Payschoeducation regarding healthy relationships with boundaries  Motivational Interviewing: Asking questions about reasons for changing some habits    Assessments completed prior to visit:  The following assessments were completed by patient for this visit:  PHQ9:       10/24/2022     9:01 AM 4/11/2023     7:08 AM 4/24/2023    11:11 AM 6/6/2024    11:42 AM 2/11/2025    11:51 AM 2/12/2025    10:28 AM 2/25/2025    10:58 AM   PHQ-9 SCORE   PHQ-9 Total Score MyChart 3 (Minimal depression) 6 (Mild depression) 6 (Mild depression) 9 (Mild depression) 10 (Moderate depression)  11 (Moderate depression)   PHQ-9 Total Score 3 6 6 9 10  13 11        Patient-reported     GAD7:       7/6/2021    12:11 PM 3/28/2022     9:22 AM 5/17/2022     3:34 PM 4/11/2023     7:09 AM 6/6/2024    11:43 AM 2/12/2025    10:28 AM 2/21/2025    10:35 AM   MONIK-7 SCORE   Total Score  6 (mild anxiety) 7 (mild anxiety) 6 (mild anxiety) 7 (mild anxiety)  18 (severe anxiety)   Total Score 6 6 7 6 7 18 18        Patient-reported     PROMIS 10-Global Health (all questions and answers displayed):       2/9/2025    12:38 PM 2/12/2025    10:23 AM 2/21/2025    10:39 AM   PROMIS 10   In general, would you say your health is: Fair  Good   In " general, would you say your quality of life is: Good  Very good   In general, how would you rate your physical health? Good  Very good   In general, how would you rate your mental health, including your mood and your ability to think? Poor  Good   In general, how would you rate your satisfaction with your social activities and relationships? Fair  Very good   In general, please rate how well you carry out your usual social activities and roles Very good  Excellent   To what extent are you able to carry out your everyday physical activities such as walking, climbing stairs, carrying groceries, or moving a chair? Completely  Completely   In the past 7 days, how often have you been bothered by emotional problems such as feeling anxious, depressed, or irritable? Often  Often   In the past 7 days, how would you rate your fatigue on average? Moderate  Moderate   In the past 7 days, how would you rate your pain on average, where 0 means no pain, and 10 means worst imaginable pain? 6  5   In general, would you say your health is: 2 3 3   In general, would you say your quality of life is: 3 3 4   In general, how would you rate your physical health? 3 3 4   In general, how would you rate your mental health, including your mood and your ability to think? 1 3 3   In general, how would you rate your satisfaction with your social activities and relationships? 2 4 4   In general, please rate how well you carry out your usual social activities and roles. (This includes activities at home, at work and in your community, and responsibilities as a parent, child, spouse, employee, friend, etc.) 4 3 5   To what extent are you able to carry out your everyday physical activities such as walking, climbing stairs, carrying groceries, or moving a chair? 5 4 5   In the past 7 days, how often have you been bothered by emotional problems such as feeling anxious, depressed, or irritable? 4 4 4   In the past 7 days, how would you rate your  "fatigue on average? 3 4 3   In the past 7 days, how would you rate your pain on average, where 0 means no pain, and 10 means worst imaginable pain? 6 4 5   Global Mental Health Score 8  12 13    Global Physical Health Score 14  12 15    PROMIS TOTAL - SUBSCORES 22  24 28        Patient-reported     [place] Suicide Severity Rating Scale (Lifetime/Recent)      4/4/2024    12:10 PM 2/12/2025    10:17 AM 2/16/2025    12:33 AM 2/16/2025     9:32 AM 2/16/2025     6:09 PM 2/16/2025    10:00 PM   [place]man Suicide Severity Rating (Lifetime/Recent)   Q1 Wished to be Dead (Past Month) 0-->no  0-->no 0-->no 0-->no 0-->no   Q2 Suicidal Thoughts (Past Month) 0-->no  0-->no 0-->no 0-->no 0-->no   Q6 Suicide Behavior (Lifetime) 0-->no  0-->no 0-->no 0-->no 0-->no   Level of Risk per Screen no risks indicated  no risks indicated no risks indicated no risks indicated no risks indicated   1. Wish to be Dead (Lifetime)  Y       Wish to be Dead Description (Lifetime)  \"I do not feel worthy\" \" I do not feel that I deserve to be here.\"       1. Wish to be Dead (Past 1 Month)  N       2. Non-Specific Active Suicidal Thoughts (Lifetime)  N       Most Severe Ideation Rating (Lifetime)  3       Description of Most Severe Ideation (Lifetime)  \"I am going to be in pain forever.\"       Most Severe Ideation Rating (Past 1 Month)  1       Frequency (Lifetime)  2       Frequency (Past 1 Month)  1       Duration (Lifetime)  1       Duration (Past 1 Month)  1       Controllability (Lifetime)  1       Controllability (Past 1 Month)  1       Deterrents (Lifetime)  1       Deterrents (Past 1 Month)  1       Reasons for Ideation (Lifetime)  5       Reasons for Ideation (Past 1 Month)  1       Actual Attempt (Lifetime)  N       Has subject engaged in non-suicidal self-injurious behavior? (Lifetime)  N       Interrupted Attempts (Lifetime)  N       Aborted or Self-Interrupted Attempt (Lifetime)  N       Preparatory Acts or Behavior (Lifetime)  N     "   Calculated C-SSRS Risk Score (Lifetime/Recent)  No Risk Indicated             ASSESSMENT: Current Emotional / Mental Status (status of significant symptoms):   Risk status (Self / Other harm or suicidal ideation)   Patient denies current fears or concerns for personal safety.   Patient denies current or recent suicidal ideation or behaviors.   Patient denies current or recent homicidal ideation or behaviors.   Patient denies current or recent self injurious behavior or ideation.   Patient denies other safety concerns.   Patient reports there has been no change in risk factors since their last session.     Patient reports there has been no change in protective factors since their last session.     Recommended that patient call 911 or go to the local ED should there be a change in any of these risk factors     Appearance:   Appropriate    Eye Contact:   Good    Psychomotor Behavior: Normal    Attitude:   Cooperative  Friendly Attentive   Orientation:   All   Speech    Rate / Production: Normal     Volume:  Normal    Mood:    Anxious  Fearful   Affect:    Appropriate    Thought Content:  Clear    Thought Form:  Coherent  Logical    Insight:    Good      Medication Review:   No changes to current psychiatric medication(s)     Medication Compliance:   Yes     Changes in Health Issues:   None reported     Chemical Use Review:   Substance Use: Chemical use reviewed, no active concerns identified      Tobacco Use: No current tobacco use.      Diagnosis:  1. Generalized anxiety disorder            Collateral Reports Completed:   Not Applicable    PLAN: (Patient Tasks / Therapist Tasks / Other)  Patient will continue practicing setting boundaries with co-workers.  Patient will continue documenting feelings  Patient will google a list of values that he will bring to next session  Patient will intentionally decide how he wants to spend weekend    Patient will continue working on these goals      Manjinder BRUNO Caro                                                          ______________________________________________________________________    Individual Treatment Plan    Patient's Name: Viet Beebe  YOB: 1985    Date of Creation: 2/26/2025  Date Treatment Plan Last Reviewed/Revised: 2/26/2025    DSM5 Diagnoses: 300.02 (F41.1) Generalized Anxiety Disorder  Psychosocial / Contextual Factors: Patient has stopped using alcohol and marijuana and has been experiencing an increase in anxiety.  PROMIS (reviewed every 90 days): PROMIS-10 Scores        2/9/2025    12:38 PM 2/12/2025    10:23 AM 2/21/2025    10:39 AM   PROMIS-10 Total Score w/o Sub Scores   PROMIS TOTAL - SUBSCORES 22  24 28        Patient-reported         Referral / Collaboration:  Referral to another professional/service is not indicated at this time..    Anticipated number of session for this episode of care:  20-30 sessions  Anticipation frequency of session: Weekly  Anticipated Duration of each session: 38-52 minutes  Treatment plan will be reviewed in 90 days or when goals have been changed.       MeasurableTreatment Goal(s) related to diagnosis / functional impairment(s)  Goal 1: Patient will like to manage his anxiety    I will know I've met my goal when my anxiety goes down.      Objective #A (Patient Action)    Patient will  set boundaries at work .  Status: New - Date: 2/26/2025      Intervention(s)  Therapist will teach about healthy boundaries.   .    Objective #B  Patient will  name and express his feelings .  Status: New - Date: 2/26/2025      Intervention(s)  Therapist will assign homework Patient will use emotions wheel .    Patient has reviewed and agreed to the above plan.      Manjinder BRUNO Caro  February 26, 2025

## 2025-03-19 ENCOUNTER — VIRTUAL VISIT (OUTPATIENT)
Dept: BEHAVIORAL HEALTH | Facility: CLINIC | Age: 40
End: 2025-03-19
Payer: COMMERCIAL

## 2025-03-19 DIAGNOSIS — F33.1 MODERATE EPISODE OF RECURRENT MAJOR DEPRESSIVE DISORDER (H): ICD-10-CM

## 2025-03-19 DIAGNOSIS — F41.1 GENERALIZED ANXIETY DISORDER: Primary | ICD-10-CM

## 2025-03-19 PROCEDURE — 90834 PSYTX W PT 45 MINUTES: CPT | Mod: 95

## 2025-03-19 NOTE — PROGRESS NOTES
[facility]                                     Progress Note    Patient Name: Viet Beebe  Date: 3/19/25           Service Type: Individual      Session Start Time: 10:03 am  Session End Time: 10:50 am     Session Length: 47 minutes    Session #: 6    Attendees: Client attended alone    Service Modality:  Video Visit:      Provider verified identity through the following two step process.  Patient provided:  Patient is known previously to provider    Telemedicine Visit: The patient's condition can be safely assessed and treated via synchronous audio and visual telemedicine encounter.      Reason for Telemedicine Visit: Services only offered telehealth    Originating Site (Patient Location): Patient's home    Distant Site (Provider Location): Provider Remote Setting- Home Office    Consent:  The patient/guardian has verbally consented to: the potential risks and benefits of telemedicine (video visit) versus in person care; bill my insurance or make self-payment for services provided; and responsibility for payment of non-covered services.     Patient would like the video invitation sent by:  Send to e-mail at: [email or web address]    Mode of Communication:  Video Conference [facility]    Distant Location (Provider):  Off-site    As the provider I attest to compliance with applicable laws and regulations related to telemedicine.    DATA  Interactive Complexity: No  Crisis: No        Progress Since Last Session (Related to Symptoms / Goals / Homework):   Symptoms: Improving Patient has been feeling less anxious and depressed.      Homework: Partially completed      Episode of Care Goals: Satisfactory progress - ACTION (Actively working towards change); Intervened by reinforcing change plan / affirming steps taken     Current / Ongoing Stressors and Concerns:   Pt reports ongoing stressors as navigating various relationships and roles in different areas of life. Pt shared events that took place  "since last session and processed emotions. Pt and provider discussed external and internal conflicts, what it means to be \"myself\" and exploring self expression.  Patient started exploring his values and beliefs.  He was able to select his top values and he reflected on how he is living them.  He noticed he is living them well.       Treatment Objective(s) Addressed in This Session:   use cognitive strategies identified in therapy to challenge anxious thoughts  identify two areas of life that you would like to have improved functioning       Intervention:   Interpersonal Therapy: Patient and therapist continued to build therapeutic relationship.  Motivational Interviewing: Asking questions about reasons for changing some habits  ACT: Patient started doing values work    Assessments completed prior to visit:  The following assessments were completed by patient for this visit:  PHQ9:       10/24/2022     9:01 AM 4/11/2023     7:08 AM 4/24/2023    11:11 AM 6/6/2024    11:42 AM 2/11/2025    11:51 AM 2/12/2025    10:28 AM 2/25/2025    10:58 AM   PHQ-9 SCORE   PHQ-9 Total Score MyChart 3 (Minimal depression) 6 (Mild depression) 6 (Mild depression) 9 (Mild depression) 10 (Moderate depression)  11 (Moderate depression)   PHQ-9 Total Score 3 6 6 9 10  13 11        Patient-reported     GAD7:       7/6/2021    12:11 PM 3/28/2022     9:22 AM 5/17/2022     3:34 PM 4/11/2023     7:09 AM 6/6/2024    11:43 AM 2/12/2025    10:28 AM 2/21/2025    10:35 AM   MONIK-7 SCORE   Total Score  6 (mild anxiety) 7 (mild anxiety) 6 (mild anxiety) 7 (mild anxiety)  18 (severe anxiety)   Total Score 6 6 7 6 7 18 18        Patient-reported     PROMIS 10-Global Health (all questions and answers displayed):       2/9/2025    12:38 PM 2/12/2025    10:23 AM 2/21/2025    10:39 AM   PROMIS 10   In general, would you say your health is: Fair  Good   In general, would you say your quality of life is: Good  Very good   In general, how would you rate your " physical health? Good  Very good   In general, how would you rate your mental health, including your mood and your ability to think? Poor  Good   In general, how would you rate your satisfaction with your social activities and relationships? Fair  Very good   In general, please rate how well you carry out your usual social activities and roles Very good  Excellent   To what extent are you able to carry out your everyday physical activities such as walking, climbing stairs, carrying groceries, or moving a chair? Completely  Completely   In the past 7 days, how often have you been bothered by emotional problems such as feeling anxious, depressed, or irritable? Often  Often   In the past 7 days, how would you rate your fatigue on average? Moderate  Moderate   In the past 7 days, how would you rate your pain on average, where 0 means no pain, and 10 means worst imaginable pain? 6  5   In general, would you say your health is: 2 3 3   In general, would you say your quality of life is: 3 3 4   In general, how would you rate your physical health? 3 3 4   In general, how would you rate your mental health, including your mood and your ability to think? 1 3 3   In general, how would you rate your satisfaction with your social activities and relationships? 2 4 4   In general, please rate how well you carry out your usual social activities and roles. (This includes activities at home, at work and in your community, and responsibilities as a parent, child, spouse, employee, friend, etc.) 4 3 5   To what extent are you able to carry out your everyday physical activities such as walking, climbing stairs, carrying groceries, or moving a chair? 5 4 5   In the past 7 days, how often have you been bothered by emotional problems such as feeling anxious, depressed, or irritable? 4 4 4   In the past 7 days, how would you rate your fatigue on average? 3 4 3   In the past 7 days, how would you rate your pain on average, where 0 means no  "pain, and 10 means worst imaginable pain? 6 4 5   Global Mental Health Score 8  12 13    Global Physical Health Score 14  12 15    PROMIS TOTAL - SUBSCORES 22  24 28        Patient-reported     [place] Suicide Severity Rating Scale (Lifetime/Recent)      4/4/2024    12:10 PM 2/12/2025    10:17 AM 2/16/2025    12:33 AM 2/16/2025     9:32 AM 2/16/2025     6:09 PM 2/16/2025    10:00 PM   [place]olls Suicide Severity Rating (Lifetime/Recent)   Q1 Wished to be Dead (Past Month) 0-->no  0-->no 0-->no 0-->no 0-->no   Q2 Suicidal Thoughts (Past Month) 0-->no  0-->no 0-->no 0-->no 0-->no   Q6 Suicide Behavior (Lifetime) 0-->no  0-->no 0-->no 0-->no 0-->no   Level of Risk per Screen no risks indicated  no risks indicated no risks indicated no risks indicated no risks indicated   1. Wish to be Dead (Lifetime)  Y       Wish to be Dead Description (Lifetime)  \"I do not feel worthy\" \" I do not feel that I deserve to be here.\"       1. Wish to be Dead (Past 1 Month)  N       2. Non-Specific Active Suicidal Thoughts (Lifetime)  N       Most Severe Ideation Rating (Lifetime)  3       Description of Most Severe Ideation (Lifetime)  \"I am going to be in pain forever.\"       Most Severe Ideation Rating (Past 1 Month)  1       Frequency (Lifetime)  2       Frequency (Past 1 Month)  1       Duration (Lifetime)  1       Duration (Past 1 Month)  1       Controllability (Lifetime)  1       Controllability (Past 1 Month)  1       Deterrents (Lifetime)  1       Deterrents (Past 1 Month)  1       Reasons for Ideation (Lifetime)  5       Reasons for Ideation (Past 1 Month)  1       Actual Attempt (Lifetime)  N       Has subject engaged in non-suicidal self-injurious behavior? (Lifetime)  N       Interrupted Attempts (Lifetime)  N       Aborted or Self-Interrupted Attempt (Lifetime)  N       Preparatory Acts or Behavior (Lifetime)  N       Calculated C-SSRS Risk Score (Lifetime/Recent)  No Risk Indicated             ASSESSMENT: Current Emotional / " Mental Status (status of significant symptoms):   Risk status (Self / Other harm or suicidal ideation)   Patient denies current fears or concerns for personal safety.   Patient denies current or recent suicidal ideation or behaviors.   Patient denies current or recent homicidal ideation or behaviors.   Patient denies current or recent self injurious behavior or ideation.   Patient denies other safety concerns.   Patient reports there has been no change in risk factors since their last session.     Patient reports there has been no change in protective factors since their last session.     Recommended that patient call 911 or go to the local ED should there be a change in any of these risk factors     Appearance:   Appropriate    Eye Contact:   Good    Psychomotor Behavior: Normal    Attitude:   Cooperative  Friendly Attentive   Orientation:   All   Speech    Rate / Production: Normal     Volume:  Normal    Mood:    Anxious  Fearful   Affect:    Appropriate    Thought Content:  Clear    Thought Form:  Coherent  Logical    Insight:    Good      Medication Review:   No changes to current psychiatric medication(s)     Medication Compliance:   Yes     Changes in Health Issues:   None reported     Chemical Use Review:   Substance Use: Chemical use reviewed, no active concerns identified      Tobacco Use: No current tobacco use.      Diagnosis:  1. Generalized anxiety disorder    2. Moderate episode of recurrent major depressive disorder (H)              Collateral Reports Completed:   Not Applicable    PLAN: (Patient Tasks / Therapist Tasks / Other)  Patient will continue practicing setting boundaries with co-workers.  Patient will continue documenting feelings  Patient will continue reflecting how he lives his values and he will bring to next session  Patient will intentionally decide how he wants to spend weekend    Patient is making progress and will continue working on these goals.      Manjinder BRUNO Caro                                                          ______________________________________________________________________    Individual Treatment Plan    Patient's Name: Viet Beebe  YOB: 1985    Date of Creation: 2/26/2025  Date Treatment Plan Last Reviewed/Revised: 2/26/2025    DSM5 Diagnoses: 300.02 (F41.1) Generalized Anxiety Disorder  Psychosocial / Contextual Factors: Patient has stopped using alcohol and marijuana and has been experiencing an increase in anxiety.  PROMIS (reviewed every 90 days): PROMIS-10 Scores        2/9/2025    12:38 PM 2/12/2025    10:23 AM 2/21/2025    10:39 AM   PROMIS-10 Total Score w/o Sub Scores   PROMIS TOTAL - SUBSCORES 22  24 28        Patient-reported         Referral / Collaboration:  Referral to another professional/service is not indicated at this time..    Anticipated number of session for this episode of care:  20-30 sessions  Anticipation frequency of session: Weekly  Anticipated Duration of each session: 38-52 minutes  Treatment plan will be reviewed in 90 days or when goals have been changed.       MeasurableTreatment Goal(s) related to diagnosis / functional impairment(s)  Goal 1: Patient will like to manage his anxiety    I will know I've met my goal when my anxiety goes down.      Objective #A (Patient Action)    Patient will  set boundaries at work .  Status: New - Date: 2/26/2025      Intervention(s)  Therapist will teach about healthy boundaries.   .    Objective #B  Patient will  name and express his feelings .  Status: New - Date: 2/26/2025      Intervention(s)  Therapist will assign homework Patient will use emotions wheel .    Patient has reviewed and agreed to the above plan.      Manjinder BRUNO Caro  February 26, 2025

## 2025-03-26 ENCOUNTER — VIRTUAL VISIT (OUTPATIENT)
Dept: BEHAVIORAL HEALTH | Facility: CLINIC | Age: 40
End: 2025-03-26
Payer: COMMERCIAL

## 2025-03-26 DIAGNOSIS — F33.1 MODERATE EPISODE OF RECURRENT MAJOR DEPRESSIVE DISORDER (H): ICD-10-CM

## 2025-03-26 DIAGNOSIS — F41.1 GENERALIZED ANXIETY DISORDER: Primary | ICD-10-CM

## 2025-03-26 PROCEDURE — 90834 PSYTX W PT 45 MINUTES: CPT | Mod: 95

## 2025-03-26 NOTE — PROGRESS NOTES
[facility]                                     Progress Note    Patient Name: Viet Beebe  Date: 3/26/25           Service Type: Individual      Session Start Time: 10:06 am  Session End Time: 10:55 am     Session Length: 49 minutes    Session #: 7    Attendees: Client attended alone    Service Modality:  Video Visit:      Provider verified identity through the following two step process.  Patient provided:  Patient is known previously to provider    Telemedicine Visit: The patient's condition can be safely assessed and treated via synchronous audio and visual telemedicine encounter.      Reason for Telemedicine Visit: Services only offered telehealth    Originating Site (Patient Location): Patient's home    Distant Site (Provider Location): Provider Remote Setting- Home Office    Consent:  The patient/guardian has verbally consented to: the potential risks and benefits of telemedicine (video visit) versus in person care; bill my insurance or make self-payment for services provided; and responsibility for payment of non-covered services.     Patient would like the video invitation sent by:  Send to e-mail at: [email or web address]    Mode of Communication:  [facility]    Distant Location (Provider):  Off-site    As the provider I attest to compliance with applicable laws and regulations related to telemedicine.    DATA  Interactive Complexity: No  Crisis: No        Progress Since Last Session (Related to Symptoms / Goals / Homework):   Symptoms: Improving Patient has been feeling better, and sleeping better.      Homework: Achieved / completed to satisfaction      Episode of Care Goals: Satisfactory progress - ACTION (Actively working towards change); Intervened by reinforcing change plan / affirming steps taken     Current / Ongoing Stressors and Concerns:   Pt reports ongoing stressors as navigating various relationships and roles in different areas of life. Pt shared events  that took place since last session and processed emotions. Pt and provider discussed external and internal conflicts.  Patient shared about his dating history and explored the possibility of dating again.     Treatment Objective(s) Addressed in This Session:   use thought-stopping strategy daily to reduce intrusive thoughts  identify and write down 2 positive experiences a day, bring to therapy to share with therapist       Intervention:   Interpersonal Therapy: Patient and therapist continued to build therapeutic relationship.  Motivational Interviewing: Asking questions about reasons for changing some habits  ACT: Patient continued doing values work    Assessments completed prior to visit:  The following assessments were completed by patient for this visit:  PHQ9:       10/24/2022     9:01 AM 4/11/2023     7:08 AM 4/24/2023    11:11 AM 6/6/2024    11:42 AM 2/11/2025    11:51 AM 2/12/2025    10:28 AM 2/25/2025    10:58 AM   PHQ-9 SCORE   PHQ-9 Total Score MyChart 3 (Minimal depression) 6 (Mild depression) 6 (Mild depression) 9 (Mild depression) 10 (Moderate depression)  11 (Moderate depression)   PHQ-9 Total Score 3 6 6 9 10  13 11        Patient-reported     GAD7:       7/6/2021    12:11 PM 3/28/2022     9:22 AM 5/17/2022     3:34 PM 4/11/2023     7:09 AM 6/6/2024    11:43 AM 2/12/2025    10:28 AM 2/21/2025    10:35 AM   MONIK-7 SCORE   Total Score  6 (mild anxiety) 7 (mild anxiety) 6 (mild anxiety) 7 (mild anxiety)  18 (severe anxiety)   Total Score 6 6 7 6 7 18 18        Patient-reported     PROMIS 10-Global Health (all questions and answers displayed):       2/9/2025    12:38 PM 2/12/2025    10:23 AM 2/21/2025    10:39 AM   PROMIS 10   In general, would you say your health is: Fair  Good   In general, would you say your quality of life is: Good  Very good   In general, how would you rate your physical health? Good  Very good   In general, how would you rate your mental health, including your mood and your ability  to think? Poor  Good   In general, how would you rate your satisfaction with your social activities and relationships? Fair  Very good   In general, please rate how well you carry out your usual social activities and roles Very good  Excellent   To what extent are you able to carry out your everyday physical activities such as walking, climbing stairs, carrying groceries, or moving a chair? Completely  Completely   In the past 7 days, how often have you been bothered by emotional problems such as feeling anxious, depressed, or irritable? Often  Often   In the past 7 days, how would you rate your fatigue on average? Moderate  Moderate   In the past 7 days, how would you rate your pain on average, where 0 means no pain, and 10 means worst imaginable pain? 6  5   In general, would you say your health is: 2 3 3   In general, would you say your quality of life is: 3 3 4   In general, how would you rate your physical health? 3 3 4   In general, how would you rate your mental health, including your mood and your ability to think? 1 3 3   In general, how would you rate your satisfaction with your social activities and relationships? 2 4 4   In general, please rate how well you carry out your usual social activities and roles. (This includes activities at home, at work and in your community, and responsibilities as a parent, child, spouse, employee, friend, etc.) 4 3 5   To what extent are you able to carry out your everyday physical activities such as walking, climbing stairs, carrying groceries, or moving a chair? 5 4 5   In the past 7 days, how often have you been bothered by emotional problems such as feeling anxious, depressed, or irritable? 4 4 4   In the past 7 days, how would you rate your fatigue on average? 3 4 3   In the past 7 days, how would you rate your pain on average, where 0 means no pain, and 10 means worst imaginable pain? 6 4 5   Global Mental Health Score 8  12 13    Global Physical Health Score 14  12  "15    PROMIS TOTAL - SUBSCORES 22  24 28        Patient-reported     [place] Suicide Severity Rating Scale (Lifetime/Recent)      4/4/2024    12:10 PM 2/12/2025    10:17 AM 2/16/2025    12:33 AM 2/16/2025     9:32 AM 2/16/2025     6:09 PM 2/16/2025    10:00 PM   [place] Suicide Severity Rating (Lifetime/Recent)   Q1 Wished to be Dead (Past Month) 0-->no  0-->no 0-->no 0-->no 0-->no   Q2 Suicidal Thoughts (Past Month) 0-->no  0-->no 0-->no 0-->no 0-->no   Q6 Suicide Behavior (Lifetime) 0-->no  0-->no 0-->no 0-->no 0-->no   Level of Risk per Screen no risks indicated  no risks indicated no risks indicated no risks indicated no risks indicated   1. Wish to be Dead (Lifetime)  Y       Wish to be Dead Description (Lifetime)  \"I do not feel worthy\" \" I do not feel that I deserve to be here.\"       1. Wish to be Dead (Past 1 Month)  N       2. Non-Specific Active Suicidal Thoughts (Lifetime)  N       Most Severe Ideation Rating (Lifetime)  3       Description of Most Severe Ideation (Lifetime)  \"I am going to be in pain forever.\"       Most Severe Ideation Rating (Past 1 Month)  1       Frequency (Lifetime)  2       Frequency (Past 1 Month)  1       Duration (Lifetime)  1       Duration (Past 1 Month)  1       Controllability (Lifetime)  1       Controllability (Past 1 Month)  1       Deterrents (Lifetime)  1       Deterrents (Past 1 Month)  1       Reasons for Ideation (Lifetime)  5       Reasons for Ideation (Past 1 Month)  1       Actual Attempt (Lifetime)  N       Has subject engaged in non-suicidal self-injurious behavior? (Lifetime)  N       Interrupted Attempts (Lifetime)  N       Aborted or Self-Interrupted Attempt (Lifetime)  N       Preparatory Acts or Behavior (Lifetime)  N       Calculated C-SSRS Risk Score (Lifetime/Recent)  No Risk Indicated             ASSESSMENT: Current Emotional / Mental Status (status of significant symptoms):   Risk status (Self / Other harm or suicidal ideation)   Patient denies " current fears or concerns for personal safety.   Patient denies current or recent suicidal ideation or behaviors.   Patient denies current or recent homicidal ideation or behaviors.   Patient denies current or recent self injurious behavior or ideation.   Patient denies other safety concerns.   Patient reports there has been no change in risk factors since their last session.     Patient reports there has been no change in protective factors since their last session.     Recommended that patient call 911 or go to the local ED should there be a change in any of these risk factors     Appearance:   Appropriate    Eye Contact:   Good    Psychomotor Behavior: Normal    Attitude:   Cooperative  Friendly Attentive   Orientation:   All   Speech    Rate / Production: Normal     Volume:  Normal    Mood:    Anxious  Fearful   Affect:    Appropriate    Thought Content:  Clear    Thought Form:  Coherent  Logical    Insight:    Good      Medication Review:   No changes to current psychiatric medication(s)     Medication Compliance:   Yes     Changes in Health Issues:   None reported     Chemical Use Review:   Substance Use: Chemical use reviewed, no active concerns identified      Tobacco Use: No current tobacco use.      Diagnosis:  1. Generalized anxiety disorder    2. Moderate episode of recurrent major depressive disorder (H)                Collateral Reports Completed:   Not Applicable    PLAN: (Patient Tasks / Therapist Tasks / Other)  Patient will explore new activities such as [employer], [employer].  Patient will continue documenting feelings  Patient will continue reflecting how he lives his values and he will bring to next session  Patient will intentionally decide how he wants to spend weekend    Patient is making good progress and will continue working on these goals.      Manjinder BRUNO Caro                                                          ______________________________________________________________________    Individual Treatment Plan    Patient's Name: Viet Beebe  YOB: 1985    Date of Creation: 2/26/2025  Date Treatment Plan Last Reviewed/Revised: 2/26/2025    DSM5 Diagnoses: 300.02 (F41.1) Generalized Anxiety Disorder  Psychosocial / Contextual Factors: Patient has stopped using alcohol and marijuana and has been experiencing an increase in anxiety.  PROMIS (reviewed every 90 days): PROMIS-10 Scores        2/9/2025    12:38 PM 2/12/2025    10:23 AM 2/21/2025    10:39 AM   PROMIS-10 Total Score w/o Sub Scores   PROMIS TOTAL - SUBSCORES 22  24 28        Patient-reported         Referral / Collaboration:  Referral to another professional/service is not indicated at this time..    Anticipated number of session for this episode of care:  20-30 sessions  Anticipation frequency of session: Weekly  Anticipated Duration of each session: 38-52 minutes  Treatment plan will be reviewed in 90 days or when goals have been changed.       MeasurableTreatment Goal(s) related to diagnosis / functional impairment(s)  Goal 1: Patient will like to manage his anxiety    I will know I've met my goal when my anxiety goes down.      Objective #A (Patient Action)    Patient will  set boundaries at work .  Status: New - Date: 2/26/2025      Intervention(s)  Therapist will teach about healthy boundaries.   .    Objective #B  Patient will  name and express his feelings .  Status: New - Date: 2/26/2025      Intervention(s)  Therapist will assign homework Patient will use emotions wheel .    Patient has reviewed and agreed to the above plan.      Manjinder BRUNO Caro  February 26, 2025

## 2025-04-03 ENCOUNTER — VIRTUAL VISIT (OUTPATIENT)
Dept: PSYCHOLOGY | Facility: CLINIC | Age: 40
End: 2025-04-03
Payer: COMMERCIAL

## 2025-04-03 DIAGNOSIS — F33.1 MODERATE EPISODE OF RECURRENT MAJOR DEPRESSIVE DISORDER (H): Primary | ICD-10-CM

## 2025-04-03 DIAGNOSIS — F41.1 GENERALIZED ANXIETY DISORDER: ICD-10-CM

## 2025-04-03 PROCEDURE — 90834 PSYTX W PT 45 MINUTES: CPT | Mod: 95

## 2025-04-03 ASSESSMENT — PATIENT HEALTH QUESTIONNAIRE - PHQ9
SUM OF ALL RESPONSES TO PHQ QUESTIONS 1-9: 9
10. IF YOU CHECKED OFF ANY PROBLEMS, HOW DIFFICULT HAVE THESE PROBLEMS MADE IT FOR YOU TO DO YOUR WORK, TAKE CARE OF THINGS AT HOME, OR GET ALONG WITH OTHER PEOPLE: SOMEWHAT DIFFICULT
SUM OF ALL RESPONSES TO PHQ QUESTIONS 1-9: 9

## 2025-04-03 NOTE — PROGRESS NOTES
[facility]                                     Progress Note    Patient Name: Viet Beebe  Date: 4/03/25           Service Type: Individual      Session Start Time: 10:01 am  Session End Time: 10:46 am     Session Length: 45 minutes    Session #: 8    Attendees: Client attended alone    Service Modality:  Video Visit:      Provider verified identity through the following two step process.  Patient provided:  Patient is known previously to provider    Telemedicine Visit: The patient's condition can be safely assessed and treated via synchronous audio and visual telemedicine encounter.      Reason for Telemedicine Visit: Services only offered telehealth    Originating Site (Patient Location): Patient's home    Distant Site (Provider Location): [facility]    Consent:  The patient/guardian has verbally consented to: the potential risks and benefits of telemedicine (video visit) versus in person care; bill my insurance or make self-payment for services provided; and responsibility for payment of non-covered services.     Patient would like the video invitation sent by:  Send to e-mail at: [email or web address]    Mode of Communication:  Video Conference via [facility]    Distant Location (Provider):  On-site    As the provider I attest to compliance with applicable laws and regulations related to telemedicine.    DATA  Interactive Complexity: No  Crisis: No        Progress Since Last Session (Related to Symptoms / Goals / Homework):   Symptoms: Improving Patient has been feeling better, mood is stable.      Homework: Achieved / completed to satisfaction      Episode of Care Goals: Satisfactory progress - ACTION (Actively working towards change); Intervened by reinforcing change plan / affirming steps taken     Current / Ongoing Stressors and Concerns:   Pt reports ongoing stressors as navigating various relationships and roles in different areas of life. Pt shared events that took  place since last session and processed emotions. Pt and provider discussed external and internal conflicts.  Patient indicated he has become very anxious about the news and worked through the reasons why he watches news.  He also reflected on how to change this habit in order to be successful.     Treatment Objective(s) Addressed in This Session:   use cognitive strategies identified in therapy to challenge anxious thoughts  identify and write down 2 positive experiences a day, bring to therapy to share with therapist       Intervention:   Interpersonal Therapy: Patient and therapist continued to build therapeutic relationship.  Motivational Interviewing: Asking questions about reasons for changing some habits  ACT: Patient continued doing values work    Assessments completed prior to visit:  The following assessments were completed by patient for this visit:  PHQ9:       4/11/2023     7:08 AM 4/24/2023    11:11 AM 6/6/2024    11:42 AM 2/11/2025    11:51 AM 2/12/2025    10:28 AM 2/25/2025    10:58 AM 4/3/2025     9:37 AM   PHQ-9 SCORE   PHQ-9 Total Score MyChart 6 (Mild depression) 6 (Mild depression) 9 (Mild depression) 10 (Moderate depression)  11 (Moderate depression) 9 (Mild depression)   PHQ-9 Total Score 6 6 9 10  13 11  9        Patient-reported     GAD7:       7/6/2021    12:11 PM 3/28/2022     9:22 AM 5/17/2022     3:34 PM 4/11/2023     7:09 AM 6/6/2024    11:43 AM 2/12/2025    10:28 AM 2/21/2025    10:35 AM   MONIK-7 SCORE   Total Score  6 (mild anxiety) 7 (mild anxiety) 6 (mild anxiety) 7 (mild anxiety)  18 (severe anxiety)   Total Score 6 6 7 6 7 18 18        Patient-reported     PROMIS 10-Global Health (all questions and answers displayed):       2/9/2025    12:38 PM 2/12/2025    10:23 AM 2/21/2025    10:39 AM   PROMIS 10   In general, would you say your health is: Fair  Good   In general, would you say your quality of life is: Good  Very good   In general, how would you rate your physical health? Good   Very good   In general, how would you rate your mental health, including your mood and your ability to think? Poor  Good   In general, how would you rate your satisfaction with your social activities and relationships? Fair  Very good   In general, please rate how well you carry out your usual social activities and roles Very good  Excellent   To what extent are you able to carry out your everyday physical activities such as walking, climbing stairs, carrying groceries, or moving a chair? Completely  Completely   In the past 7 days, how often have you been bothered by emotional problems such as feeling anxious, depressed, or irritable? Often  Often   In the past 7 days, how would you rate your fatigue on average? Moderate  Moderate   In the past 7 days, how would you rate your pain on average, where 0 means no pain, and 10 means worst imaginable pain? 6  5   In general, would you say your health is: 2 3 3   In general, would you say your quality of life is: 3 3 4   In general, how would you rate your physical health? 3 3 4   In general, how would you rate your mental health, including your mood and your ability to think? 1 3 3   In general, how would you rate your satisfaction with your social activities and relationships? 2 4 4   In general, please rate how well you carry out your usual social activities and roles. (This includes activities at home, at work and in your community, and responsibilities as a parent, child, spouse, employee, friend, etc.) 4 3 5   To what extent are you able to carry out your everyday physical activities such as walking, climbing stairs, carrying groceries, or moving a chair? 5 4 5   In the past 7 days, how often have you been bothered by emotional problems such as feeling anxious, depressed, or irritable? 4 4 4   In the past 7 days, how would you rate your fatigue on average? 3 4 3   In the past 7 days, how would you rate your pain on average, where 0 means no pain, and 10 means worst  "imaginable pain? 6 4 5   Global Mental Health Score 8  12 13    Global Physical Health Score 14  12 15    PROMIS TOTAL - SUBSCORES 22  24 28        Patient-reported     [place] Suicide Severity Rating Scale (Lifetime/Recent)      4/4/2024    12:10 PM 2/12/2025    10:17 AM 2/16/2025    12:33 AM 2/16/2025     9:32 AM 2/16/2025     6:09 PM 2/16/2025    10:00 PM   [place] Suicide Severity Rating (Lifetime/Recent)   Q1 Wished to be Dead (Past Month) 0-->no  0-->no 0-->no 0-->no 0-->no   Q2 Suicidal Thoughts (Past Month) 0-->no  0-->no 0-->no 0-->no 0-->no   Q6 Suicide Behavior (Lifetime) 0-->no  0-->no 0-->no 0-->no 0-->no   Level of Risk per Screen no risks indicated  no risks indicated no risks indicated no risks indicated no risks indicated   1. Wish to be Dead (Lifetime)  Y       Wish to be Dead Description (Lifetime)  \"I do not feel worthy\" \" I do not feel that I deserve to be here.\"       1. Wish to be Dead (Past 1 Month)  N       2. Non-Specific Active Suicidal Thoughts (Lifetime)  N       Most Severe Ideation Rating (Lifetime)  3       Description of Most Severe Ideation (Lifetime)  \"I am going to be in pain forever.\"       Most Severe Ideation Rating (Past 1 Month)  1       Frequency (Lifetime)  2       Frequency (Past 1 Month)  1       Duration (Lifetime)  1       Duration (Past 1 Month)  1       Controllability (Lifetime)  1       Controllability (Past 1 Month)  1       Deterrents (Lifetime)  1       Deterrents (Past 1 Month)  1       Reasons for Ideation (Lifetime)  5       Reasons for Ideation (Past 1 Month)  1       Actual Attempt (Lifetime)  N       Has subject engaged in non-suicidal self-injurious behavior? (Lifetime)  N       Interrupted Attempts (Lifetime)  N       Aborted or Self-Interrupted Attempt (Lifetime)  N       Preparatory Acts or Behavior (Lifetime)  N       Calculated C-SSRS Risk Score (Lifetime/Recent)  No Risk Indicated             ASSESSMENT: Current Emotional / Mental Status (status of " significant symptoms):   Risk status (Self / Other harm or suicidal ideation)   Patient denies current fears or concerns for personal safety.   Patient denies current or recent suicidal ideation or behaviors.   Patient denies current or recent homicidal ideation or behaviors.   Patient denies current or recent self injurious behavior or ideation.   Patient denies other safety concerns.   Patient reports there has been no change in risk factors since their last session.     Patient reports there has been no change in protective factors since their last session.     Recommended that patient call 911 or go to the local ED should there be a change in any of these risk factors     Appearance:   Appropriate    Eye Contact:   Good    Psychomotor Behavior: Normal    Attitude:   Cooperative  Friendly Attentive   Orientation:   All   Speech    Rate / Production: Normal     Volume:  Normal    Mood:    Anxious  Fearful   Affect:    Appropriate    Thought Content:  Clear    Thought Form:  Coherent  Logical    Insight:    Good      Medication Review:   No changes to current psychiatric medication(s)     Medication Compliance:   Yes     Changes in Health Issues:   None reported     Chemical Use Review:   Substance Use: Chemical use reviewed, no active concerns identified      Tobacco Use: No current tobacco use.      Diagnosis:  1. Moderate episode of recurrent major depressive disorder (H)    2. Generalized anxiety disorder                  Collateral Reports Completed:   Not Applicable    PLAN: (Patient Tasks / Therapist Tasks / Other)  Patient will explore new activities such as [employer], [employer].  Patient will continue documenting feelings  Patient will continue reflecting how he lives his values and he will bring to next session  Patient will intentionally decide how he wants to spend weekend    Patient is making good progress and will continue working on these goals.      Manjinder BRUNO Caro                                                          ______________________________________________________________________    Individual Treatment Plan    Patient's Name: Viet Beebe  YOB: 1985    Date of Creation: 2/26/2025  Date Treatment Plan Last Reviewed/Revised: 2/26/2025    DSM5 Diagnoses: 300.02 (F41.1) Generalized Anxiety Disorder  Psychosocial / Contextual Factors: Patient has stopped using alcohol and marijuana and has been experiencing an increase in anxiety.  PROMIS (reviewed every 90 days): PROMIS-10 Scores        2/9/2025    12:38 PM 2/12/2025    10:23 AM 2/21/2025    10:39 AM   PROMIS-10 Total Score w/o Sub Scores   PROMIS TOTAL - SUBSCORES 22  24 28        Patient-reported         Referral / Collaboration:  Referral to another professional/service is not indicated at this time..    Anticipated number of session for this episode of care:  20-30 sessions  Anticipation frequency of session: Weekly  Anticipated Duration of each session: 38-52 minutes  Treatment plan will be reviewed in 90 days or when goals have been changed.       MeasurableTreatment Goal(s) related to diagnosis / functional impairment(s)  Goal 1: Patient will like to manage his anxiety    I will know I've met my goal when my anxiety goes down.      Objective #A (Patient Action)    Patient will  set boundaries at work .  Status: New - Date: 2/26/2025      Intervention(s)  Therapist will teach about healthy boundaries.   .    Objective #B  Patient will  name and express his feelings .  Status: New - Date: 2/26/2025      Intervention(s)  Therapist will assign homework Patient will use emotions wheel .    Patient has reviewed and agreed to the above plan.      Manjinder BRUNO Caro  February 26, 2025

## 2025-04-07 DIAGNOSIS — F32.A ANXIETY AND DEPRESSION: ICD-10-CM

## 2025-04-07 DIAGNOSIS — F41.9 ANXIETY AND DEPRESSION: ICD-10-CM

## 2025-04-07 DIAGNOSIS — I10 HYPERTENSION GOAL BP (BLOOD PRESSURE) < 130/80: ICD-10-CM

## 2025-04-07 RX ORDER — SERTRALINE HYDROCHLORIDE 100 MG/1
TABLET, FILM COATED ORAL
Qty: 135 TABLET | Refills: 3 | OUTPATIENT
Start: 2025-04-07

## 2025-04-07 RX ORDER — LISINOPRIL AND HYDROCHLOROTHIAZIDE 20; 25 MG/1; MG/1
1 TABLET ORAL DAILY
Qty: 90 TABLET | Refills: 3 | OUTPATIENT
Start: 2025-04-07

## 2025-04-08 ASSESSMENT — PATIENT HEALTH QUESTIONNAIRE - PHQ9
SUM OF ALL RESPONSES TO PHQ QUESTIONS 1-9: 8
10. IF YOU CHECKED OFF ANY PROBLEMS, HOW DIFFICULT HAVE THESE PROBLEMS MADE IT FOR YOU TO DO YOUR WORK, TAKE CARE OF THINGS AT HOME, OR GET ALONG WITH OTHER PEOPLE: SOMEWHAT DIFFICULT
SUM OF ALL RESPONSES TO PHQ QUESTIONS 1-9: 8

## 2025-04-09 ENCOUNTER — VIRTUAL VISIT (OUTPATIENT)
Dept: PSYCHOLOGY | Facility: CLINIC | Age: 40
End: 2025-04-09
Payer: COMMERCIAL

## 2025-04-09 DIAGNOSIS — F33.1 MODERATE EPISODE OF RECURRENT MAJOR DEPRESSIVE DISORDER (H): Primary | ICD-10-CM

## 2025-04-09 DIAGNOSIS — F41.1 GENERALIZED ANXIETY DISORDER: ICD-10-CM

## 2025-04-09 NOTE — PROGRESS NOTES
[facility]                                     Progress Note    Patient Name: Viet Beebe  Date: 4/09/25           Service Type: Individual      Session Start Time: 10:02 am  Session End Time: 10:50 am     Session Length: 48 minutes    Session #: 9    Attendees: Client attended alone    Service Modality:  Video Visit:      Provider verified identity through the following two step process.  Patient provided:  Patient is known previously to provider    Telemedicine Visit: The patient's condition can be safely assessed and treated via synchronous audio and visual telemedicine encounter.      Reason for Telemedicine Visit: Services only offered telehealth    Originating Site (Patient Location): Patient's home    Distant Site (Provider Location): [facility]    Consent:  The patient/guardian has verbally consented to: the potential risks and benefits of telemedicine (video visit) versus in person care; bill my insurance or make self-payment for services provided; and responsibility for payment of non-covered services.     Patient would like the video invitation sent by:  Send to e-mail at: [email or web address]    Mode of Communication:  Video Conference via [facility]    Distant Location (Provider):  On-site    As the provider I attest to compliance with applicable laws and regulations related to telemedicine.    DATA  Interactive Complexity: No  Crisis: No        Progress Since Last Session (Related to Symptoms / Goals / Homework):   Symptoms: Improving Patient has been feeling better, mood is stable.      Homework: Completed in session      Episode of Care Goals: Satisfactory progress - ACTION (Actively working towards change); Intervened by reinforcing change plan / affirming steps taken     Current / Ongoing Stressors and Concerns:   Pt reports ongoing stressors as navigating various relationships and roles in different areas of life. Pt shared events that took place since  last session and processed emotions. Pt continued to work through values.  He noticed that he lives his values externally focusing on others (family, work, and friends).  However, he does not invest on self and his own spirituality and self-development.    Treatment Objective(s) Addressed in This Session:   Values work to align personality and identity to values  identify 5 traits or charcteristics you like about yourself       Intervention:   Interpersonal Therapy: Patient and therapist continued to build therapeutic relationship.  Motivational Interviewing: Asking questions about reasons for changing some habits  ACT: Patient continued doing values work    Assessments completed prior to visit:  The following assessments were completed by patient for this visit:  PHQ9:       4/24/2023    11:11 AM 6/6/2024    11:42 AM 2/11/2025    11:51 AM 2/12/2025    10:28 AM 2/25/2025    10:58 AM 4/3/2025     9:37 AM 4/8/2025    11:29 AM   PHQ-9 SCORE   PHQ-9 Total Score MyChart 6 (Mild depression) 9 (Mild depression) 10 (Moderate depression)  11 (Moderate depression) 9 (Mild depression) 8 (Mild depression)   PHQ-9 Total Score 6 9 10  13 11  9  8        Patient-reported     GAD7:       7/6/2021    12:11 PM 3/28/2022     9:22 AM 5/17/2022     3:34 PM 4/11/2023     7:09 AM 6/6/2024    11:43 AM 2/12/2025    10:28 AM 2/21/2025    10:35 AM   MONIK-7 SCORE   Total Score  6 (mild anxiety) 7 (mild anxiety) 6 (mild anxiety) 7 (mild anxiety)  18 (severe anxiety)   Total Score 6 6 7 6 7 18 18        Patient-reported     PROMIS 10-Global Health (all questions and answers displayed):       2/9/2025    12:38 PM 2/12/2025    10:23 AM 2/21/2025    10:39 AM   PROMIS 10   In general, would you say your health is: Fair  Good   In general, would you say your quality of life is: Good  Very good   In general, how would you rate your physical health? Good  Very good   In general, how would you rate your mental health, including your mood and your ability  to think? Poor  Good   In general, how would you rate your satisfaction with your social activities and relationships? Fair  Very good   In general, please rate how well you carry out your usual social activities and roles Very good  Excellent   To what extent are you able to carry out your everyday physical activities such as walking, climbing stairs, carrying groceries, or moving a chair? Completely  Completely   In the past 7 days, how often have you been bothered by emotional problems such as feeling anxious, depressed, or irritable? Often  Often   In the past 7 days, how would you rate your fatigue on average? Moderate  Moderate   In the past 7 days, how would you rate your pain on average, where 0 means no pain, and 10 means worst imaginable pain? 6  5   In general, would you say your health is: 2 3 3   In general, would you say your quality of life is: 3 3 4   In general, how would you rate your physical health? 3 3 4   In general, how would you rate your mental health, including your mood and your ability to think? 1 3 3   In general, how would you rate your satisfaction with your social activities and relationships? 2 4 4   In general, please rate how well you carry out your usual social activities and roles. (This includes activities at home, at work and in your community, and responsibilities as a parent, child, spouse, employee, friend, etc.) 4 3 5   To what extent are you able to carry out your everyday physical activities such as walking, climbing stairs, carrying groceries, or moving a chair? 5 4 5   In the past 7 days, how often have you been bothered by emotional problems such as feeling anxious, depressed, or irritable? 4 4 4   In the past 7 days, how would you rate your fatigue on average? 3 4 3   In the past 7 days, how would you rate your pain on average, where 0 means no pain, and 10 means worst imaginable pain? 6 4 5   Global Mental Health Score 8  12 13    Global Physical Health Score 14  12  "15    PROMIS TOTAL - SUBSCORES 22  24 28        Patient-reported     [place] Suicide Severity Rating Scale (Lifetime/Recent)      4/4/2024    12:10 PM 2/12/2025    10:17 AM 2/16/2025    12:33 AM 2/16/2025     9:32 AM 2/16/2025     6:09 PM 2/16/2025    10:00 PM   [place] Suicide Severity Rating (Lifetime/Recent)   Q1 Wished to be Dead (Past Month) 0-->no  0-->no 0-->no 0-->no 0-->no   Q2 Suicidal Thoughts (Past Month) 0-->no  0-->no 0-->no 0-->no 0-->no   Q6 Suicide Behavior (Lifetime) 0-->no  0-->no 0-->no 0-->no 0-->no   Level of Risk per Screen no risks indicated  no risks indicated no risks indicated no risks indicated no risks indicated   1. Wish to be Dead (Lifetime)  Y       Wish to be Dead Description (Lifetime)  \"I do not feel worthy\" \" I do not feel that I deserve to be here.\"       1. Wish to be Dead (Past 1 Month)  N       2. Non-Specific Active Suicidal Thoughts (Lifetime)  N       Most Severe Ideation Rating (Lifetime)  3       Description of Most Severe Ideation (Lifetime)  \"I am going to be in pain forever.\"       Most Severe Ideation Rating (Past 1 Month)  1       Frequency (Lifetime)  2       Frequency (Past 1 Month)  1       Duration (Lifetime)  1       Duration (Past 1 Month)  1       Controllability (Lifetime)  1       Controllability (Past 1 Month)  1       Deterrents (Lifetime)  1       Deterrents (Past 1 Month)  1       Reasons for Ideation (Lifetime)  5       Reasons for Ideation (Past 1 Month)  1       Actual Attempt (Lifetime)  N       Has subject engaged in non-suicidal self-injurious behavior? (Lifetime)  N       Interrupted Attempts (Lifetime)  N       Aborted or Self-Interrupted Attempt (Lifetime)  N       Preparatory Acts or Behavior (Lifetime)  N       Calculated C-SSRS Risk Score (Lifetime/Recent)  No Risk Indicated             ASSESSMENT: Current Emotional / Mental Status (status of significant symptoms):   Risk status (Self / Other harm or suicidal ideation)   Patient denies " current fears or concerns for personal safety.   Patient denies current or recent suicidal ideation or behaviors.   Patient denies current or recent homicidal ideation or behaviors.   Patient denies current or recent self injurious behavior or ideation.   Patient denies other safety concerns.   Patient reports there has been no change in risk factors since their last session.     Patient reports there has been no change in protective factors since their last session.     Recommended that patient call 911 or go to the local ED should there be a change in any of these risk factors     Appearance:   Appropriate    Eye Contact:   Good    Psychomotor Behavior: Normal    Attitude:   Cooperative  Friendly Attentive   Orientation:   All   Speech    Rate / Production: Normal     Volume:  Normal    Mood:    Anxious  Fearful   Affect:    Appropriate    Thought Content:  Clear    Thought Form:  Coherent  Logical    Insight:    Good      Medication Review:   No changes to current psychiatric medication(s)     Medication Compliance:   Yes     Changes in Health Issues:   None reported     Chemical Use Review:   Substance Use: Chemical use reviewed, no active concerns identified      Tobacco Use: No current tobacco use.      Diagnosis:  1. Moderate episode of recurrent major depressive disorder (H)    2. Generalized anxiety disorder                    Collateral Reports Completed:   Not Applicable    PLAN: (Patient Tasks / Therapist Tasks / Other)    Patient is making good progress and has continued working on these goals.  Patient will continue to reflect on how to develop self.      Manjinder BRUNO Caro                                                         ______________________________________________________________________    Individual Treatment Plan    Patient's Name: Viet Beebe  YOB: 1985    Date of Creation: 2/26/2025  Date Treatment Plan Last Reviewed/Revised: 2/26/2025    DSM5 Diagnoses: 300.02 (F41.1)  Generalized Anxiety Disorder  Psychosocial / Contextual Factors: Patient has stopped using alcohol and marijuana and has been experiencing an increase in anxiety.  PROMIS (reviewed every 90 days): PROMIS-10 Scores        2/9/2025    12:38 PM 2/12/2025    10:23 AM 2/21/2025    10:39 AM   PROMIS-10 Total Score w/o Sub Scores   PROMIS TOTAL - SUBSCORES 22  24 28        Patient-reported         Referral / Collaboration:  Referral to another professional/service is not indicated at this time..    Anticipated number of session for this episode of care:  20-30 sessions  Anticipation frequency of session: Weekly  Anticipated Duration of each session: 38-52 minutes  Treatment plan will be reviewed in 90 days or when goals have been changed.       MeasurableTreatment Goal(s) related to diagnosis / functional impairment(s)  Goal 1: Patient will like to manage his anxiety    I will know I've met my goal when my anxiety goes down.      Objective #A (Patient Action)    Patient will  set boundaries at work .  Status: New - Date: 2/26/2025      Intervention(s)  Therapist will teach about healthy boundaries.   .    Objective #B  Patient will  name and express his feelings .  Status: New - Date: 2/26/2025      Intervention(s)  Therapist will assign homework Patient will use emotions wheel .    Patient has reviewed and agreed to the above plan.      Manjinder BRUNO Caro  February 26, 2025

## 2025-04-13 ENCOUNTER — MYC REFILL (OUTPATIENT)
Dept: FAMILY MEDICINE | Facility: CLINIC | Age: 40
End: 2025-04-13
Payer: COMMERCIAL

## 2025-04-13 DIAGNOSIS — K21.9 GASTROESOPHAGEAL REFLUX DISEASE WITHOUT ESOPHAGITIS: ICD-10-CM

## 2025-04-14 RX ORDER — OMEPRAZOLE 20 MG/1
CAPSULE, DELAYED RELEASE ORAL
Qty: 90 CAPSULE | Refills: 2 | Status: SHIPPED | OUTPATIENT
Start: 2025-04-14

## 2025-04-16 ENCOUNTER — VIRTUAL VISIT (OUTPATIENT)
Dept: PSYCHOLOGY | Facility: CLINIC | Age: 40
End: 2025-04-16
Payer: COMMERCIAL

## 2025-04-16 DIAGNOSIS — F33.1 MODERATE EPISODE OF RECURRENT MAJOR DEPRESSIVE DISORDER (H): ICD-10-CM

## 2025-04-16 DIAGNOSIS — F41.1 GENERALIZED ANXIETY DISORDER: Primary | ICD-10-CM

## 2025-04-16 PROCEDURE — 90834 PSYTX W PT 45 MINUTES: CPT | Mod: 95

## 2025-04-16 NOTE — PROGRESS NOTES
[facility]                                     Progress Note    Patient Name: Viet Beebe  Date: 4/16/25           Service Type: Individual      Session Start Time: 10:03 am  Session End Time: 10:48 am     Session Length: 45 minutes    Session #: 10    Attendees: Client attended alone    Service Modality:  Video Visit:      Provider verified identity through the following two step process.  Patient provided:  Patient is known previously to provider    Telemedicine Visit: The patient's condition can be safely assessed and treated via synchronous audio and visual telemedicine encounter.      Reason for Telemedicine Visit: Services only offered telehealth    Originating Site (Patient Location): Patient's home    Distant Site (Provider Location): [facility]    Consent:  The patient/guardian has verbally consented to: the potential risks and benefits of telemedicine (video visit) versus in person care; bill my insurance or make self-payment for services provided; and responsibility for payment of non-covered services.     Patient would like the video invitation sent by:  Send to e-mail at: [email or web address]    Mode of Communication:  Video Conference via [facility]    Distant Location (Provider):  On-site    As the provider I attest to compliance with applicable laws and regulations related to telemedicine.    DATA  Interactive Complexity: No  Crisis: No        Progress Since Last Session (Related to Symptoms / Goals / Homework):   Symptoms: Improving Patient has been feeling better, mood is stable.      Homework: Achieved / completed to satisfaction      Episode of Care Goals: Satisfactory progress - ACTION (Actively working towards change); Intervened by reinforcing change plan / affirming steps taken     Current / Ongoing Stressors and Concerns:   Pt reports ongoing stressors as navigating various relationships and roles in different areas of life. Pt shared events that  took place since last session and processed emotions. Pt continued to work through values.  This week patient tried on a couple of new activities and noticed how well it felt to self-express and do what he wanted.    Treatment Objective(s) Addressed in This Session:   Values work to align personality and identity to values, identify 5 traits or charcteristics you like about yourself       Intervention:   Interpersonal Therapy: Patient and therapist continued to build therapeutic relationship.  Motivational Interviewing: Asking questions about reasons for changing some habits  ACT: Patient continued doing values work    Assessments completed prior to visit:  The following assessments were completed by patient for this visit:  PHQ9:       4/24/2023    11:11 AM 6/6/2024    11:42 AM 2/11/2025    11:51 AM 2/12/2025    10:28 AM 2/25/2025    10:58 AM 4/3/2025     9:37 AM 4/8/2025    11:29 AM   PHQ-9 SCORE   PHQ-9 Total Score MyChart 6 (Mild depression) 9 (Mild depression) 10 (Moderate depression)  11 (Moderate depression) 9 (Mild depression) 8 (Mild depression)   PHQ-9 Total Score 6 9 10  13 11  9  8        Patient-reported     GAD7:       7/6/2021    12:11 PM 3/28/2022     9:22 AM 5/17/2022     3:34 PM 4/11/2023     7:09 AM 6/6/2024    11:43 AM 2/12/2025    10:28 AM 2/21/2025    10:35 AM   MONIK-7 SCORE   Total Score  6 (mild anxiety) 7 (mild anxiety) 6 (mild anxiety) 7 (mild anxiety)  18 (severe anxiety)   Total Score 6 6 7 6 7 18 18        Patient-reported     PROMIS 10-Global Health (all questions and answers displayed):       2/9/2025    12:38 PM 2/12/2025    10:23 AM 2/21/2025    10:39 AM   PROMIS 10   In general, would you say your health is: Fair  Good   In general, would you say your quality of life is: Good  Very good   In general, how would you rate your physical health? Good  Very good   In general, how would you rate your mental health, including your mood and your ability to think? Poor  Good   In general, how  would you rate your satisfaction with your social activities and relationships? Fair  Very good   In general, please rate how well you carry out your usual social activities and roles Very good  Excellent   To what extent are you able to carry out your everyday physical activities such as walking, climbing stairs, carrying groceries, or moving a chair? Completely  Completely   In the past 7 days, how often have you been bothered by emotional problems such as feeling anxious, depressed, or irritable? Often  Often   In the past 7 days, how would you rate your fatigue on average? Moderate  Moderate   In the past 7 days, how would you rate your pain on average, where 0 means no pain, and 10 means worst imaginable pain? 6  5   In general, would you say your health is: 2 3 3   In general, would you say your quality of life is: 3 3 4   In general, how would you rate your physical health? 3 3 4   In general, how would you rate your mental health, including your mood and your ability to think? 1 3 3   In general, how would you rate your satisfaction with your social activities and relationships? 2 4 4   In general, please rate how well you carry out your usual social activities and roles. (This includes activities at home, at work and in your community, and responsibilities as a parent, child, spouse, employee, friend, etc.) 4 3 5   To what extent are you able to carry out your everyday physical activities such as walking, climbing stairs, carrying groceries, or moving a chair? 5 4 5   In the past 7 days, how often have you been bothered by emotional problems such as feeling anxious, depressed, or irritable? 4 4 4   In the past 7 days, how would you rate your fatigue on average? 3 4 3   In the past 7 days, how would you rate your pain on average, where 0 means no pain, and 10 means worst imaginable pain? 6 4 5   Global Mental Health Score 8  12 13    Global Physical Health Score 14  12 15    PROMIS TOTAL - SUBSCORES 22  24  "28        Patient-reported     [place] Suicide Severity Rating Scale (Lifetime/Recent)      4/4/2024    12:10 PM 2/12/2025    10:17 AM 2/16/2025    12:33 AM 2/16/2025     9:32 AM 2/16/2025     6:09 PM 2/16/2025    10:00 PM   [place] Suicide Severity Rating (Lifetime/Recent)   Q1 Wished to be Dead (Past Month) 0-->no  0-->no 0-->no 0-->no 0-->no   Q2 Suicidal Thoughts (Past Month) 0-->no  0-->no 0-->no 0-->no 0-->no   Q6 Suicide Behavior (Lifetime) 0-->no  0-->no 0-->no 0-->no 0-->no   Level of Risk per Screen no risks indicated  no risks indicated no risks indicated no risks indicated no risks indicated   1. Wish to be Dead (Lifetime)  Y       Wish to be Dead Description (Lifetime)  \"I do not feel worthy\" \" I do not feel that I deserve to be here.\"       1. Wish to be Dead (Past 1 Month)  N       2. Non-Specific Active Suicidal Thoughts (Lifetime)  N       Most Severe Ideation Rating (Lifetime)  3       Description of Most Severe Ideation (Lifetime)  \"I am going to be in pain forever.\"       Most Severe Ideation Rating (Past 1 Month)  1       Frequency (Lifetime)  2       Frequency (Past 1 Month)  1       Duration (Lifetime)  1       Duration (Past 1 Month)  1       Controllability (Lifetime)  1       Controllability (Past 1 Month)  1       Deterrents (Lifetime)  1       Deterrents (Past 1 Month)  1       Reasons for Ideation (Lifetime)  5       Reasons for Ideation (Past 1 Month)  1       Actual Attempt (Lifetime)  N       Has subject engaged in non-suicidal self-injurious behavior? (Lifetime)  N       Interrupted Attempts (Lifetime)  N       Aborted or Self-Interrupted Attempt (Lifetime)  N       Preparatory Acts or Behavior (Lifetime)  N       Calculated C-SSRS Risk Score (Lifetime/Recent)  No Risk Indicated             ASSESSMENT: Current Emotional / Mental Status (status of significant symptoms):   Risk status (Self / Other harm or suicidal ideation)   Patient denies current fears or concerns for personal " safety.   Patient denies current or recent suicidal ideation or behaviors.   Patient denies current or recent homicidal ideation or behaviors.   Patient denies current or recent self injurious behavior or ideation.   Patient denies other safety concerns.   Patient reports there has been no change in risk factors since their last session.     Patient reports there has been no change in protective factors since their last session.     Recommended that patient call 911 or go to the local ED should there be a change in any of these risk factors     Appearance:   Appropriate    Eye Contact:   Good    Psychomotor Behavior: Normal    Attitude:   Cooperative  Friendly Attentive   Orientation:   All   Speech    Rate / Production: Normal     Volume:  Normal    Mood:    Anxious  Fearful   Affect:    Appropriate    Thought Content:  Clear    Thought Form:  Coherent  Logical    Insight:    Good      Medication Review:   No changes to current psychiatric medication(s)     Medication Compliance:   Yes     Changes in Health Issues:   None reported     Chemical Use Review:   Substance Use: Chemical use reviewed, no active concerns identified      Tobacco Use: No current tobacco use.      Diagnosis:  1. Generalized anxiety disorder    2. Moderate episode of recurrent major depressive disorder (H)                      Collateral Reports Completed:   Not Applicable    PLAN: (Patient Tasks / Therapist Tasks / Other)    Patient is making good progress and has continued working on these goals.  Patient will continue to reflect on how to develop self.      Manjinder BRUNO Caro                                                         ______________________________________________________________________    Individual Treatment Plan    Patient's Name: Viet Beebe  YOB: 1985    Date of Creation: 2/26/2025  Date Treatment Plan Last Reviewed/Revised: 2/26/2025    DSM5 Diagnoses: 300.02 (F41.1) Generalized Anxiety  Disorder  Psychosocial / Contextual Factors: Patient has stopped using alcohol and marijuana and has been experiencing an increase in anxiety.  PROMIS (reviewed every 90 days): PROMIS-10 Scores        2/9/2025    12:38 PM 2/12/2025    10:23 AM 2/21/2025    10:39 AM   PROMIS-10 Total Score w/o Sub Scores   PROMIS TOTAL - SUBSCORES 22  24 28        Patient-reported         Referral / Collaboration:  Referral to another professional/service is not indicated at this time..    Anticipated number of session for this episode of care:  20-30 sessions  Anticipation frequency of session: Weekly  Anticipated Duration of each session: 38-52 minutes  Treatment plan will be reviewed in 90 days or when goals have been changed.       MeasurableTreatment Goal(s) related to diagnosis / functional impairment(s)  Goal 1: Patient will like to manage his anxiety    I will know I've met my goal when my anxiety goes down.      Objective #A (Patient Action)    Patient will  set boundaries at work .  Status: New - Date: 2/26/2025      Intervention(s)  Therapist will teach about healthy boundaries.   .    Objective #B  Patient will  name and express his feelings .  Status: New - Date: 2/26/2025      Intervention(s)  Therapist will assign homework Patient will use emotions wheel .    Patient has reviewed and agreed to the above plan.      Manjinder BRUNO Caro  February 26, 2025

## 2025-04-23 ENCOUNTER — VIRTUAL VISIT (OUTPATIENT)
Dept: PSYCHOLOGY | Facility: CLINIC | Age: 40
End: 2025-04-23
Payer: COMMERCIAL

## 2025-04-23 DIAGNOSIS — F33.1 MODERATE EPISODE OF RECURRENT MAJOR DEPRESSIVE DISORDER (H): ICD-10-CM

## 2025-04-23 DIAGNOSIS — F41.1 GENERALIZED ANXIETY DISORDER: Primary | ICD-10-CM

## 2025-04-23 PROCEDURE — 90834 PSYTX W PT 45 MINUTES: CPT | Mod: 95

## 2025-04-23 NOTE — PROGRESS NOTES
[facility]                                     Progress Note    Patient Name: Viet Beebe  Date: 4/23/25           Service Type: Individual      Session Start Time: 10:03 am  Session End Time: 10:46 am     Session Length: 43 minutes    Session #: 11    Attendees: Client attended alone    Service Modality:  Video Visit:      Provider verified identity through the following two step process.  Patient provided:  Patient is known previously to provider    Telemedicine Visit: The patient's condition can be safely assessed and treated via synchronous audio and visual telemedicine encounter.      Reason for Telemedicine Visit: Services only offered telehealth    Originating Site (Patient Location): Patient's home    Distant Site (Provider Location): Provider Remote Setting- Home Office    Consent:  The patient/guardian has verbally consented to: the potential risks and benefits of telemedicine (video visit) versus in person care; bill my insurance or make self-payment for services provided; and responsibility for payment of non-covered services.     Patient would like the video invitation sent by:  Send to e-mail at: [email or web address]    Mode of Communication:  [facility]    Distant Location (Provider):  Off-site    As the provider I attest to compliance with applicable laws and regulations related to telemedicine.    DATA  Interactive Complexity: No  Crisis: No        Progress Since Last Session (Related to Symptoms / Goals / Homework):   Symptoms: Improving Patient has been feeling better, mood is stable.      Homework: Achieved / completed to satisfaction      Episode of Care Goals: Satisfactory progress - ACTION (Actively working towards change); Intervened by reinforcing change plan / affirming steps taken     Current / Ongoing Stressors and Concerns:   Pt reports ongoing stressors as navigating various relationships and roles in different areas of life. Pt shared events that  took place since last session and processed emotions. Patient worked through ways to improve his sleep routine.  He is having trouble falling asleep.      Treatment Objective(s) Addressed in This Session:   use relaxation strategies 2 times per day to reduce the physical symptoms of anxiety. Work on sleeping routine.       Intervention:   Motivational Interviewing: Asking questions about reasons for changing some habits connected to his sleep routine.    Assessments completed prior to visit:  The following assessments were completed by patient for this visit:  PHQ9:       4/24/2023    11:11 AM 6/6/2024    11:42 AM 2/11/2025    11:51 AM 2/12/2025    10:28 AM 2/25/2025    10:58 AM 4/3/2025     9:37 AM 4/8/2025    11:29 AM   PHQ-9 SCORE   PHQ-9 Total Score MyChart 6 (Mild depression) 9 (Mild depression) 10 (Moderate depression)  11 (Moderate depression) 9 (Mild depression) 8 (Mild depression)   PHQ-9 Total Score 6 9 10  13 11  9  8        Patient-reported     GAD7:       7/6/2021    12:11 PM 3/28/2022     9:22 AM 5/17/2022     3:34 PM 4/11/2023     7:09 AM 6/6/2024    11:43 AM 2/12/2025    10:28 AM 2/21/2025    10:35 AM   MONIK-7 SCORE   Total Score  6 (mild anxiety) 7 (mild anxiety) 6 (mild anxiety) 7 (mild anxiety)  18 (severe anxiety)   Total Score 6 6 7 6 7 18 18        Patient-reported     PROMIS 10-Global Health (all questions and answers displayed):       2/9/2025    12:38 PM 2/12/2025    10:23 AM 2/21/2025    10:39 AM   PROMIS 10   In general, would you say your health is: Fair  Good   In general, would you say your quality of life is: Good  Very good   In general, how would you rate your physical health? Good  Very good   In general, how would you rate your mental health, including your mood and your ability to think? Poor  Good   In general, how would you rate your satisfaction with your social activities and relationships? Fair  Very good   In general, please rate how well you carry out your usual social  activities and roles Very good  Excellent   To what extent are you able to carry out your everyday physical activities such as walking, climbing stairs, carrying groceries, or moving a chair? Completely  Completely   In the past 7 days, how often have you been bothered by emotional problems such as feeling anxious, depressed, or irritable? Often  Often   In the past 7 days, how would you rate your fatigue on average? Moderate  Moderate   In the past 7 days, how would you rate your pain on average, where 0 means no pain, and 10 means worst imaginable pain? 6  5   In general, would you say your health is: 2 3 3   In general, would you say your quality of life is: 3 3 4   In general, how would you rate your physical health? 3 3 4   In general, how would you rate your mental health, including your mood and your ability to think? 1 3 3   In general, how would you rate your satisfaction with your social activities and relationships? 2 4 4   In general, please rate how well you carry out your usual social activities and roles. (This includes activities at home, at work and in your community, and responsibilities as a parent, child, spouse, employee, friend, etc.) 4 3 5   To what extent are you able to carry out your everyday physical activities such as walking, climbing stairs, carrying groceries, or moving a chair? 5 4 5   In the past 7 days, how often have you been bothered by emotional problems such as feeling anxious, depressed, or irritable? 4 4 4   In the past 7 days, how would you rate your fatigue on average? 3 4 3   In the past 7 days, how would you rate your pain on average, where 0 means no pain, and 10 means worst imaginable pain? 6 4 5   Global Mental Health Score 8  12 13    Global Physical Health Score 14  12 15    PROMIS TOTAL - SUBSCORES 22  24 28        Patient-reported     [place] Suicide Severity Rating Scale (Lifetime/Recent)      4/4/2024    12:10 PM 2/12/2025    10:17 AM 2/16/2025    12:33 AM  "2/16/2025     9:32 AM 2/16/2025     6:09 PM 2/16/2025    10:00 PM   [place] Suicide Severity Rating (Lifetime/Recent)   Q1 Wished to be Dead (Past Month) 0-->no  0-->no 0-->no 0-->no 0-->no   Q2 Suicidal Thoughts (Past Month) 0-->no  0-->no 0-->no 0-->no 0-->no   Q6 Suicide Behavior (Lifetime) 0-->no  0-->no 0-->no 0-->no 0-->no   Level of Risk per Screen no risks indicated  no risks indicated no risks indicated no risks indicated no risks indicated   1. Wish to be Dead (Lifetime)  Y       Wish to be Dead Description (Lifetime)  \"I do not feel worthy\" \" I do not feel that I deserve to be here.\"       1. Wish to be Dead (Past 1 Month)  N       2. Non-Specific Active Suicidal Thoughts (Lifetime)  N       Most Severe Ideation Rating (Lifetime)  3       Description of Most Severe Ideation (Lifetime)  \"I am going to be in pain forever.\"       Most Severe Ideation Rating (Past 1 Month)  1       Frequency (Lifetime)  2       Frequency (Past 1 Month)  1       Duration (Lifetime)  1       Duration (Past 1 Month)  1       Controllability (Lifetime)  1       Controllability (Past 1 Month)  1       Deterrents (Lifetime)  1       Deterrents (Past 1 Month)  1       Reasons for Ideation (Lifetime)  5       Reasons for Ideation (Past 1 Month)  1       Actual Attempt (Lifetime)  N       Has subject engaged in non-suicidal self-injurious behavior? (Lifetime)  N       Interrupted Attempts (Lifetime)  N       Aborted or Self-Interrupted Attempt (Lifetime)  N       Preparatory Acts or Behavior (Lifetime)  N       Calculated C-SSRS Risk Score (Lifetime/Recent)  No Risk Indicated             ASSESSMENT: Current Emotional / Mental Status (status of significant symptoms):   Risk status (Self / Other harm or suicidal ideation)   Patient denies current fears or concerns for personal safety.   Patient denies current or recent suicidal ideation or behaviors.   Patient denies current or recent homicidal ideation or behaviors.   Patient denies " current or recent self injurious behavior or ideation.   Patient denies other safety concerns.   Patient reports there has been no change in risk factors since their last session.     Patient reports there has been no change in protective factors since their last session.     Recommended that patient call 911 or go to the local ED should there be a change in any of these risk factors     Appearance:   Appropriate    Eye Contact:   Good    Psychomotor Behavior: Normal    Attitude:   Cooperative  Friendly Attentive   Orientation:   All   Speech    Rate / Production: Normal     Volume:  Normal    Mood:    Anxious  Fearful   Affect:    Appropriate    Thought Content:  Clear    Thought Form:  Coherent  Logical    Insight:    Good      Medication Review:   No changes to current psychiatric medication(s)     Medication Compliance:   Yes     Changes in Health Issues:   None reported     Chemical Use Review:   Substance Use: Chemical use reviewed, no active concerns identified      Tobacco Use: No current tobacco use.      Diagnosis:  1. Generalized anxiety disorder    2. Moderate episode of recurrent major depressive disorder (H)                        Collateral Reports Completed:   Not Applicable    PLAN: (Patient Tasks / Therapist Tasks / Other)    Patient is making good progress and has continued working on these goals.  Patient will continue to reflect on how to develop self.  Patient will make some changes to sleep routine.      Manjinder BRUNO Caro                                                         ______________________________________________________________________    Individual Treatment Plan    Patient's Name: Viet Beebe  YOB: 1985    Date of Creation: 2/26/2025  Date Treatment Plan Last Reviewed/Revised: 2/26/2025    DSM5 Diagnoses: 300.02 (F41.1) Generalized Anxiety Disorder  Psychosocial / Contextual Factors: Patient has stopped using alcohol and marijuana and has been experiencing an  increase in anxiety.  PROMIS (reviewed every 90 days): PROMIS-10 Scores        2/9/2025    12:38 PM 2/12/2025    10:23 AM 2/21/2025    10:39 AM   PROMIS-10 Total Score w/o Sub Scores   PROMIS TOTAL - SUBSCORES 22  24 28        Patient-reported         Referral / Collaboration:  Referral to another professional/service is not indicated at this time..    Anticipated number of session for this episode of care:  20-30 sessions  Anticipation frequency of session: Weekly  Anticipated Duration of each session: 38-52 minutes  Treatment plan will be reviewed in 90 days or when goals have been changed.       MeasurableTreatment Goal(s) related to diagnosis / functional impairment(s)  Goal 1: Patient will like to manage his anxiety    I will know I've met my goal when my anxiety goes down.      Objective #A (Patient Action)    Patient will  set boundaries at work .  Status: New - Date: 2/26/2025      Intervention(s)  Therapist will teach about healthy boundaries.   .    Objective #B  Patient will  name and express his feelings .  Status: New - Date: 2/26/2025      Intervention(s)  Therapist will assign homework Patient will use emotions wheel .    Patient has reviewed and agreed to the above plan.      Manjinder BRUNO Caro  February 26, 2025

## 2025-04-30 ENCOUNTER — VIRTUAL VISIT (OUTPATIENT)
Dept: PSYCHOLOGY | Facility: CLINIC | Age: 40
End: 2025-04-30
Payer: COMMERCIAL

## 2025-04-30 DIAGNOSIS — F33.1 MODERATE EPISODE OF RECURRENT MAJOR DEPRESSIVE DISORDER (H): ICD-10-CM

## 2025-04-30 DIAGNOSIS — F41.1 GENERALIZED ANXIETY DISORDER: Primary | ICD-10-CM

## 2025-04-30 PROCEDURE — 90834 PSYTX W PT 45 MINUTES: CPT | Mod: 95

## 2025-04-30 NOTE — PROGRESS NOTES
[facility]                                     Progress Note    Patient Name: Viet Beebe  Date: 4/30/25           Service Type: Individual      Session Start Time: 10:06 am  Session End Time: 10:46 am     Session Length: 40 minutes    Session #: 12    Attendees: Client attended alone    Service Modality:  Video Visit:      Provider verified identity through the following two step process.  Patient provided:  Patient is known previously to provider    Telemedicine Visit: The patient's condition can be safely assessed and treated via synchronous audio and visual telemedicine encounter.      Reason for Telemedicine Visit: Services only offered telehealth    Originating Site (Patient Location): Patient's home    Distant Site (Provider Location): Provider Remote Setting- Home Office    Consent:  The patient/guardian has verbally consented to: the potential risks and benefits of telemedicine (video visit) versus in person care; bill my insurance or make self-payment for services provided; and responsibility for payment of non-covered services.     Patient would like the video invitation sent by:  Send to e-mail at: [email or web address]    Mode of Communication:  [facility]    Distant Location (Provider):  Off-site    As the provider I attest to compliance with applicable laws and regulations related to telemedicine.    DATA  Interactive Complexity: No  Crisis: No        Progress Since Last Session (Related to Symptoms / Goals / Homework):   Symptoms: Improving Patient has been feeling better, mood is stable.      Homework: Achieved / completed to satisfaction      Episode of Care Goals: Satisfactory progress - ACTION (Actively working towards change); Intervened by reinforcing change plan / affirming steps taken     Current / Ongoing Stressors and Concerns:   Pt reports ongoing stressors as navigating various relationships and roles in different areas of life. Pt shared events that  took place since last session and processed emotions. Patient reported that he made some changes to his sleep routine and he has been sleeping much better, and feels less tired and more energized during the day.  He talked about other possible changes he would like to make in the future.  He shared some concern about setting boundaries with coworkers.     Treatment Objective(s) Addressed in This Session:   Setting boundaries .Continue to work on sleep routine.       Intervention:   Motivational Interviewing: Asking questions about reasons for changing some habits connected to his sleep routine.    Assessments completed prior to visit:  The following assessments were completed by patient for this visit:  PHQ9:       4/24/2023    11:11 AM 6/6/2024    11:42 AM 2/11/2025    11:51 AM 2/12/2025    10:28 AM 2/25/2025    10:58 AM 4/3/2025     9:37 AM 4/8/2025    11:29 AM   PHQ-9 SCORE   PHQ-9 Total Score MyChart 6 (Mild depression) 9 (Mild depression) 10 (Moderate depression)  11 (Moderate depression) 9 (Mild depression) 8 (Mild depression)   PHQ-9 Total Score 6 9 10  13 11  9  8        Patient-reported     GAD7:       7/6/2021    12:11 PM 3/28/2022     9:22 AM 5/17/2022     3:34 PM 4/11/2023     7:09 AM 6/6/2024    11:43 AM 2/12/2025    10:28 AM 2/21/2025    10:35 AM   MONIK-7 SCORE   Total Score  6 (mild anxiety) 7 (mild anxiety) 6 (mild anxiety) 7 (mild anxiety)  18 (severe anxiety)   Total Score 6 6 7 6 7 18 18        Patient-reported     PROMIS 10-Global Health (all questions and answers displayed):       2/9/2025    12:38 PM 2/12/2025    10:23 AM 2/21/2025    10:39 AM   PROMIS 10   In general, would you say your health is: Fair  Good   In general, would you say your quality of life is: Good  Very good   In general, how would you rate your physical health? Good  Very good   In general, how would you rate your mental health, including your mood and your ability to think? Poor  Good   In general, how would you rate your  satisfaction with your social activities and relationships? Fair  Very good   In general, please rate how well you carry out your usual social activities and roles Very good  Excellent   To what extent are you able to carry out your everyday physical activities such as walking, climbing stairs, carrying groceries, or moving a chair? Completely  Completely   In the past 7 days, how often have you been bothered by emotional problems such as feeling anxious, depressed, or irritable? Often  Often   In the past 7 days, how would you rate your fatigue on average? Moderate  Moderate   In the past 7 days, how would you rate your pain on average, where 0 means no pain, and 10 means worst imaginable pain? 6  5   In general, would you say your health is: 2 3 3   In general, would you say your quality of life is: 3 3 4   In general, how would you rate your physical health? 3 3 4   In general, how would you rate your mental health, including your mood and your ability to think? 1 3 3   In general, how would you rate your satisfaction with your social activities and relationships? 2 4 4   In general, please rate how well you carry out your usual social activities and roles. (This includes activities at home, at work and in your community, and responsibilities as a parent, child, spouse, employee, friend, etc.) 4 3 5   To what extent are you able to carry out your everyday physical activities such as walking, climbing stairs, carrying groceries, or moving a chair? 5 4 5   In the past 7 days, how often have you been bothered by emotional problems such as feeling anxious, depressed, or irritable? 4 4 4   In the past 7 days, how would you rate your fatigue on average? 3 4 3   In the past 7 days, how would you rate your pain on average, where 0 means no pain, and 10 means worst imaginable pain? 6 4 5   Global Mental Health Score 8  12 13    Global Physical Health Score 14  12 15    PROMIS TOTAL - SUBSCORES 22  24 28         "Patient-reported     [place] Suicide Severity Rating Scale (Lifetime/Recent)      4/4/2024    12:10 PM 2/12/2025    10:17 AM 2/16/2025    12:33 AM 2/16/2025     9:32 AM 2/16/2025     6:09 PM 2/16/2025    10:00 PM   [place] Suicide Severity Rating (Lifetime/Recent)   Q1 Wished to be Dead (Past Month) 0-->no  0-->no 0-->no 0-->no 0-->no   Q2 Suicidal Thoughts (Past Month) 0-->no  0-->no 0-->no 0-->no 0-->no   Q6 Suicide Behavior (Lifetime) 0-->no  0-->no 0-->no 0-->no 0-->no   Level of Risk per Screen no risks indicated  no risks indicated no risks indicated no risks indicated no risks indicated   1. Wish to be Dead (Lifetime)  Y       Wish to be Dead Description (Lifetime)  \"I do not feel worthy\" \" I do not feel that I deserve to be here.\"       1. Wish to be Dead (Past 1 Month)  N       2. Non-Specific Active Suicidal Thoughts (Lifetime)  N       Most Severe Ideation Rating (Lifetime)  3       Description of Most Severe Ideation (Lifetime)  \"I am going to be in pain forever.\"       Most Severe Ideation Rating (Past 1 Month)  1       Frequency (Lifetime)  2       Frequency (Past 1 Month)  1       Duration (Lifetime)  1       Duration (Past 1 Month)  1       Controllability (Lifetime)  1       Controllability (Past 1 Month)  1       Deterrents (Lifetime)  1       Deterrents (Past 1 Month)  1       Reasons for Ideation (Lifetime)  5       Reasons for Ideation (Past 1 Month)  1       Actual Attempt (Lifetime)  N       Has subject engaged in non-suicidal self-injurious behavior? (Lifetime)  N       Interrupted Attempts (Lifetime)  N       Aborted or Self-Interrupted Attempt (Lifetime)  N       Preparatory Acts or Behavior (Lifetime)  N       Calculated C-SSRS Risk Score (Lifetime/Recent)  No Risk Indicated             ASSESSMENT: Current Emotional / Mental Status (status of significant symptoms):   Risk status (Self / Other harm or suicidal ideation)   Patient denies current fears or concerns for personal " safety.   Patient denies current or recent suicidal ideation or behaviors.   Patient denies current or recent homicidal ideation or behaviors.   Patient denies current or recent self injurious behavior or ideation.   Patient denies other safety concerns.   Patient reports there has been no change in risk factors since their last session.     Patient reports there has been no change in protective factors since their last session.     Recommended that patient call 911 or go to the local ED should there be a change in any of these risk factors     Appearance:   Appropriate    Eye Contact:   Good    Psychomotor Behavior: Normal    Attitude:   Cooperative  Friendly Attentive   Orientation:   All   Speech    Rate / Production: Normal     Volume:  Normal    Mood:    Anxious  Fearful   Affect:    Appropriate    Thought Content:  Clear    Thought Form:  Coherent  Logical    Insight:    Good      Medication Review:   No changes to current psychiatric medication(s)     Medication Compliance:   Yes     Changes in Health Issues:   None reported     Chemical Use Review:   Substance Use: Chemical use reviewed, no active concerns identified      Tobacco Use: No current tobacco use.      Diagnosis:  1. Generalized anxiety disorder    2. Moderate episode of recurrent major depressive disorder (H)                          Collateral Reports Completed:   Not Applicable    PLAN: (Patient Tasks / Therapist Tasks / Other)    Patient is making good progress and has continued working on these goals.  Patient will continue to reflect on how to develop self.  Patient will make some changes to sleep routine.      Manjinder BRUNO Caro                                                         ______________________________________________________________________    Individual Treatment Plan    Patient's Name: Viet Beebe  YOB: 1985    Date of Creation: 2/26/2025  Date Treatment Plan Last Reviewed/Revised: 2/26/2025    DSM5 Diagnoses:  300.02 (F41.1) Generalized Anxiety Disorder  Psychosocial / Contextual Factors: Patient has stopped using alcohol and marijuana and has been experiencing an increase in anxiety.  PROMIS (reviewed every 90 days): PROMIS-10 Scores        2/9/2025    12:38 PM 2/12/2025    10:23 AM 2/21/2025    10:39 AM   PROMIS-10 Total Score w/o Sub Scores   PROMIS TOTAL - SUBSCORES 22  24 28        Patient-reported         Referral / Collaboration:  Referral to another professional/service is not indicated at this time..    Anticipated number of session for this episode of care:  20-30 sessions  Anticipation frequency of session: Weekly  Anticipated Duration of each session: 38-52 minutes  Treatment plan will be reviewed in 90 days or when goals have been changed.       MeasurableTreatment Goal(s) related to diagnosis / functional impairment(s)  Goal 1: Patient will like to manage his anxiety    I will know I've met my goal when my anxiety goes down.      Objective #A (Patient Action)    Patient will  set boundaries at work .  Status: New - Date: 2/26/2025      Intervention(s)  Therapist will teach about healthy boundaries.   .    Objective #B  Patient will  name and express his feelings .  Status: New - Date: 2/26/2025      Intervention(s)  Therapist will assign homework Patient will use emotions wheel .    Patient has reviewed and agreed to the above plan.      Manjinder BRUNO Caro  February 26, 2025

## 2025-05-13 ENCOUNTER — TRANSFERRED RECORDS (OUTPATIENT)
Dept: ADMINISTRATIVE | Facility: CLINIC | Age: 40
End: 2025-05-13
Payer: COMMERCIAL

## 2025-05-15 ENCOUNTER — VIRTUAL VISIT (OUTPATIENT)
Dept: PSYCHOLOGY | Facility: CLINIC | Age: 40
End: 2025-05-15
Payer: COMMERCIAL

## 2025-05-15 DIAGNOSIS — F41.1 GENERALIZED ANXIETY DISORDER: Primary | ICD-10-CM

## 2025-05-15 DIAGNOSIS — F33.1 MODERATE EPISODE OF RECURRENT MAJOR DEPRESSIVE DISORDER (H): ICD-10-CM

## 2025-05-15 NOTE — PROGRESS NOTES
2025        [place] [address]      Viet Beebe,  :  1985    To whom it may concern,     Viet was seen by me in clinic on May 1st, 2025 for management of his Irritable Bowel Syndrome with predominant constipation episodes. He experiences fecal urgency episodes associated with this diagnosis requiring him to have urgent evacuation. I recommend he continue to work from home to have access to a bathroom readily for the next 3 months until we can further properly manage his symptoms and reduce episodes of urgency and further manage the predictability of his bowels with a proper bowel regimen.       Thank you.      Electronically signed by:  Kay Kennedy NP 2025 10:01 AM  Document generated by:  Kay Kennedy NP  2025  If your provider ordered multiple tests; the results may not become available at the same time.  If multiple test results are received within 14 days of one another, you may receive a duplicate.

## 2025-05-15 NOTE — PROGRESS NOTES
[facility]                                     Progress Note    Patient Name: Viet Beebe  Date: 5/15/25           Service Type: Individual      Session Start Time: 9:20 am  Session End Time: 9:52 am     Session Length: 38 minutes    Session #: 13    Attendees: Client attended alone    Service Modality:  Video Visit:      Provider verified identity through the following two step process.  Patient provided:  Patient is known previously to provider    Telemedicine Visit: The patient's condition can be safely assessed and treated via synchronous audio and visual telemedicine encounter.      Reason for Telemedicine Visit: Services only offered telehealth    Originating Site (Patient Location): Patient's home    Distant Site (Provider Location): [facility]    Consent:  The patient/guardian has verbally consented to: the potential risks and benefits of telemedicine (video visit) versus in person care; bill my insurance or make self-payment for services provided; and responsibility for payment of non-covered services.     Patient would like the video invitation sent by:  Send to e-mail at: [email or web address]    Mode of Communication:  Video Conference via [facility]    Distant Location (Provider):  On-site    As the provider I attest to compliance with applicable laws and regulations related to telemedicine.    DATA  Interactive Complexity: No  Crisis: No        Progress Since Last Session (Related to Symptoms / Goals / Homework):   Symptoms: Improving Patient has been feeling better, mood is stable.      Homework: Did not complete      Episode of Care Goals: Satisfactory progress - ACTION (Actively working towards change); Intervened by reinforcing change plan / affirming steps taken     Current / Ongoing Stressors and Concerns:   Pt reports ongoing stressors as navigating various relationships and roles in different areas of life. Pt shared events that took place since last  session and processed emotions. Patient reported that he would like to make some changes to the number of hours he spent in front of the TV.  He reflected on the reasons why he enjoys watching TV.  He also reported he would like to do more physical activities and have more time to play with his cats.  He decided to try to do what he wants to do before he sits to watch TV.  By delaying the gratification of watching TV, he will be able to get some physical activity and reduce the amount of time he spents in front of the TV.    Treatment Objective(s) Addressed in This Session:   Help patient come up for solutions to get more physical activity while still watching TV.       Intervention:   Motivational Interviewing: Asking questions about reasons for communicating feelings and becoming more assertive.    Assessments completed prior to visit:  The following assessments were completed by patient for this visit:  PHQ9:       6/6/2024    11:42 AM 2/11/2025    11:51 AM 2/12/2025    10:28 AM 2/25/2025    10:58 AM 4/3/2025     9:37 AM 4/8/2025    11:29 AM 5/14/2025     9:06 AM   PHQ-9 SCORE   PHQ-9 Total Score MyChart 9 (Mild depression) 10 (Moderate depression)  11 (Moderate depression) 9 (Mild depression) 8 (Mild depression) 6 (Mild depression)   PHQ-9 Total Score 9 10  13 11  9  8  6        Patient-reported     GAD7:       7/6/2021    12:11 PM 3/28/2022     9:22 AM 5/17/2022     3:34 PM 4/11/2023     7:09 AM 6/6/2024    11:43 AM 2/12/2025    10:28 AM 2/21/2025    10:35 AM   MONIK-7 SCORE   Total Score  6 (mild anxiety) 7 (mild anxiety) 6 (mild anxiety) 7 (mild anxiety)  18 (severe anxiety)   Total Score 6 6 7 6 7 18 18        Patient-reported     PROMIS 10-Global Health (all questions and answers displayed):       2/9/2025    12:38 PM 2/12/2025    10:23 AM 2/21/2025    10:39 AM   PROMIS 10   In general, would you say your health is: Fair  Good   In general, would you say your quality of life is: Good  Very good   In general,  how would you rate your physical health? Good  Very good   In general, how would you rate your mental health, including your mood and your ability to think? Poor  Good   In general, how would you rate your satisfaction with your social activities and relationships? Fair  Very good   In general, please rate how well you carry out your usual social activities and roles Very good  Excellent   To what extent are you able to carry out your everyday physical activities such as walking, climbing stairs, carrying groceries, or moving a chair? Completely  Completely   In the past 7 days, how often have you been bothered by emotional problems such as feeling anxious, depressed, or irritable? Often  Often   In the past 7 days, how would you rate your fatigue on average? Moderate  Moderate   In the past 7 days, how would you rate your pain on average, where 0 means no pain, and 10 means worst imaginable pain? 6  5   In general, would you say your health is: 2 3 3   In general, would you say your quality of life is: 3 3 4   In general, how would you rate your physical health? 3 3 4   In general, how would you rate your mental health, including your mood and your ability to think? 1 3 3   In general, how would you rate your satisfaction with your social activities and relationships? 2 4 4   In general, please rate how well you carry out your usual social activities and roles. (This includes activities at home, at work and in your community, and responsibilities as a parent, child, spouse, employee, friend, etc.) 4 3 5   To what extent are you able to carry out your everyday physical activities such as walking, climbing stairs, carrying groceries, or moving a chair? 5 4 5   In the past 7 days, how often have you been bothered by emotional problems such as feeling anxious, depressed, or irritable? 4 4 4   In the past 7 days, how would you rate your fatigue on average? 3 4 3   In the past 7 days, how would you rate your pain on  "average, where 0 means no pain, and 10 means worst imaginable pain? 6 4 5   Global Mental Health Score 8  12 13    Global Physical Health Score 14  12 15    PROMIS TOTAL - SUBSCORES 22  24 28        Patient-reported     [place] Suicide Severity Rating Scale (Lifetime/Recent)      4/4/2024    12:10 PM 2/12/2025    10:17 AM 2/16/2025    12:33 AM 2/16/2025     9:32 AM 2/16/2025     6:09 PM 2/16/2025    10:00 PM   [place] Suicide Severity Rating (Lifetime/Recent)   Q1 Wished to be Dead (Past Month) 0-->no  0-->no 0-->no 0-->no 0-->no   Q2 Suicidal Thoughts (Past Month) 0-->no  0-->no 0-->no 0-->no 0-->no   Q6 Suicide Behavior (Lifetime) 0-->no  0-->no 0-->no 0-->no 0-->no   Level of Risk per Screen no risks indicated  no risks indicated no risks indicated no risks indicated no risks indicated   1. Wish to be Dead (Lifetime)  Y       Wish to be Dead Description (Lifetime)  \"I do not feel worthy\" \" I do not feel that I deserve to be here.\"       1. Wish to be Dead (Past 1 Month)  N       2. Non-Specific Active Suicidal Thoughts (Lifetime)  N       Most Severe Ideation Rating (Lifetime)  3       Description of Most Severe Ideation (Lifetime)  \"I am going to be in pain forever.\"       Most Severe Ideation Rating (Past 1 Month)  1       Frequency (Lifetime)  2       Frequency (Past 1 Month)  1       Duration (Lifetime)  1       Duration (Past 1 Month)  1       Controllability (Lifetime)  1       Controllability (Past 1 Month)  1       Deterrents (Lifetime)  1       Deterrents (Past 1 Month)  1       Reasons for Ideation (Lifetime)  5       Reasons for Ideation (Past 1 Month)  1       Actual Attempt (Lifetime)  N       Has subject engaged in non-suicidal self-injurious behavior? (Lifetime)  N       Interrupted Attempts (Lifetime)  N       Aborted or Self-Interrupted Attempt (Lifetime)  N       Preparatory Acts or Behavior (Lifetime)  N       Calculated C-SSRS Risk Score (Lifetime/Recent)  No Risk Indicated     "         ASSESSMENT: Current Emotional / Mental Status (status of significant symptoms):   Risk status (Self / Other harm or suicidal ideation)   Patient denies current fears or concerns for personal safety.   Patient denies current or recent suicidal ideation or behaviors.   Patient denies current or recent homicidal ideation or behaviors.   Patient denies current or recent self injurious behavior or ideation.   Patient denies other safety concerns.   Patient reports there has been no change in risk factors since their last session.     Patient reports there has been no change in protective factors since their last session.     Recommended that patient call 911 or go to the local ED should there be a change in any of these risk factors     Appearance:   Appropriate    Eye Contact:   Good    Psychomotor Behavior: Normal    Attitude:   Cooperative  Friendly Attentive   Orientation:   All   Speech    Rate / Production: Normal     Volume:  Normal    Mood:    Anxious  Fearful   Affect:    Appropriate    Thought Content:  Clear    Thought Form:  Coherent  Logical    Insight:    Good      Medication Review:   No changes to current psychiatric medication(s)     Medication Compliance:   Yes     Changes in Health Issues:   None reported     Chemical Use Review:   Substance Use: Chemical use reviewed, no active concerns identified      Tobacco Use: No current tobacco use.      Diagnosis:  1. Generalized anxiety disorder    2. Moderate episode of recurrent major depressive disorder (H)                            Collateral Reports Completed:   Not Applicable    PLAN: (Patient Tasks / Therapist Tasks / Other)    Patient is making good progress and has continued working on these goals.  Patient will continue to reflect on how to develop self.  Patient will make the effort to communicate assertively       Manjinder BRUNO Caro                                                          ______________________________________________________________________    Individual Treatment Plan    Patient's Name: Viet Beebe  YOB: 1985    Date of Creation: 2/26/2025  Date Treatment Plan Last Reviewed/Revised: 2/26/2025    DSM5 Diagnoses: 300.02 (F41.1) Generalized Anxiety Disorder  Psychosocial / Contextual Factors: Patient has stopped using alcohol and marijuana and has been experiencing an increase in anxiety.  PROMIS (reviewed every 90 days): PROMIS-10 Scores        2/9/2025    12:38 PM 2/12/2025    10:23 AM 2/21/2025    10:39 AM   PROMIS-10 Total Score w/o Sub Scores   PROMIS TOTAL - SUBSCORES 22  24 28        Patient-reported         Referral / Collaboration:  Referral to another professional/service is not indicated at this time..    Anticipated number of session for this episode of care: 20-30 sessions  Anticipation frequency of session: Weekly  Anticipated Duration of each session: 38-52 minutes  Treatment plan will be reviewed in 90 days or when goals have been changed.       MeasurableTreatment Goal(s) related to diagnosis / functional impairment(s)  Goal 1: Patient will like to manage his anxiety    I will know I've met my goal when my anxiety goes down.      Objective #A (Patient Action)    Patient will set boundaries at work.  Status: New - Date: 2/26/2025     Intervention(s)  Therapist will teach about healthy boundaries.  .    Objective #B  Patient will name and express his feelings.  Status: New - Date: 2/26/2025     Intervention(s)  Therapist will assign homework Patient will use emotions wheel.    Patient has reviewed and agreed to the above plan.      Manjinder BRUNO Caro  February 26, 2025

## 2025-05-28 ENCOUNTER — VIRTUAL VISIT (OUTPATIENT)
Dept: PSYCHOLOGY | Facility: CLINIC | Age: 40
End: 2025-05-28
Payer: COMMERCIAL

## 2025-05-28 DIAGNOSIS — F41.1 GENERALIZED ANXIETY DISORDER: ICD-10-CM

## 2025-05-28 DIAGNOSIS — F33.1 MODERATE EPISODE OF RECURRENT MAJOR DEPRESSIVE DISORDER (H): Primary | ICD-10-CM

## 2025-05-28 PROCEDURE — 90834 PSYTX W PT 45 MINUTES: CPT | Mod: 95

## 2025-05-28 NOTE — PROGRESS NOTES
[facility]                                     Progress Note    Patient Name: Viet Beebe  Date: 5/28/25           Service Type: Individual      Session Start Time: 10:09: am  Session End Time: 10:47 am     Session Length: 38 minutes    Session #: 15    Attendees: Client attended alone    Service Modality:  Video Visit:      Provider verified identity through the following two step process.  Patient provided:  Patient is known previously to provider    Telemedicine Visit: The patient's condition can be safely assessed and treated via synchronous audio and visual telemedicine encounter.      Reason for Telemedicine Visit: Services only offered telehealth    Originating Site (Patient Location): Patient's home    Distant Site (Provider Location): Provider Remote Setting- Home Office    Consent:  The patient/guardian has verbally consented to: the potential risks and benefits of telemedicine (video visit) versus in person care; bill my insurance or make self-payment for services provided; and responsibility for payment of non-covered services.     Patient would like the video invitation sent by:  Send to e-mail at: [email or web address]    Mode of [facility]:  [facility]    Distant Location (Provider):  Off-site    As the provider I attest to compliance with applicable laws and regulations related to telemedicine.    DATA  Interactive Complexity: No  Crisis: No        Progress Since Last Session (Related to Symptoms / Goals / Homework):   Symptoms: Improving Patient has been feeling better, mood is stable.      Homework: Partially completed      Episode of Care Goals: Satisfactory progress - ACTION (Actively working towards change); Intervened by reinforcing change plan / affirming steps taken     Current / Ongoing Stressors and Concerns:   Pt reports ongoing stressors as navigating various relationships and roles in different areas of life. Pt shared events that took place since  last session and processed emotions. He reflected on how his life is changing as he becomes more aware of his thoughts and feelings.  He gave some examples of situations in which he was able to manage his feelings and after that, reframe his thinking.  He continues to have healthy habits to manage his sleep.    Treatment Objective(s) Addressed in This Session:   Increase interest, engagement, and pleasure in doing things  Decrease frequency and intensity of feeling down, depressed, hopeless  Identify negative self-talk and behaviors: challenge core beliefs, myths, and actions  Improve concentration, focus, and mindfulness in daily activities .       Intervention:   CBT: Therapist addressed distorted feelings and modeled modeling anxious and distorted thoughts.  Motivational Interviewing: Asking questions about reasons for communicating feelings and becoming more assertive.    Assessments completed prior to visit:  The following assessments were completed by patient for this visit:  PHQ9:       6/6/2024    11:42 AM 2/11/2025    11:51 AM 2/12/2025    10:28 AM 2/25/2025    10:58 AM 4/3/2025     9:37 AM 4/8/2025    11:29 AM 5/14/2025     9:06 AM   PHQ-9 SCORE   PHQ-9 Total Score MyChart 9 (Mild depression) 10 (Moderate depression)  11 (Moderate depression) 9 (Mild depression) 8 (Mild depression) 6 (Mild depression)   PHQ-9 Total Score 9 10  13 11  9  8  6        Patient-reported     GAD7:       7/6/2021    12:11 PM 3/28/2022     9:22 AM 5/17/2022     3:34 PM 4/11/2023     7:09 AM 6/6/2024    11:43 AM 2/12/2025    10:28 AM 2/21/2025    10:35 AM   MONIK-7 SCORE   Total Score  6 (mild anxiety) 7 (mild anxiety) 6 (mild anxiety) 7 (mild anxiety)  18 (severe anxiety)   Total Score 6 6 7 6 7 18 18        Patient-reported     PROMIS 10-Global Health (all questions and answers displayed):       2/9/2025    12:38 PM 2/12/2025    10:23 AM 2/21/2025    10:39 AM 5/23/2025    11:04 AM   PROMIS 10   In general, would you say your health  is: Fair  Good Very good   In general, would you say your quality of life is: Good  Very good Very good   In general, how would you rate your physical health? Good  Very good Very good   In general, how would you rate your mental health, including your mood and your ability to think? Poor  Good Good   In general, how would you rate your satisfaction with your social activities and relationships? Fair  Very good Good   In general, please rate how well you carry out your usual social activities and roles Very good  Excellent Good   To what extent are you able to carry out your everyday physical activities such as walking, climbing stairs, carrying groceries, or moving a chair? Completely  Completely Completely   In the past 7 days, how often have you been bothered by emotional problems such as feeling anxious, depressed, or irritable? Often  Often Sometimes   In the past 7 days, how would you rate your fatigue on average? Moderate  Moderate Moderate   In the past 7 days, how would you rate your pain on average, where 0 means no pain, and 10 means worst imaginable pain? 6  5 5   In general, would you say your health is: 2 3 3 4   In general, would you say your quality of life is: 3 3 4 4   In general, how would you rate your physical health? 3 3 4 4   In general, how would you rate your mental health, including your mood and your ability to think? 1 3 3 3   In general, how would you rate your satisfaction with your social activities and relationships? 2 4 4 3   In general, please rate how well you carry out your usual social activities and roles. (This includes activities at home, at work and in your community, and responsibilities as a parent, child, spouse, employee, friend, etc.) 4 3 5 3   To what extent are you able to carry out your everyday physical activities such as walking, climbing stairs, carrying groceries, or moving a chair? 5 4 5 5   In the past 7 days, how often have you been bothered by emotional  "problems such as feeling anxious, depressed, or irritable? 4 4 4 3   In the past 7 days, how would you rate your fatigue on average? 3 4 3 3   In the past 7 days, how would you rate your pain on average, where 0 means no pain, and 10 means worst imaginable pain? 6 4 5 5   Global Mental Health Score 8  12 13  13    Global Physical Health Score 14  12 15  15    PROMIS TOTAL - SUBSCORES 22  24 28  28        Patient-reported     [place] Suicide Severity Rating Scale (Lifetime/Recent)      4/4/2024    12:10 PM 2/12/2025    10:17 AM 2/16/2025    12:33 AM 2/16/2025     9:32 AM 2/16/2025     6:09 PM 2/16/2025    10:00 PM   [place] Suicide Severity Rating (Lifetime/Recent)   Q1 Wished to be Dead (Past Month) 0-->no  0-->no 0-->no 0-->no 0-->no   Q2 Suicidal Thoughts (Past Month) 0-->no  0-->no 0-->no 0-->no 0-->no   Q6 Suicide Behavior (Lifetime) 0-->no  0-->no 0-->no 0-->no 0-->no   Level of Risk per Screen no risks indicated  no risks indicated no risks indicated no risks indicated no risks indicated   1. Wish to be Dead (Lifetime)  Y       Wish to be Dead Description (Lifetime)  \"I do not feel worthy\" \" I do not feel that I deserve to be here.\"       1. Wish to be Dead (Past 1 Month)  N       2. Non-Specific Active Suicidal Thoughts (Lifetime)  N       Most Severe Ideation Rating (Lifetime)  3       Description of Most Severe Ideation (Lifetime)  \"I am going to be in pain forever.\"       Most Severe Ideation Rating (Past 1 Month)  1       Frequency (Lifetime)  2       Frequency (Past 1 Month)  1       Duration (Lifetime)  1       Duration (Past 1 Month)  1       Controllability (Lifetime)  1       Controllability (Past 1 Month)  1       Deterrents (Lifetime)  1       Deterrents (Past 1 Month)  1       Reasons for Ideation (Lifetime)  5       Reasons for Ideation (Past 1 Month)  1       Actual Attempt (Lifetime)  N       Has subject engaged in non-suicidal self-injurious behavior? (Lifetime)  N       Interrupted Attempts " (Lifetime)  N       Aborted or Self-Interrupted Attempt (Lifetime)  N       Preparatory Acts or Behavior (Lifetime)  N       Calculated C-SSRS Risk Score (Lifetime/Recent)  No Risk Indicated             ASSESSMENT: Current Emotional / Mental Status (status of significant symptoms):   Risk status (Self / Other harm or suicidal ideation)   Patient denies current fears or concerns for personal safety.   Patient denies current or recent suicidal ideation or behaviors.   Patient denies current or recent homicidal ideation or behaviors.   Patient denies current or recent self injurious behavior or ideation.   Patient denies other safety concerns.   Patient reports there has been no change in risk factors since their last session.     Patient reports there has been no change in protective factors since their last session.     Recommended that patient call 911 or go to the local ED should there be a change in any of these risk factors     Appearance:   Appropriate    Eye Contact:   Good    Psychomotor Behavior: Normal    Attitude:   Cooperative  Friendly Attentive   Orientation:   All   Speech    Rate / Production: Normal     Volume:  Normal    Mood:    Anxious  Fearful   Affect:    Worrisome    Thought Content:  Clear    Thought Form:  Coherent  Logical    Insight:    Good      Medication Review:   No changes to current psychiatric medication(s)     Medication Compliance:   Yes     Changes in Health Issues:   None reported     Chemical Use Review:   Substance Use: Chemical use reviewed, no active concerns identified      Tobacco Use: No current tobacco use.      Diagnosis:  1. Moderate episode of recurrent major depressive disorder (H)    2. Generalized anxiety disorder            Collateral Reports Completed:   Not Applicable    PLAN: (Patient Tasks / Therapist Tasks / Other)    Patient will practice noticing negative thoughts and countering them  Patient will put phone away in the dresser to improve sleep  Patient will  notice how much time he spends watching TV.      Manjinder BRUNO Caro                                                         ______________________________________________________________________    Individual Treatment Plan    Patient's Name: Viet Beebe  YOB: 1985    Date of Creation: 2/26/2025  Date Treatment Plan Last Reviewed/Revised: 2/26/2025    DSM5 Diagnoses: 300.02 (F41.1) Generalized Anxiety Disorder  Psychosocial / Contextual Factors: Patient has stopped using alcohol and marijuana and has been experiencing an increase in anxiety.  PROMIS (reviewed every 90 days): PROMIS-10 Scores        2/12/2025    10:23 AM 2/21/2025    10:39 AM 5/23/2025    11:04 AM   PROMIS-10 Total Score w/o Sub Scores   PROMIS TOTAL - SUBSCORES 24 28  28        Patient-reported         Referral / Collaboration:  Referral to another professional/service is not indicated at this time..    Anticipated number of session for this episode of care: 20-30 sessions  Anticipation frequency of session: Weekly  Anticipated Duration of each session: 38-52 minutes  Treatment plan will be reviewed in 90 days or when goals have been changed.       MeasurableTreatment Goal(s) related to diagnosis / functional impairment(s)  Goal 1: Patient will like to manage his anxiety    I will know I've met my goal when my anxiety goes down.      Objective #A (Patient Action)    Patient will set boundaries at work.  Status: New - Date: 2/26/2025     Intervention(s)  Therapist will teach about healthy boundaries.  .    Objective #B  Patient will name and express his feelings.  Status: New - Date: 2/26/2025     Intervention(s)  Therapist will assign homework Patient will use emotions wheel.    Patient has reviewed and agreed to the above plan.      Manjinder BRUNO Caro  February 26, 2025

## 2025-06-06 SDOH — HEALTH STABILITY: PHYSICAL HEALTH: ON AVERAGE, HOW MANY DAYS PER WEEK DO YOU ENGAGE IN MODERATE TO STRENUOUS EXERCISE (LIKE A BRISK WALK)?: 3 DAYS

## 2025-06-06 SDOH — HEALTH STABILITY: PHYSICAL HEALTH: ON AVERAGE, HOW MANY MINUTES DO YOU ENGAGE IN EXERCISE AT THIS LEVEL?: 50 MIN

## 2025-06-06 ASSESSMENT — SOCIAL DETERMINANTS OF HEALTH (SDOH): HOW OFTEN DO YOU GET TOGETHER WITH FRIENDS OR RELATIVES?: ONCE A WEEK

## 2025-06-10 ENCOUNTER — OFFICE VISIT (OUTPATIENT)
Dept: FAMILY MEDICINE | Facility: CLINIC | Age: 40
End: 2025-06-10
Attending: FAMILY MEDICINE
Payer: COMMERCIAL

## 2025-06-10 VITALS
DIASTOLIC BLOOD PRESSURE: 83 MMHG | TEMPERATURE: 97.4 F | HEIGHT: 69 IN | SYSTOLIC BLOOD PRESSURE: 122 MMHG | HEART RATE: 85 BPM | BODY MASS INDEX: 27.58 KG/M2 | WEIGHT: 186.2 LBS | OXYGEN SATURATION: 98 % | RESPIRATION RATE: 16 BRPM

## 2025-06-10 DIAGNOSIS — Z13.6 SCREENING FOR CARDIOVASCULAR CONDITION: ICD-10-CM

## 2025-06-10 DIAGNOSIS — J34.89 NASAL OBSTRUCTION: ICD-10-CM

## 2025-06-10 DIAGNOSIS — F41.9 ANXIETY AND DEPRESSION: ICD-10-CM

## 2025-06-10 DIAGNOSIS — K21.9 GASTROESOPHAGEAL REFLUX DISEASE WITHOUT ESOPHAGITIS: ICD-10-CM

## 2025-06-10 DIAGNOSIS — G43.109 MIGRAINE WITH AURA AND WITHOUT STATUS MIGRAINOSUS, NOT INTRACTABLE: ICD-10-CM

## 2025-06-10 DIAGNOSIS — Z00.00 ROUTINE GENERAL MEDICAL EXAMINATION AT A HEALTH CARE FACILITY: Primary | ICD-10-CM

## 2025-06-10 DIAGNOSIS — Z13.6 CARDIOVASCULAR SCREENING; LDL GOAL LESS THAN 160: ICD-10-CM

## 2025-06-10 DIAGNOSIS — J31.0 RHINITIS MEDICAMENTOSA: ICD-10-CM

## 2025-06-10 DIAGNOSIS — F32.A ANXIETY AND DEPRESSION: ICD-10-CM

## 2025-06-10 DIAGNOSIS — T48.5X5A RHINITIS MEDICAMENTOSA: ICD-10-CM

## 2025-06-10 DIAGNOSIS — I10 HYPERTENSION GOAL BP (BLOOD PRESSURE) < 130/80: ICD-10-CM

## 2025-06-10 DIAGNOSIS — E87.1 HYPONATREMIA: ICD-10-CM

## 2025-06-10 LAB
ALBUMIN SERPL BCG-MCNC: 4.8 G/DL (ref 3.5–5.2)
ALP SERPL-CCNC: 90 U/L (ref 40–150)
ALT SERPL W P-5'-P-CCNC: 26 U/L (ref 0–70)
ANION GAP SERPL CALCULATED.3IONS-SCNC: 11 MMOL/L (ref 7–15)
AST SERPL W P-5'-P-CCNC: 30 U/L (ref 0–45)
BILIRUB SERPL-MCNC: 0.2 MG/DL
BUN SERPL-MCNC: 20.7 MG/DL (ref 6–20)
CALCIUM SERPL-MCNC: 9.9 MG/DL (ref 8.8–10.4)
CHLORIDE SERPL-SCNC: 99 MMOL/L (ref 98–107)
CHOLEST SERPL-MCNC: 243 MG/DL
CREAT SERPL-MCNC: 0.9 MG/DL (ref 0.67–1.17)
CREAT UR-MCNC: 105 MG/DL
EGFRCR SERPLBLD CKD-EPI 2021: >90 ML/MIN/1.73M2
FASTING STATUS PATIENT QL REPORTED: NO
FASTING STATUS PATIENT QL REPORTED: NO
GLUCOSE SERPL-MCNC: 93 MG/DL (ref 70–99)
HCO3 SERPL-SCNC: 28 MMOL/L (ref 22–29)
HDLC SERPL-MCNC: 48 MG/DL
LDLC SERPL CALC-MCNC: 152 MG/DL
MICROALBUMIN UR-MCNC: <12 MG/L
MICROALBUMIN/CREAT UR: NORMAL MG/G{CREAT}
NONHDLC SERPL-MCNC: 195 MG/DL
POTASSIUM SERPL-SCNC: 4.2 MMOL/L (ref 3.4–5.3)
PROT SERPL-MCNC: 7.9 G/DL (ref 6.4–8.3)
SODIUM SERPL-SCNC: 138 MMOL/L (ref 135–145)
TRIGL SERPL-MCNC: 213 MG/DL

## 2025-06-10 PROCEDURE — 80053 COMPREHEN METABOLIC PANEL: CPT | Performed by: FAMILY MEDICINE

## 2025-06-10 PROCEDURE — 1126F AMNT PAIN NOTED NONE PRSNT: CPT | Performed by: FAMILY MEDICINE

## 2025-06-10 PROCEDURE — 80061 LIPID PANEL: CPT | Performed by: FAMILY MEDICINE

## 2025-06-10 PROCEDURE — 99214 OFFICE O/P EST MOD 30 MIN: CPT | Mod: 25 | Performed by: FAMILY MEDICINE

## 2025-06-10 PROCEDURE — 3074F SYST BP LT 130 MM HG: CPT | Performed by: FAMILY MEDICINE

## 2025-06-10 PROCEDURE — 82043 UR ALBUMIN QUANTITATIVE: CPT | Performed by: FAMILY MEDICINE

## 2025-06-10 PROCEDURE — 3079F DIAST BP 80-89 MM HG: CPT | Performed by: FAMILY MEDICINE

## 2025-06-10 PROCEDURE — 99396 PREV VISIT EST AGE 40-64: CPT | Performed by: FAMILY MEDICINE

## 2025-06-10 PROCEDURE — 82570 ASSAY OF URINE CREATININE: CPT | Performed by: FAMILY MEDICINE

## 2025-06-10 PROCEDURE — 36415 COLL VENOUS BLD VENIPUNCTURE: CPT | Performed by: FAMILY MEDICINE

## 2025-06-10 RX ORDER — SERTRALINE HYDROCHLORIDE 100 MG/1
150 TABLET, FILM COATED ORAL DAILY
Qty: 135 TABLET | Refills: 3 | Status: SHIPPED | OUTPATIENT
Start: 2025-06-10

## 2025-06-10 RX ORDER — LISINOPRIL AND HYDROCHLOROTHIAZIDE 20; 25 MG/1; MG/1
1 TABLET ORAL DAILY
Qty: 90 TABLET | Refills: 3 | Status: SHIPPED | OUTPATIENT
Start: 2025-06-10

## 2025-06-10 RX ORDER — SUMATRIPTAN 50 MG/1
TABLET, FILM COATED ORAL
Qty: 18 TABLET | Refills: 11 | Status: SHIPPED | OUTPATIENT
Start: 2025-06-10

## 2025-06-10 RX ORDER — OMEPRAZOLE 20 MG/1
CAPSULE, DELAYED RELEASE ORAL
Qty: 90 CAPSULE | Refills: 3 | Status: SHIPPED | OUTPATIENT
Start: 2025-06-10

## 2025-06-10 ASSESSMENT — PATIENT HEALTH QUESTIONNAIRE - PHQ9
10. IF YOU CHECKED OFF ANY PROBLEMS, HOW DIFFICULT HAVE THESE PROBLEMS MADE IT FOR YOU TO DO YOUR WORK, TAKE CARE OF THINGS AT HOME, OR GET ALONG WITH OTHER PEOPLE: SOMEWHAT DIFFICULT
SUM OF ALL RESPONSES TO PHQ QUESTIONS 1-9: 7
SUM OF ALL RESPONSES TO PHQ QUESTIONS 1-9: 7

## 2025-06-10 ASSESSMENT — PAIN SCALES - GENERAL: PAINLEVEL_OUTOF10: NO PAIN (0)

## 2025-06-10 NOTE — PROGRESS NOTES
"Preventive Care Visit  [facility]  Joel Daniel Wegener, MD, Family Medicine  Isra 10, 2025      Assessment & Plan     Routine general medical examination at a health care facility    Rhinitis medicamentosa:  recommend stopping oxymetazolone.  Follow up with ent if continued nasal obstruction in 3-4 weeks.     Not immune suppressed so not due for pneumonia/shingles.     Mood doing excellently with therapy and medication, very happy with this!     Blood pressue at goal essentially, no changes.     Hypertension goal BP (blood pressure) < 130/80    - COMPREHENSIVE METABOLIC PANEL; Future  - Albumin Random Urine Quantitative with Creat Ratio; Future  - lisinopril-hydrochlorothiazide (ZESTORETIC) 20-25 MG tablet; Take 1 tablet by mouth daily.  - COMPREHENSIVE METABOLIC PANEL  - Albumin Random Urine Quantitative with Creat Ratio    Screening for cardiovascular condition      Anxiety and depressio  - sertraline (ZOLOFT) 100 MG tablet; Take 1.5 tablets (150 mg) by mouth daily.    Gastroesophageal reflux disease without esophagitis    - omeprazole (PRILOSEC) 20 MG DR capsule; TAKE 1 CAPSULE BY MOUTH 30 TO 60 MINUTES BEFORE A MEAL    Migraine with aura and without status migrainosus, not intractable    - SUMAtriptan (IMITREX) 50 MG tablet; TAKE 1 TABLET AT ONSET OF HEADACHE FOR MIGRAINE MAY REPEAT IN 2 HOURS. MAXIMUM 4 TABLETS IN 24 HOURS    CARDIOVASCULAR SCREENING; LDL GOAL LESS THAN 160    - Lipid panel reflex to direct LDL Non-fasting; Future  - Lipid panel reflex to direct LDL Non-fasting    Hyponatremia  Re-check today.     Rhinitis Medicamentosa:  \    Reports chronic nasal obstruction and using oxymetazolone most nights of the week so he can use cpap.  Does not feel he has allergies.  Oxymetazolone no longer seeming to help much.  Getting white \"pimples\" in nose which he wonders is part of the problem. Exam and history consistent with rhinitis medicamentosa from over-use of oxymetazolone.  " "Recommended stopping completely.  May get quite significantly increased nasal congestion for 1-2 weeks but then should be substantially better.  If continues to have nasal obstruction symptoms after that time I would recommend follow up with an ent provider as above.     Nasal obstruction    - Adult ENT  Referral; Future    In addition to preventive health exam and counseling 35 minutes spent on the date of the encounter doing chart review, history and exam, negotiating and explaining the plan with the patient, documentation and further activities as noted above.                BMI  Estimated body mass index is 27.5 kg/m  as calculated from the following:    Height as of this encounter: 1.753 m (5' 9\").    Weight as of this encounter: 84.5 kg (186 lb 3.2 oz).   Weight management plan: Discussed healthy diet and exercise guidelines    Counseling  Appropriate preventive services were addressed with this patient via screening, questionnaire, or discussion as appropriate for fall prevention, nutrition, physical activity, Tobacco-use cessation, social engagement, weight loss and cognition.  Checklist reviewing preventive services available has been given to the patient.  Reviewed patient's diet, addressing concerns and/or questions.   He is at risk for lack of exercise and has been provided with information to increase physical activity for the benefit of his well-being.   He is at risk for psychosocial distress and has been provided with information to reduce risk.   The patient's PHQ-9 score is consistent with mild depression. He was provided with information regarding depression.       Follow-up   No follow-ups on file.     Follow-up Visit   Expected date:  Isra 10, 2026 (Approximate)      Follow Up Appointment Details:     Follow-up with whom?: PCP    Follow-Up for what?: Adult Preventive    How?: In Person                 Rasheed Llanos is a 40 year old, presenting for the following:  Physical        " 6/10/2025     1:14 PM   Additional Questions   Roomed by Radha KELSEY   Accompanied by n/a          HPI           Advance Care Planning    Discussed advance care planning with patient; however, patient declined at this time.        6/6/2025   General Health   How would you rate your overall physical health? Good   Feel stress (tense, anxious, or unable to sleep) To some extent   (!) STRESS CONCERN      6/6/2025   Nutrition   Three or more servings of calcium each day? Yes   Diet: Gluten-free/reduced    Other   If other, please elaborate: Lactose intolerant, no processed foods, only eat whole foods.   How many servings of fruit and vegetables per day? 4 or more   How many sweetened beverages each day? 0-1       Multiple values from one day are sorted in reverse-chronological order         6/6/2025   Exercise   Days per week of moderate/strenous exercise 3 days   Average minutes spent exercising at this level 50 min         6/6/2025   Social Factors   Frequency of gathering with friends or relatives Once a week   Worry food won't last until get money to buy more No   Food not last or not have enough money for food? No   Do you have housing? (Housing is defined as stable permanent housing and does not include staying outside in a car, in a tent, in an abandoned building, in an overnight shelter, or couch-surfing.) Yes   Are you worried about losing your housing? No   Lack of transportation? No   Unable to get utilities (heat,electricity)? No         6/6/2025   Dental   Dentist two times every year? Yes       Today's PHQ-9 Score:       6/10/2025     1:13 PM   PHQ-9 SCORE   PHQ-9 Total Score MyChart 7 (Mild depression)   PHQ-9 Total Score 7        Patient-reported         6/6/2025   Substance Use   Alcohol more than 3/day or more than 7/wk No   Do you use any other substances recreationally? No     Social History     Tobacco Use    Smoking status: Former     Current packs/day: 0.00     Types: Cigarettes     Quit date:  "1/1/2010     Years since quitting: 15.4     Passive exposure: Never    Smokeless tobacco: Never   Vaping Use    Vaping status: Never Used   Substance Use Topics    Alcohol use: Yes     Comment: Drinks over rhe w/e. 5 drinks weekly    Drug use: Never     Comment: Marihuana every 3rd day.           6/6/2025   STI Screening   New sexual partner(s) since last STI/HIV test? No   ASCVD Risk   The 10-year ASCVD risk score (Sugar EDEN, et al., 2019) is: 1.8%    Values used to calculate the score:      Age: 40 years      Sex: Male      Is Non- : No      Diabetic: No      Tobacco smoker: No      Systolic Blood Pressure: 122 mmHg      Is BP treated: Yes      HDL Cholesterol: 41 mg/dL      Total Cholesterol: 211 mg/dL        6/6/2025   Contraception/Family Planning   Questions about contraception or family planning No        Reviewed and updated as needed this visit by Provider                    Past Medical History:   Diagnosis Date    Anxiety and depression     Sees Psychiatrist, [place], [place]. Depression for many years    Depressive disorder     Hypertension 2012    IBS (irritable bowel syndrome)      Past Surgical History:   Procedure Laterality Date    APPENDECTOMY  2007         Review of Systems  Constitutional, neuro, ENT, endocrine, pulmonary, cardiac, gastrointestinal, genitourinary, musculoskeletal, integument and psychiatric systems are negative, except as otherwise noted.     Objective    Exam  /83 (BP Location: Left arm, Patient Position: Sitting, Cuff Size: Adult Regular)   Pulse 85   Temp 97.4  F (36.3  C) (Temporal)   Resp 16   Ht 1.753 m (5' 9\")   Wt 84.5 kg (186 lb 3.2 oz)   SpO2 98%   BMI 27.50 kg/m     Estimated body mass index is 27.5 kg/m  as calculated from the following:    Height as of this encounter: 1.753 m (5' 9\").    Weight as of this encounter: 84.5 kg (186 lb 3.2 oz).    Physical Exam  GENERAL: alert and no distress  EYES: Eyes grossly normal to " inspection, PERRL and conjunctivae and sclerae normal  HENT: ear canals and TM's normal, mouth without ulcers or lesions, nose mucous membranes inflammed with white ulcer lesions bilaterally consistent with rhinitis medicamentosa  NECK: no adenopathy, no asymmetry, masses, or scars  RESP: lungs clear to auscultation - no rales, rhonchi or wheezes  CV: regular rate and rhythm, normal S1 S2, no S3 or S4, no murmur, click or rub, no peripheral edema  ABDOMEN: soft, nontender, no hepatosplenomegaly, no masses and bowel sounds normal  MS: no gross musculoskeletal defects noted, no edema  SKIN: no suspicious lesions or rashes  NEURO: Normal strength and tone, mentation intact and speech normal  PSYCH: mentation appears normal, affect normal/bright        Signed Electronically by: Joel Daniel Wegener, MD    Answers submitted by the patient for this visit:  Patient Health Questionnaire (Submitted on 6/10/2025)  If you checked off any problems, how difficult have these problems made it for you to do your work, take care of things at home, or get along with other people?: Somewhat difficult  PHQ9 TOTAL SCORE: 7

## 2025-06-10 NOTE — PATIENT INSTRUCTIONS
Rhinitis medicamentosa:  recommend stopping oxymetazolone.  Follow up with ent if continued nasal obstruction in 3-4 weeks.     Not immune suppressed so not due for pneumonia/shingles.     Mood doing excellently with therapy and medication, very happy with this!     Blood pressue at goal essentially, no changes.             Patient Education   Preventive Care Advice   This is general advice given by our system to help you stay healthy. However, your care team may have specific advice just for you. Please talk to your care team about your preventive care needs.  Nutrition  Eat 5 or more servings of fruits and vegetables each day.  Try wheat bread, brown rice and whole grain pasta (instead of white bread, rice, and pasta).  Get enough calcium and vitamin D. Check the label on foods and aim for 100% of the RDA (recommended daily allowance).  Lifestyle  Exercise at least 150 minutes each week  (30 minutes a day, 5 days a week).  Do muscle strengthening activities 2 days a week. These help control your weight and prevent disease.  No smoking.  Wear sunscreen to prevent skin cancer.  Have a dental exam and cleaning every 6 months.  Yearly exams  See your health care team every year to talk about:  Any changes in your health.  Any medicines your care team has prescribed.  Preventive care, family planning, and ways to prevent chronic diseases.  Shots (vaccines)   HPV shots (up to age 26), if you've never had them before.  Hepatitis B shots (up to age 59), if you've never had them before.  COVID-19 shot: Get this shot when it's due.  Flu shot: Get a flu shot every year.  Tetanus shot: Get a tetanus shot every 10 years.  Pneumococcal, hepatitis A, and RSV shots: Ask your care team if you need these based on your risk.  Shingles shot (for age 50 and up)  General health tests  Diabetes screening:  Starting at age 35, Get screened for diabetes at least every 3 years.  If you are younger than age 35, ask your care team if you  should be screened for diabetes.  Cholesterol test: At age 39, start having a cholesterol test every 5 years, or more often if advised.  Bone density scan (DEXA): At age 50, ask your care team if you should have this scan for osteoporosis (brittle bones).  Hepatitis C: Get tested at least once in your life.  STIs (sexually transmitted infections)  Before age 24: Ask your care team if you should be screened for STIs.  After age 24: Get screened for STIs if you're at risk. You are at risk for STIs (including HIV) if:  You are sexually active with more than one person.  You don't use condoms every time.  You or a partner was diagnosed with a sexually transmitted infection.  If you are at risk for HIV, ask about PrEP medicine to prevent HIV.  Get tested for HIV at least once in your life, whether you are at risk for HIV or not.  Cancer screening tests  Cervical cancer screening: If you have a cervix, begin getting regular cervical cancer screening tests starting at age 21.  Breast cancer scan (mammogram): If you've ever had breasts, begin having regular mammograms starting at age 40. This is a scan to check for breast cancer.  Colon cancer screening: It is important to start screening for colon cancer at age 45.  Have a colonoscopy test every 10 years (or more often if you're at risk) Or, ask your provider about stool tests like a FIT test every year or Cologuard test every 3 years.  To learn more about your testing options, visit:   .  For help making a decision, visit:   https://bit.ly/gv45016.  Prostate cancer screening test: If you have a prostate, ask your care team if a prostate cancer screening test (PSA) at age 55 is right for you.  Lung cancer screening: If you are a current or former smoker ages 50 to 80, ask your care team if ongoing lung cancer screenings are right for you.  For informational purposes only. Not to replace the advice of your health care provider. Copyright   2023 [employer]. All  rights reserved. Clinically reviewed by the [facility] Transitions Program. [employer] 220766 - REV 01/24.  Learning About Stress  What is stress?     Stress is your body's response to a hard situation. Your body can have a physical, emotional, or mental response. Stress is a fact of life for most people, and it affects everyone differently. What causes stress for you may not be stressful for someone else.  A lot of things can cause stress. You may feel stress when you go on a job interview, take a test, or run a race. This kind of short-term stress is normal and even useful. It can help you if you need to work hard or react quickly. For example, stress can help you finish an important job on time.  Long-term stress is caused by ongoing stressful situations or events. Examples of long-term stress include long-term health problems, ongoing problems at work, or conflicts in your family. Long-term stress can harm your health.  How does stress affect your health?  When you are stressed, your body responds as though you are in danger. It makes hormones that speed up your heart, make you breathe faster, and give you a burst of energy. This is called the fight-or-flight stress response. If the stress is over quickly, your body goes back to normal and no harm is done.  But if stress happens too often or lasts too long, it can have bad effects. Long-term stress can make you more likely to get sick, and it can make symptoms of some diseases worse. If you tense up when you are stressed, you may develop neck, shoulder, or low back pain. Stress is linked to high blood pressure and heart disease.  Stress also harms your emotional health. It can make you jorgensen, tense, or depressed. Your relationships may suffer, and you may not do well at work or school.  What can you do to manage stress?  You can try these things to help manage stress:   Do something active. Exercise or activity can help reduce stress. Walking is a great  way to get started. Even everyday activities such as housecleaning or yard work can help.  Try yoga or dustin chi. These techniques combine exercise and meditation. You may need some training at first to learn them.  Do something you enjoy. For example, listen to music or go to a movie. Practice your hobby or do volunteer work.  Meditate. This can help you relax, because you are not worrying about what happened before or what may happen in the future.  Do guided imagery. Imagine yourself in any setting that helps you feel calm. You can use online videos, books, or a teacher to guide you.  Do breathing exercises. For example:  From a standing position, bend forward from the waist with your knees slightly bent. Let your arms dangle close to the floor.  Breathe in slowly and deeply as you return to a standing position. Roll up slowly and lift your head last.  Hold your breath for just a few seconds in the standing position.  Breathe out slowly and bend forward from the waist.  Let your feelings out. Talk, laugh, cry, and express anger when you need to. Talking with supportive friends or family, a counselor, or a [employer] leader about your feelings is a healthy way to relieve stress. Avoid discussing your feelings with people who make you feel worse.  Write. It may help to write about things that are bothering you. This helps you find out how much stress you feel and what is causing it. When you know this, you can find better ways to cope.  What can you do to prevent stress?  You might try some of these things to help prevent stress:  Manage your time. This helps you find time to do the things you want and need to do.  Get enough sleep. Your body recovers from the stresses of the day while you are sleeping.  Get support. Your family, friends, and community can make a difference in how you experience stress.  Limit your news feed. Avoid or limit time on social media or news that may make you feel stressed.  Do something  "active. Exercise or activity can help reduce stress. Walking is a great way to get started.  Where can you learn more?  Go to https://www.[employer].net/patiented  Enter N032 in the search box to learn more about \"Learning About Stress.\"  Current as of: October 24, 2024  Content Version: 14.5 2024-2025 [employer].   Care instructions adapted under license by your healthcare professional. If you have questions about a medical condition or this instruction, always ask your healthcare professional. [employer] disclaims any warranty or liability for your use of this information.    Learning About Depression Screening  What is depression screening?  Depression screening is a way to see if you have depression symptoms. It may be done by a doctor or counselor. It's often part of a routine checkup. That's because your mental health is just as important as your physical health.  Depression is a mental health condition that affects how you feel, think, and act. You may:  Have less energy.  Lose interest in your daily activities.  Feel sad and grouchy for a long time.  Depression is very common. It affects people of all ages.  Many things can lead to depression. Some people become depressed after they have a stroke or find out they have a major illness like cancer or heart disease. The death of a loved one or a breakup may lead to depression. It can run in families. Most experts believe that a combination of inherited genes and stressful life events can cause it.  What happens during screening?  You may be asked to fill out a form about your depression symptoms. You and the doctor will discuss your answers. The doctor may ask you more questions to learn more about how you think, act, and feel.  What happens after screening?  If you have symptoms of depression, your doctor will talk to you about your options.  Doctors usually treat depression with medicines or counseling. Often, combining the two " "works best. Many people don't get help because they think that they'll get over the depression on their own. But people with depression may not get better unless they get treatment.  The cause of depression is not well understood. There may be many factors involved. But if you have depression, it's not your fault.  A serious symptom of depression is thinking about death or suicide. If you or someone you care about talks about this or about feeling hopeless, get help right away.  It's important to know that depression can be treated. Medicine, counseling, and self-care may help.  Where can you learn more?  Go to https://www.[employer].net/patiented  Enter T185 in the search box to learn more about \"Learning About Depression Screening.\"  Current as of: July 31, 2024  Content Version: 14.5    8427-6923 [employer].   Care instructions adapted under license by your healthcare professional. If you have questions about a medical condition or this instruction, always ask your healthcare professional. [employer] disclaims any warranty or liability for your use of this information.       "

## 2025-06-11 ENCOUNTER — VIRTUAL VISIT (OUTPATIENT)
Dept: PSYCHOLOGY | Facility: CLINIC | Age: 40
End: 2025-06-11
Payer: COMMERCIAL

## 2025-06-11 ENCOUNTER — PATIENT OUTREACH (OUTPATIENT)
Dept: CARE COORDINATION | Facility: CLINIC | Age: 40
End: 2025-06-11
Payer: COMMERCIAL

## 2025-06-11 DIAGNOSIS — F41.1 GENERALIZED ANXIETY DISORDER: ICD-10-CM

## 2025-06-11 DIAGNOSIS — F33.1 MODERATE EPISODE OF RECURRENT MAJOR DEPRESSIVE DISORDER (H): Primary | ICD-10-CM

## 2025-06-11 PROCEDURE — 90834 PSYTX W PT 45 MINUTES: CPT | Mod: 95

## 2025-06-11 NOTE — PROGRESS NOTES
[facility]                                     Progress Note    Patient Name: Viet Beebe  Date: 6/11/25           Service Type: Individual      Session Start Time: 10:04: am  Session End Time: 10:44 am     Session Length: 40 minutes    Session #: 16    Attendees: Client attended alone    Service Modality:  Video Visit:      Provider verified identity through the following two step process.  Patient provided:  Patient is known previously to provider    Telemedicine Visit: The patient's condition can be safely assessed and treated via synchronous audio and visual telemedicine encounter.      Reason for Telemedicine Visit: Services only offered telehealth    Originating Site (Patient Location): Patient's home    Distant Site (Provider Location): Provider Remote Setting- Home Office    Consent:  The patient/guardian has verbally consented to: the potential risks and benefits of telemedicine (video visit) versus in person care; bill my insurance or make self-payment for services provided; and responsibility for payment of non-covered services.     Patient would like the video invitation sent by:  Send to e-mail at: [email or web address]    Mode of Communication:  Video Conference via [facility]    Distant Location (Provider):  Off-site    As the provider I attest to compliance with applicable laws and regulations related to telemedicine.    DATA  Interactive Complexity: No  Crisis: No        Progress Since Last Session (Related to Symptoms / Goals / Homework):   Symptoms: Improving Patient has been feeling better, mood is improved.      Homework: Achieved / completed to satisfaction      Episode of Care Goals: Satisfactory progress - ACTION (Actively working towards change); Intervened by reinforcing change plan / affirming steps taken     Current / Ongoing Stressors and Concerns:   Pt reports ongoing stressors as navigating various relationships and roles in different areas of life. Pt shared events  that took place since last session and processed emotions. He reflected on a fantastic weekend he had with family and in nature.  He shared about how much he is enjoying his relationship with niece and reflecting on spending more time with her.  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  He is sleeping and eating well.    Treatment Objective(s) Addressed in This Session:   Increase interest, engagement, and pleasure in doing things  Decrease frequency and intensity of feeling down, depressed, hopeless  Identify negative self-talk and behaviors: challenge core beliefs, myths, and actions  Improve concentration, focus, and mindfulness in daily activities .       Intervention:   CBT: Therapist addressed distorted feelings and modeled modeling anxious and distorted thoughts.  Motivational Interviewing: Asking questions about reasons for communicating feelings and becoming more assertive.    Assessments completed prior to visit:  The following assessments were completed by patient for this visit:  PHQ9:       2/11/2025    11:51 AM 2/12/2025    10:28 AM 2/25/2025    10:58 AM 4/3/2025     9:37 AM 4/8/2025    11:29 AM 5/14/2025     9:06 AM 6/10/2025     1:13 PM   PHQ-9 SCORE   PHQ-9 Total Score MyChart 10 (Moderate depression)  11 (Moderate depression) 9 (Mild depression) 8 (Mild depression) 6 (Mild depression) 7 (Mild depression)   PHQ-9 Total Score 10  13 11  9  8  6  7        Patient-reported     GAD7:       7/6/2021    12:11 PM 3/28/2022     9:22 AM 5/17/2022     3:34 PM 4/11/2023     7:09 AM 6/6/2024    11:43 AM 2/12/2025    10:28 AM 2/21/2025    10:35 AM   MONIK-7 SCORE   Total Score  6 (mild anxiety) 7 (mild anxiety) 6 (mild anxiety) 7 (mild anxiety)  18 (severe anxiety)   Total Score 6 6 7 6 7 18 18        Patient-reported     PROMIS 10-Global Health (all questions and answers displayed):       2/9/2025     12:38 PM 2/12/2025    10:23 AM 2/21/2025    10:39 AM 5/23/2025    11:04 AM 6/6/2025    10:42 AM   PROMIS 10   In general, would you say your health is: Fair  Good Very good Good   In general, would you say your quality of life is: Good  Very good Very good Good   In general, how would you rate your physical health? Good  Very good Very good Very good   In general, how would you rate your mental health, including your mood and your ability to think? Poor  Good Good Good   In general, how would you rate your satisfaction with your social activities and relationships? Fair  Very good Good Very good   In general, please rate how well you carry out your usual social activities and roles Very good  Excellent Good Very good   To what extent are you able to carry out your everyday physical activities such as walking, climbing stairs, carrying groceries, or moving a chair? Completely  Completely Completely Completely   In the past 7 days, how often have you been bothered by emotional problems such as feeling anxious, depressed, or irritable? Often  Often Sometimes Sometimes   In the past 7 days, how would you rate your fatigue on average? Moderate  Moderate Moderate Moderate   In the past 7 days, how would you rate your pain on average, where 0 means no pain, and 10 means worst imaginable pain? 6  5 5 4   In general, would you say your health is: 2 3 3 4 3   In general, would you say your quality of life is: 3 3 4 4 3   In general, how would you rate your physical health? 3 3 4 4 4   In general, how would you rate your mental health, including your mood and your ability to think? 1 3 3 3 3   In general, how would you rate your satisfaction with your social activities and relationships? 2 4 4 3 4   In general, please rate how well you carry out your usual social activities and roles. (This includes activities at home, at work and in your community, and responsibilities as a parent, child, spouse, employee, friend, etc.) 4 3 5  "3 4   To what extent are you able to carry out your everyday physical activities such as walking, climbing stairs, carrying groceries, or moving a chair? 5 4 5 5 5   In the past 7 days, how often have you been bothered by emotional problems such as feeling anxious, depressed, or irritable? 4 4 4 3 3   In the past 7 days, how would you rate your fatigue on average? 3 4 3 3 3   In the past 7 days, how would you rate your pain on average, where 0 means no pain, and 10 means worst imaginable pain? 6 4 5 5 4   Global Mental Health Score 8  12 13  13  13    Global Physical Health Score 14  12 15  15  15    PROMIS TOTAL - SUBSCORES 22  24 28  28  28        Patient-reported     [place] Suicide Severity Rating Scale (Lifetime/Recent)      4/4/2024    12:10 PM 2/12/2025    10:17 AM 2/16/2025    12:33 AM 2/16/2025     9:32 AM 2/16/2025     6:09 PM 2/16/2025    10:00 PM   [place] Suicide Severity Rating (Lifetime/Recent)   Q1 Wished to be Dead (Past Month) 0-->no  0-->no 0-->no 0-->no 0-->no   Q2 Suicidal Thoughts (Past Month) 0-->no  0-->no 0-->no 0-->no 0-->no   Q6 Suicide Behavior (Lifetime) 0-->no  0-->no 0-->no 0-->no 0-->no   Level of Risk per Screen no risks indicated  no risks indicated no risks indicated no risks indicated no risks indicated   1. Wish to be Dead (Lifetime)  Y       Wish to be Dead Description (Lifetime)  \"I do not feel worthy\" \" I do not feel that I deserve to be here.\"       1. Wish to be Dead (Past 1 Month)  N       2. Non-Specific Active Suicidal Thoughts (Lifetime)  N       Most Severe Ideation Rating (Lifetime)  3       Description of Most Severe Ideation (Lifetime)  \"I am going to be in pain forever.\"       Most Severe Ideation Rating (Past 1 Month)  1       Frequency (Lifetime)  2       Frequency (Past 1 Month)  1       Duration (Lifetime)  1       Duration (Past 1 Month)  1       Controllability (Lifetime)  1       Controllability (Past 1 Month)  1       Deterrents (Lifetime)  1     "   Deterrents (Past 1 Month)  1       Reasons for Ideation (Lifetime)  5       Reasons for Ideation (Past 1 Month)  1       Actual Attempt (Lifetime)  N       Has subject engaged in non-suicidal self-injurious behavior? (Lifetime)  N       Interrupted Attempts (Lifetime)  N       Aborted or Self-Interrupted Attempt (Lifetime)  N       Preparatory Acts or Behavior (Lifetime)  N       Calculated C-SSRS Risk Score (Lifetime/Recent)  No Risk Indicated             ASSESSMENT: Current Emotional / Mental Status (status of significant symptoms):   Risk status (Self / Other harm or suicidal ideation)   Patient denies current fears or concerns for personal safety.   Patient denies current or recent suicidal ideation or behaviors.   Patient denies current or recent homicidal ideation or behaviors.   Patient denies current or recent self injurious behavior or ideation.   Patient denies other safety concerns.   Patient reports there has been no change in risk factors since their last session.     Patient reports there has been no change in protective factors since their last session.     Recommended that patient call 911 or go to the local ED should there be a change in any of these risk factors     Appearance:   Appropriate    Eye Contact:   Good    Psychomotor Behavior: Normal    Attitude:   Cooperative  Friendly Attentive   Orientation:   All   Speech    Rate / Production: Normal     Volume:  Normal    Mood:    Euthymic   Affect:    Appropriate    Thought Content:  Clear    Thought Form:  Coherent  Logical    Insight:    Good      Medication Review:   No changes to current psychiatric medication(s)     Medication Compliance:   Yes     Changes in Health Issues:   None reported     Chemical Use Review:   Substance Use: Chemical use reviewed, no active concerns identified      Tobacco Use: No current tobacco use.      Diagnosis:  1. Moderate episode of recurrent major depressive disorder (H)    2. Generalized anxiety disorder               Collateral Reports Completed:   Not Applicable    PLAN: (Patient Tasks / Therapist Tasks / Other)    Patient continues adding coping skills to his day  Patient continues developing relationships that support him  Patient continues working outside to support his mental and physical health.      Manjinder BRUNO Caro                                                         ______________________________________________________________________    Individual Treatment Plan    Patient's Name: Viet Beebe  YOB: 1985    Date of Creation: 2/26/2025  Date Treatment Plan Last Reviewed/Revised: 2/26/2025    DSM5 Diagnoses: 300.02 (F41.1) Generalized Anxiety Disorder  Psychosocial / Contextual Factors: Patient has stopped using alcohol and marijuana and has been experiencing an increase in anxiety.  PROMIS (reviewed every 90 days): PROMIS-10 Scores        2/21/2025    10:39 AM 5/23/2025    11:04 AM 6/6/2025    10:42 AM   PROMIS-10 Total Score w/o Sub Scores   PROMIS TOTAL - SUBSCORES 28  28  28        Patient-reported         Referral / Collaboration:  Referral to another professional/service is not indicated at this time..    Anticipated number of session for this episode of care: 20-30 sessions  Anticipation frequency of session: Weekly  Anticipated Duration of each session: 38-52 minutes  Treatment plan will be reviewed in 90 days or when goals have been changed.       MeasurableTreatment Goal(s) related to diagnosis / functional impairment(s)  Goal 1: Patient will like to manage his anxiety    I will know I've met my goal when my anxiety goes down.      Objective #A (Patient Action)    Patient will set boundaries at work.  Status: New - Date: 2/26/2025     Intervention(s)  Therapist will teach about healthy boundaries.  .    Objective #B  Patient will name and express his feelings.  Status: New - Date: 2/26/2025     Intervention(s)  Therapist will assign homework Patient will use emotions  kimberly.    Patient has reviewed and agreed to the above plan.      Manjinder BRUNO Caro  February 26, 2025

## 2025-06-25 ENCOUNTER — VIRTUAL VISIT (OUTPATIENT)
Dept: PSYCHOLOGY | Facility: CLINIC | Age: 40
End: 2025-06-25
Payer: COMMERCIAL

## 2025-06-25 DIAGNOSIS — F41.1 GENERALIZED ANXIETY DISORDER: ICD-10-CM

## 2025-06-25 DIAGNOSIS — F33.1 MODERATE EPISODE OF RECURRENT MAJOR DEPRESSIVE DISORDER (H): Primary | ICD-10-CM

## 2025-06-25 PROCEDURE — 90834 PSYTX W PT 45 MINUTES: CPT | Mod: 95

## 2025-06-25 NOTE — PROGRESS NOTES
[facility]                                     Progress Note    Patient Name: Viet Beebe  Date: 6/25/25           Service Type: Individual      Session Start Time: 10:03: am  Session End Time: 10:41 am     Session Length: 38 minutes    Session #: 17    Attendees: Client attended alone    Service Modality:  Video Visit:      Provider verified identity through the following two step process.  Patient provided:  Patient is known previously to provider    Telemedicine Visit: The patient's condition can be safely assessed and treated via synchronous audio and visual telemedicine encounter.      Reason for Telemedicine Visit: Services only offered telehealth    Originating Site (Patient Location): Patient's home    Distant Site (Provider Location): Provider Remote Setting- Home Office    Consent:  The patient/guardian has verbally consented to: the potential risks and benefits of telemedicine (video visit) versus in person care; bill my insurance or make self-payment for services provided; and responsibility for payment of non-covered services.     Patient would like the video invitation sent by:  Send to e-mail at: [email or web address]    Mode of Communication:  Video Conference via [facility]    Distant Location (Provider):  Off-site    As the provider I attest to compliance with applicable laws and regulations related to telemedicine.    DATA  Interactive Complexity: No  Crisis: No        Progress Since Last Session (Related to Symptoms / Goals / Homework):   Symptoms: Improving Patient has been feeling better, mood is improved.      Homework: Achieved / completed to satisfaction      Episode of Care Goals: Satisfactory progress - ACTION (Actively working towards change); Intervened by reinforcing change plan / affirming steps taken     Current / Ongoing Stressors and Concerns:   Pt reports ongoing stressors as navigating various relationships and roles in different areas of life. Pt shared events  that took place since last session and processed emotions. Pt continues to improve and solidify healthy physical and emotional habits.  He has noticed how much house projects keep him healthy physically and mentally.  His eating and sleeping is stable.    Treatment Objective(s) Addressed in This Session:   Increase interest, engagement, and pleasure in doing things  Decrease frequency and intensity of feeling down, depressed, hopeless  Identify negative self-talk and behaviors: challenge core beliefs, myths, and actions  Improve concentration, focus, and mindfulness in daily activities .       Intervention:   CBT: Therapist addressed distorted feelings and modeled modeling anxious and distorted thoughts.  Motivational Interviewing: Asking questions about reasons for communicating feelings and becoming more assertive.    Assessments completed prior to visit:  The following assessments were completed by patient for this visit:  PHQ9:       2/11/2025    11:51 AM 2/12/2025    10:28 AM 2/25/2025    10:58 AM 4/3/2025     9:37 AM 4/8/2025    11:29 AM 5/14/2025     9:06 AM 6/10/2025     1:13 PM   PHQ-9 SCORE   PHQ-9 Total Score MyChart 10 (Moderate depression)  11 (Moderate depression) 9 (Mild depression) 8 (Mild depression) 6 (Mild depression) 7 (Mild depression)   PHQ-9 Total Score 10  13 11  9  8  6  7        Patient-reported     GAD7:       7/6/2021    12:11 PM 3/28/2022     9:22 AM 5/17/2022     3:34 PM 4/11/2023     7:09 AM 6/6/2024    11:43 AM 2/12/2025    10:28 AM 2/21/2025    10:35 AM   MONIK-7 SCORE   Total Score  6 (mild anxiety) 7 (mild anxiety) 6 (mild anxiety) 7 (mild anxiety)  18 (severe anxiety)   Total Score 6 6 7 6 7 18 18        Patient-reported     PROMIS 10-Global Health (all questions and answers displayed):       2/9/2025    12:38 PM 2/12/2025    10:23 AM 2/21/2025    10:39 AM 5/23/2025    11:04 AM 6/6/2025    10:42 AM   PROMIS 10   In general, would you say your health is: Fair  Good Very good Good    In general, would you say your quality of life is: Good  Very good Very good Good   In general, how would you rate your physical health? Good  Very good Very good Very good   In general, how would you rate your mental health, including your mood and your ability to think? Poor  Good Good Good   In general, how would you rate your satisfaction with your social activities and relationships? Fair  Very good Good Very good   In general, please rate how well you carry out your usual social activities and roles Very good  Excellent Good Very good   To what extent are you able to carry out your everyday physical activities such as walking, climbing stairs, carrying groceries, or moving a chair? Completely  Completely Completely Completely   In the past 7 days, how often have you been bothered by emotional problems such as feeling anxious, depressed, or irritable? Often  Often Sometimes Sometimes   In the past 7 days, how would you rate your fatigue on average? Moderate  Moderate Moderate Moderate   In the past 7 days, how would you rate your pain on average, where 0 means no pain, and 10 means worst imaginable pain? 6  5 5 4   In general, would you say your health is: 2 3 3 4 3   In general, would you say your quality of life is: 3 3 4 4 3   In general, how would you rate your physical health? 3 3 4 4 4   In general, how would you rate your mental health, including your mood and your ability to think? 1 3 3 3 3   In general, how would you rate your satisfaction with your social activities and relationships? 2 4 4 3 4   In general, please rate how well you carry out your usual social activities and roles. (This includes activities at home, at work and in your community, and responsibilities as a parent, child, spouse, employee, friend, etc.) 4 3 5 3 4   To what extent are you able to carry out your everyday physical activities such as walking, climbing stairs, carrying groceries, or moving a chair? 5 4 5 5 5   In the past  "7 days, how often have you been bothered by emotional problems such as feeling anxious, depressed, or irritable? 4 4 4 3 3   In the past 7 days, how would you rate your fatigue on average? 3 4 3 3 3   In the past 7 days, how would you rate your pain on average, where 0 means no pain, and 10 means worst imaginable pain? 6 4 5 5 4   Global Mental Health Score 8  12 13  13  13    Global Physical Health Score 14  12 15  15  15    PROMIS TOTAL - SUBSCORES 22  24 28  28  28        Patient-reported     [place] Suicide Severity Rating Scale (Lifetime/Recent)      4/4/2024    12:10 PM 2/12/2025    10:17 AM 2/16/2025    12:33 AM 2/16/2025     9:32 AM 2/16/2025     6:09 PM 2/16/2025    10:00 PM   [place] Suicide Severity Rating (Lifetime/Recent)   Q1 Wished to be Dead (Past Month) 0-->no  0-->no 0-->no 0-->no 0-->no   Q2 Suicidal Thoughts (Past Month) 0-->no  0-->no 0-->no 0-->no 0-->no   Q6 Suicide Behavior (Lifetime) 0-->no  0-->no 0-->no 0-->no 0-->no   Level of Risk per Screen no risks indicated   no risks indicated  no risks indicated  no risks indicated  no risks indicated    1. Wish to be Dead (Lifetime)  Y       Wish to be Dead Description (Lifetime)  \"I do not feel worthy\" \" I do not feel that I deserve to be here.\"       1. Wish to be Dead (Past 1 Month)  N       2. Non-Specific Active Suicidal Thoughts (Lifetime)  N       Most Severe Ideation Rating (Lifetime)  3       Description of Most Severe Ideation (Lifetime)  \"I am going to be in pain forever.\"       Most Severe Ideation Rating (Past 1 Month)  1       Frequency (Lifetime)  2       Frequency (Past 1 Month)  1       Duration (Lifetime)  1       Duration (Past 1 Month)  1       Controllability (Lifetime)  1       Controllability (Past 1 Month)  1       Deterrents (Lifetime)  1       Deterrents (Past 1 Month)  1       Reasons for Ideation (Lifetime)  5       Reasons for Ideation (Past 1 Month)  1       Actual Attempt (Lifetime)  N       Has subject engaged in " non-suicidal self-injurious behavior? (Lifetime)  N       Interrupted Attempts (Lifetime)  N       Aborted or Self-Interrupted Attempt (Lifetime)  N       Preparatory Acts or Behavior (Lifetime)  N       Calculated C-SSRS Risk Score (Lifetime/Recent)  No Risk Indicated           Data saved with a previous flowsheet row definition         ASSESSMENT: Current Emotional / Mental Status (status of significant symptoms):   Risk status (Self / Other harm or suicidal ideation)   Patient denies current fears or concerns for personal safety.   Patient denies current or recent suicidal ideation or behaviors.   Patient denies current or recent homicidal ideation or behaviors.   Patient denies current or recent self injurious behavior or ideation.   Patient denies other safety concerns.   Patient reports there has been no change in risk factors since their last session.     Patient reports there has been no change in protective factors since their last session.     Recommended that patient call 911 or go to the local ED should there be a change in any of these risk factors     Appearance:   Appropriate    Eye Contact:   Good    Psychomotor Behavior: Normal    Attitude:   Cooperative  Friendly Attentive   Orientation:   All   Speech    Rate / Production: Normal     Volume:  Normal    Mood:    Euthymic   Affect:    Appropriate    Thought Content:  Clear    Thought Form:  Coherent  Logical    Insight:    Good      Medication Review:   No changes to current psychiatric medication(s)     Medication Compliance:   Yes     Changes in Health Issues:   None reported     Chemical Use Review:   Substance Use: Chemical use reviewed, no active concerns identified      Tobacco Use: No current tobacco use.      Diagnosis:  1. Moderate episode of recurrent major depressive disorder (H)    2. Generalized anxiety disorder                Collateral Reports Completed:   Not Applicable    PLAN: (Patient Tasks / Therapist Tasks / Other)    Patient  will continue working on projects in the house that keep him in good shape physically  Patient will maintain loving relationships with family and friends      Manjinder BRUNO Caro                                                         ______________________________________________________________________    Individual Treatment Plan    Patient's Name: Viet Beebe  YOB: 1985    Date of Creation: 2/26/2025  Date Treatment Plan Last Reviewed/Revised: 2/26/2025    DSM5 Diagnoses: 300.02 (F41.1) Generalized Anxiety Disorder  Psychosocial / Contextual Factors: Patient has stopped using alcohol and marijuana and has been experiencing an increase in anxiety.  PROMIS (reviewed every 90 days): PROMIS-10 Scores        2/21/2025    10:39 AM 5/23/2025    11:04 AM 6/6/2025    10:42 AM   PROMIS-10 Total Score w/o Sub Scores   PROMIS TOTAL - SUBSCORES 28  28  28        Patient-reported         Referral / Collaboration:  Referral to another professional/service is not indicated at this time..    Anticipated number of session for this episode of care: 20-30 sessions  Anticipation frequency of session: Weekly  Anticipated Duration of each session: 38-52 minutes  Treatment plan will be reviewed in 90 days or when goals have been changed.       MeasurableTreatment Goal(s) related to diagnosis / functional impairment(s)  Goal 1: Patient will like to manage his anxiety    I will know I've met my goal when my anxiety goes down.      Objective #A (Patient Action)    Patient will set boundaries at work.  Status: New - Date: 2/26/2025     Intervention(s)  Therapist will teach about healthy boundaries.  .    Objective #B  Patient will name and express his feelings.  Status: New - Date: 2/26/2025     Intervention(s)  Therapist will assign homework Patient will use emotions wheel.    Patient has reviewed and agreed to the above plan.      Manjinder BRUNO Caro  February 26, 2025

## 2025-07-07 ENCOUNTER — OFFICE VISIT (OUTPATIENT)
Dept: OTOLARYNGOLOGY | Facility: CLINIC | Age: 40
End: 2025-07-07
Attending: FAMILY MEDICINE
Payer: COMMERCIAL

## 2025-07-07 VITALS
TEMPERATURE: 97.5 F | WEIGHT: 185 LBS | DIASTOLIC BLOOD PRESSURE: 82 MMHG | SYSTOLIC BLOOD PRESSURE: 121 MMHG | HEART RATE: 74 BPM | OXYGEN SATURATION: 99 % | BODY MASS INDEX: 27.32 KG/M2

## 2025-07-07 DIAGNOSIS — J34.2 NASAL SEPTAL DEVIATION: Primary | ICD-10-CM

## 2025-07-07 PROCEDURE — 99203 OFFICE O/P NEW LOW 30 MIN: CPT

## 2025-07-07 PROCEDURE — 3079F DIAST BP 80-89 MM HG: CPT

## 2025-07-07 PROCEDURE — 3074F SYST BP LT 130 MM HG: CPT

## 2025-07-07 PROCEDURE — 1125F AMNT PAIN NOTED PAIN PRSNT: CPT

## 2025-07-07 ASSESSMENT — PATIENT HEALTH QUESTIONNAIRE - PHQ9: SUM OF ALL RESPONSES TO PHQ QUESTIONS 1-9: 6

## 2025-07-07 ASSESSMENT — PAIN SCALES - GENERAL: PAINLEVEL_OUTOF10: MILD PAIN (2)

## 2025-07-07 NOTE — PROGRESS NOTES
Chief Complaint   Patient presents with    Consult     Nasal pain, dryness causing sores. With sinus pressure and swollen glands     History of Present Illness   Viet Beebe is a 40 year old male who presents for evaluation. Referred by Dr.Joel Daniel Wegener for concerns of nasal obstruction and rhinitis.     Viet, had his routine physical back on 06/10/25. It was notes the he has rhinitis medicamentosa. Recommending stopping the Oxymetazolone. The patient reported that he had been using Oxymetazolone majority of the nights during the week so he could use his CPAP mach. He does not feel like he has allergies. Referred to ENT for concerns of nasal obstruction if symptoms do not subside after stopping the Oxymetazolone.    He tells me that his nose is always dry and develops sores in the nostrils. He describes itching in the back of his throat and ears. He doesn't have any snot, but it is like a gel like substance.     The patient describes symptoms of sinus infection for the past 15 years. The patient notes no history of environmental allergies. They have  been tested for allergies in the past. Treatments have included nasal irrigations, nasal steroids (Flonase, Afrin), Vaseline (1 or more times a day), and oral antihistamines (Claritin). The treatments seem to not work. No history of nasal trauma. The patient reports nasal obstruction right greater than left, nasal congestion, no rhinorrhea, unsure if he has a, but he doesn't feel he has a post nasal drainage,no taste/smell disturbance, face pain/pressure/fullness. No history of epistaxis. No prior history of nose or sinus surgery. Former history of smoking. He has a history of migraine headache. No history of asthma. No history of aspirin/NSAID sensitivity. History of GERD, taking Prilosec 20 mg capsules.     No previous nose or sinus imaging.       Past Medical History  Patient Active Problem List   Diagnosis    CARDIOVASCULAR SCREENING; LDL GOAL LESS THAN 160     Chronic daily headache    Pre-syncope    Anxiety and depression    Snoring    Major depression    Hypertension goal BP (blood pressure) < 130/80    YVAN (obstructive sleep apnea)    Moderate episode of recurrent major depressive disorder (H)    Migraine with aura and without status migrainosus, not intractable    Gastroesophageal reflux disease without esophagitis    Generalized anxiety disorder    Psychosis (H)    Severe recurrent major depression with psychotic features (H)    Moderate major depression, single episode (H)     Current Medications     Current Outpatient Medications:     lisinopril-hydrochlorothiazide (ZESTORETIC) 20-25 MG tablet, Take 1 tablet by mouth daily., Disp: 90 tablet, Rfl: 3    omeprazole (PRILOSEC) 20 MG DR capsule, TAKE 1 CAPSULE BY MOUTH 30 TO 60 MINUTES BEFORE A MEAL, Disp: 90 capsule, Rfl: 3    polyethylene glycol (MIRALAX) 17 g packet, Take 1 packet by mouth daily as needed for constipation., Disp: , Rfl:     sertraline (ZOLOFT) 100 MG tablet, Take 1.5 tablets (150 mg) by mouth daily., Disp: 135 tablet, Rfl: 3    SUMAtriptan (IMITREX) 50 MG tablet, TAKE 1 TABLET AT ONSET OF HEADACHE FOR MIGRAINE MAY REPEAT IN 2 HOURS. MAXIMUM 4 TABLETS IN 24 HOURS, Disp: 18 tablet, Rfl: 11    Allergies  No Known Allergies    Social History   Social History     Socioeconomic History    Marital status: Single    Number of children: 0    Years of education: 17   Occupational History    Occupation: Electrical Enginner     Employer: [employer]   Tobacco Use    Smoking status: Former     Current packs/day: 0.00     Types: Cigarettes     Quit date: 1/1/2010     Years since quitting: 15.5     Passive exposure: Never    Smokeless tobacco: Never   Vaping Use    Vaping status: Never Used   Substance and Sexual Activity    Alcohol use: Yes     Comment: Drinks over rhe w/e. 5 drinks weekly    Drug use: Never     Comment: Marihuana every 3rd day.    Sexual activity: Not Currently     Partners: Female     Birth  control/protection: Condom   Other Topics Concern    Parent/sibling w/ CABG, MI or angioplasty before 65F 55M? No     Social Drivers of Health     Financial Resource Strain: Low Risk  (6/6/2025)    Financial Resource Strain     Within the past 12 months, have you or your family members you live with been unable to get utilities (heat, electricity) when it was really needed?: No   Food Insecurity: Low Risk  (6/6/2025)    Food Insecurity     Within the past 12 months, did you worry that your food would run out before you got money to buy more?: No     Within the past 12 months, did the food you bought just not last and you didn t have money to get more?: No   Transportation Needs: Low Risk  (6/6/2025)    Transportation Needs     Within the past 12 months, has lack of transportation kept you from medical appointments, getting your medicines, non-medical meetings or appointments, work, or from getting things that you need?: No   Physical Activity: Sufficiently Active (6/6/2025)    Exercise Vital Sign     Days of Exercise per Week: 3 days     Minutes of Exercise per Session: 50 min   Stress: Stress Concern Present (6/6/2025)    [employer] - Occupational Stress Questionnaire     Feeling of Stress : To some extent   Social Connections: Unknown (6/6/2025)    Social Connection and Isolation Panel [NHANES]     Frequency of Social Gatherings with Friends and Family: Once a week   Interpersonal Safety: Low Risk  (6/10/2025)    Interpersonal Safety     Do you feel physically and emotionally safe where you currently live?: Yes     Within the past 12 months, have you been hit, slapped, kicked or otherwise physically hurt by someone?: No     Within the past 12 months, have you been humiliated or emotionally abused in other ways by your partner or ex-partner?: No   Housing Stability: Low Risk  (6/6/2025)    Housing Stability     Do you have housing? : Yes     Are you worried about losing your housing?:  No       Family History  Family History   Problem Relation Age of Onset    Skin Cancer Mother     Cancer Maternal Grandmother     Heart Disease Maternal Grandfather         MI    Cerebrovascular Disease Maternal Grandfather 40    Alzheimer Disease Paternal Grandfather 70        Alzheimers       Review of Systems  As per HPI and PMHx, otherwise 10+ comprehensive system review is negative.    Physical Exam  /82   Pulse 74   Temp 97.5  F (36.4  C)   Wt 83.9 kg (185 lb)   SpO2 99%   BMI 27.32 kg/m    GENERAL: The patient is a pleasant, cooperative 40 year old male in no acute distress.  HEAD: Normocephalic, atraumatic. Hair and scalp are normal.  EYES: Pupils are equal, round, reactive to light and accommodation. Extraocular movements are intact. The sclera nonicteric without injection. The extraocular structures are normal.  EARS: Normal shape and symmetry. No tenderness when palpating the mastoid or tragal areas bilaterally. No mastoid erythema or fluctuance. Otoscopic exam on the right reveals no amount of cerumen. The right tympanic membrane is round, intact without evidence of effusion, good landmarks.  No retraction, granulation, or drainage. Otoscopic exam on the left reveals no amount of cerumen. The left tympanic membrane is round, intact without evidence of effusion, good landmarks.  No retraction, granulation, or drainage.  NOSE: Nares are patent.  Nasal mucosa is pink and dry. Septal deviation to the left with possible nasal valve collapse (modified caudal maneuver is positive).  ORAL CAVITY: Lips are normal. Dentition is in good repair. Mucous membranes are moist. Tongue is mobile, protrudes to the midline.  Palate elevates symmetrically. Tonsils are +0. No erythema or exudate. No oral cavity or oropharyngeal masses, lesions, ulcerations, or leukoplakia.  NECK: Supple, trachea is midline. There is no palpable cervical lymphadenopathy or masses bilaterally. Palpation of the bilateral parotid and  submandibular areas reveal no masses. No thyromegaly.    NEUROLOGIC: Cranial nerves II through XII are grossly intact. Voice is strong. Patient is House-Brackmann I/VI bilaterally.  CARDIOVASCULAR: Extremities are warm and well-perfused. No significant peripheral edema.  RESPIRATORY: Patient has nonlabored breathing without cough, wheeze, stridor.  PSYCHIATRIC: Patient is alert and oriented. Mood and affect appear normal.  SKIN: Warm and dry. No scalp, face, or neck lesions noted.    Assessment and Plan     ICD-10-CM    1. Nasal septal deviation  J34.2         It was my pleasure seeing Viet Beebe today in clinic. The patient presents to clinic for concerns of recurrent sinus infections. There is evidence of a  septal deviation with a potential nasal valve collapse that may have occurred from past nasal trauma due to wrestling. We discussed getting a CT scan to rule out any other abnormalities within the sinuses. He will complete the Afrin/Breath test this week. I will reach out at the end of the week to see how the test went, then we will determine which ENT surgeon she should see in the future.     Afrin/Breathe Right Test    Obtain oxymetazoline (Afrin) nasal spray and Breathe Right nasal strips. Oxymetazoline (Afrin) and Breathe Right strips are available over the counter.  Do the following over 3 nights:    Night 1: Place 2 oxymetazoline (Afrin) sprays in each nostril within 30 minutes of bedtime.  Note how your nasal breathing is for sleep.    Night 2: Placed the Breathe Right nasal strips on the external part of your nose before bedtime, do not use Afrin tonight.  Note how your nasal breathing is for sleep.     Night 3: Place 2 oxymetazoline (Afrin) sprays in each nostril within 30 minutes of bedtime and use the Breathe Right nasal strip before bed.  Note how your nasal breathing is for sleep.    Do not use the Afrin nasal spray more than 3 nights in a row.    CLIFF Dailey  CNP  Otolaryngology  [place] [facility] [place]

## 2025-07-07 NOTE — PROGRESS NOTES
Sino-Nasal Outcome Test (SNOT - 22)    1. Need to Blow Nose: (Patient-Rptd) (P) Severe  2. Nasal Blockage: (Patient-Rptd) (P) Severe  3. Sneezing: (Patient-Rptd) (P) Moderate  4. Runny Nose: (Patient-Rptd) (P) Very mild  5. Cough: (Patient-Rptd) (P) None  6. Post-nasal discharge: (Patient-Rptd) (P) Very mild  7. Thick nasal discharge: (Patient-Rptd) (P) Moderate  8. Ear fullness: (Patient-Rptd) (P) Mild or slight  9. Dizziness: (Patient-Rptd) (P) Very mild  10. Ear Pain: (Patient-Rptd) (P) Very mild  11. Facial pain/pressure: (Patient-Rptd) (P) Severe  12. Decreased Sense of Smell/Taste: (Patient-Rptd) (P) Moderate  13. Difficulty falling asleep: (Patient-Rptd) (P) Severe  14. Wake up at night: (Patient-Rptd) (P) Moderate  15. Lack of a good night's sleep: (Patient-Rptd) (P) Severe  16. Wake up tired: (Patient-Rptd) (P) Severe  17. Fatigue: (Patient-Rptd) (P) Moderate  18. Reduced Productivity: (Patient-Rptd) (P) Moderate  19. Reduced Concentration: (Patient-Rptd) (P) Mild or slight  20. Frustrated/restless/irritable: (Patient-Rptd) (P) Moderate  21. Sad: (Patient-Rptd) (P) Mild or slight  22. Embarrassed: (Patient-Rptd) (P) Mild or slight    Total Score: (Patient-Rptd) (P) 57    COPYRIGHT 1996. [facility] [place]

## 2025-07-07 NOTE — PATIENT INSTRUCTIONS
You were seen by Eddie Chery CNP.  If you have questions or concerns regarding your appointment today, you can reach out to our call center at [phone].  The following has been recommended at your appointment today:    Schedule the CT scan of the sinuses. The scheduling team will reach out to schedule.   I will message through [employer] with results.   Complete the breath test below.   I will reach out through [employer] to see what the results are.   This helps me rule out if the turbinates found in the nostrils are enlarged or if you truly have a nasal valve collapse. This can help me determine which ENT doctor you should see next.   It is hard to determine if you have ring worm in your nostrils without any sores present today. From the sounds of it you have dry nasal passages which can cause scabbing in the tip of your nose.     Nasal Dryness Prevention:   Apply Ayr Nasal Gel, Aquaphor or Vaseline around the base of the nostril openings to help prevent nasal dryness.   Apply a pea size amount around the base of the nostrils 2-3 times daily to promote nasal hydration.   When the tissues become dry, then blood vessels come to the surface of the tissue and can lead to nosebleeds.   Use just your fingers, no Q-tips, and spread a thin layer just inside your nostrils.  Use Ayr Nasal mist/Ocean Saline Spray  Use saline spray regularly. Point spray tip towards your septum and then point out towards the other side of your nose.  You may use a humidifier to provide moisture to the air to prevent nasal dyness.     Afrin/Breathe Right Test    Obtain oxymetazoline (Afrin) nasal spray and Breathe Right nasal strips. Oxymetazoline (Afrin) and Breathe Right strips are available over the counter.  Do the following over 3 nights:    Night 1: Place 2 oxymetazoline (Afrin) sprays in each nostril within 30 minutes of bedtime.  Note how your nasal breathing is for sleep.    Night 2: Placed the Breathe Right nasal strips on the  external part of your nose before bedtime, do not use Afrin tonight.  Note how your nasal breathing is for sleep.     Night 3: Place 2 oxymetazoline (Afrin) sprays in each nostril within 30 minutes of bedtime and use the Breathe Right nasal strip before bed.  Note how your nasal breathing is for sleep.    Do not use the Afrin nasal spray more than 3 nights in a row.

## 2025-07-07 NOTE — LETTER
7/7/2025      Viet Beebe  [address] [place] [address]      Dear Colleague,    Thank you for referring your patient, Viet Beebe, to the [facility]. Please see a copy of my visit note below.    Chief Complaint   Patient presents with     Consult     Nasal pain, dryness causing sores. With sinus pressure and swollen glands     History of Present Illness   Viet Beebe is a 40 year old male who presents for evaluation. Referred by Dr.Joel Daniel Wegener for concerns of nasal obstruction and rhinitis.     Viet had his routine physical back on 06/10/25. It was notes the he has rhinitis medicamentosa. Recommending stopping the Oxymetazolone. The patient reported that he had been using Oxymetazolone majority of the nights during the week so he could use his CPAP mach. He does not feel like he has allergies. Referred to ENT for concerns of nasal obstruction if symptoms do not subside after stopping the Oxymetazolone.    He tells me that his nose is always dry and develops sores in the nostrils. He describes itching in the back of his throat and ears. He doesn't have any snot, but it is like a gel like substance.     The patient describes symptoms of sinus infection for the past 15 years. The patient notes no history of environmental allergies. They have  been tested for allergies in the past. Treatments have included nasal irrigations, nasal steroids (Flonase, Afrin), Vaseline (1 or more times a day), and oral antihistamines (Claritin). The treatments seem to not work. No history of nasal trauma. The patient reports nasal obstruction right greater than left, nasal congestion, no rhinorrhea, unsure if he has a, but he doesn't feel he has a post nasal drainage,no taste/smell disturbance, face pain/pressure/fullness. No history of epistaxis. No prior history of nose or sinus surgery. Former history of smoking. He has a history of migraine headache. No history of asthma. No history of  aspirin/NSAID sensitivity. History of GERD, taking Prilosec 20 mg capsules.     No previous nose or sinus imaging.       Past Medical History  Patient Active Problem List   Diagnosis     CARDIOVASCULAR SCREENING; LDL GOAL LESS THAN 160     Chronic daily headache     Pre-syncope     Anxiety and depression     Snoring     Major depression     Hypertension goal BP (blood pressure) < 130/80     YVAN (obstructive sleep apnea)     Moderate episode of recurrent major depressive disorder (H)     Migraine with aura and without status migrainosus, not intractable     Gastroesophageal reflux disease without esophagitis     Generalized anxiety disorder     Psychosis (H)     Severe recurrent major depression with psychotic features (H)     Moderate major depression, single episode (H)     Current Medications     Current Outpatient Medications:      lisinopril-hydrochlorothiazide (ZESTORETIC) 20-25 MG tablet, Take 1 tablet by mouth daily., Disp: 90 tablet, Rfl: 3     omeprazole (PRILOSEC) 20 MG DR capsule, TAKE 1 CAPSULE BY MOUTH 30 TO 60 MINUTES BEFORE A MEAL, Disp: 90 capsule, Rfl: 3     polyethylene glycol (MIRALAX) 17 g packet, Take 1 packet by mouth daily as needed for constipation., Disp: , Rfl:      sertraline (ZOLOFT) 100 MG tablet, Take 1.5 tablets (150 mg) by mouth daily., Disp: 135 tablet, Rfl: 3     SUMAtriptan (IMITREX) 50 MG tablet, TAKE 1 TABLET AT ONSET OF HEADACHE FOR MIGRAINE MAY REPEAT IN 2 HOURS. MAXIMUM 4 TABLETS IN 24 HOURS, Disp: 18 tablet, Rfl: 11    Allergies  No Known Allergies    Social History   Social History     Socioeconomic History     Marital status: Single     Number of children: 0     Years of education: 17   Occupational History     Occupation: Electrical Enginner     Employer: [employer]   Tobacco Use     Smoking status: Former     Current packs/day: 0.00     Types: Cigarettes     Quit date: 1/1/2010     Years since quitting: 15.5     Passive exposure: Never     Smokeless tobacco: Never   Vaping Use      Vaping status: Never Used   Substance and Sexual Activity     Alcohol use: Yes     Comment: Drinks over rhe w/e. 5 drinks weekly     Drug use: Never     Comment: Marihuana every 3rd day.     Sexual activity: Not Currently     Partners: Female     Birth control/protection: Condom   Other Topics Concern     Parent/sibling w/ CABG, MI or angioplasty before 65F 55M? No     Social Drivers of Health     Financial Resource Strain: Low Risk  (6/6/2025)    Financial Resource Strain      Within the past 12 months, have you or your family members you live with been unable to get utilities (heat, electricity) when it was really needed?: No   Food Insecurity: Low Risk  (6/6/2025)    Food Insecurity      Within the past 12 months, did you worry that your food would run out before you got money to buy more?: No      Within the past 12 months, did the food you bought just not last and you didn t have money to get more?: No   Transportation Needs: Low Risk  (6/6/2025)    Transportation Needs      Within the past 12 months, has lack of transportation kept you from medical appointments, getting your medicines, non-medical meetings or appointments, work, or from getting things that you need?: No   Physical Activity: Sufficiently Active (6/6/2025)    Exercise Vital Sign      Days of Exercise per Week: 3 days      Minutes of Exercise per Session: 50 min   Stress: Stress Concern Present (6/6/2025)    [employer] - Occupational Stress Questionnaire      Feeling of Stress : To some extent   Social Connections: Unknown (6/6/2025)    Social Connection and Isolation Panel [NHANES]      Frequency of Social Gatherings with Friends and Family: Once a week   Interpersonal Safety: Low Risk  (6/10/2025)    Interpersonal Safety      Do you feel physically and emotionally safe where you currently live?: Yes      Within the past 12 months, have you been hit, slapped, kicked or otherwise physically hurt by someone?: No       Within the past 12 months, have you been humiliated or emotionally abused in other ways by your partner or ex-partner?: No   Housing Stability: Low Risk  (6/6/2025)    Housing Stability      Do you have housing? : Yes      Are you worried about losing your housing?: No       Family History  Family History   Problem Relation Age of Onset     Skin Cancer Mother      Cancer Maternal Grandmother      Heart Disease Maternal Grandfather         MI     Cerebrovascular Disease Maternal Grandfather 40     Alzheimer Disease Paternal Grandfather 70        Alzheimers       Review of Systems  As per HPI and PMHx, otherwise 10+ comprehensive system review is negative.    Physical Exam  /82   Pulse 74   Temp 97.5  F (36.4  C)   Wt 83.9 kg (185 lb)   SpO2 99%   BMI 27.32 kg/m    GENERAL: The patient is a pleasant, cooperative 40 year old male in no acute distress.  HEAD: Normocephalic, atraumatic. Hair and scalp are normal.  EYES: Pupils are equal, round, reactive to light and accommodation. Extraocular movements are intact. The sclera nonicteric without injection. The extraocular structures are normal.  EARS: Normal shape and symmetry. No tenderness when palpating the mastoid or tragal areas bilaterally. No mastoid erythema or fluctuance. Otoscopic exam on the right reveals no amount of cerumen. The right tympanic membrane is round, intact without evidence of effusion, good landmarks.  No retraction, granulation, or drainage. Otoscopic exam on the left reveals no amount of cerumen. The left tympanic membrane is round, intact without evidence of effusion, good landmarks.  No retraction, granulation, or drainage.  NOSE: Nares are patent.  Nasal mucosa is pink and dry. Septal deviation to the left with possible nasal valve collapse (modified caudal maneuver is positive).  ORAL CAVITY: Lips are normal. Dentition is in good repair. Mucous membranes are moist. Tongue is mobile, protrudes to the midline.  Palate elevates  symmetrically. Tonsils are +0. No erythema or exudate. No oral cavity or oropharyngeal masses, lesions, ulcerations, or leukoplakia.  NECK: Supple, trachea is midline. There is no palpable cervical lymphadenopathy or masses bilaterally. Palpation of the bilateral parotid and submandibular areas reveal no masses. No thyromegaly.    NEUROLOGIC: Cranial nerves II through XII are grossly intact. Voice is strong. Patient is House-Brackmann I/VI bilaterally.  CARDIOVASCULAR: Extremities are warm and well-perfused. No significant peripheral edema.  RESPIRATORY: Patient has nonlabored breathing without cough, wheeze, stridor.  PSYCHIATRIC: Patient is alert and oriented. Mood and affect appear normal.  SKIN: Warm and dry. No scalp, face, or neck lesions noted.    Assessment and Plan     ICD-10-CM    1. Nasal septal deviation  J34.2         It was my pleasure seeing Viet Beebe today in clinic. The patient presents to clinic for concerns of recurrent sinus infections. There is evidence of a  septal deviation with a potential nasal valve collapse that may have occurred from past nasal trauma due to wrestling. We discussed getting a CT scan to rule out any other abnormalities within the sinuses. He will complete the Afrin/Breath test this week. I will reach out at the end of the week to see how the test went, then we will determine which ENT surgeon she should see in the future.     Afrin/Breathe Right Test    Obtain oxymetazoline (Afrin) nasal spray and Breathe Right nasal strips. Oxymetazoline (Afrin) and Breathe Right strips are available over the counter.  Do the following over 3 nights:    Night 1: Place 2 oxymetazoline (Afrin) sprays in each nostril within 30 minutes of bedtime.  Note how your nasal breathing is for sleep.    Night 2: Placed the Breathe Right nasal strips on the external part of your nose before bedtime, do not use Afrin tonight.  Note how your nasal breathing is for sleep.     Night 3: Place 2  oxymetazoline (Afrin) sprays in each nostril within 30 minutes of bedtime and use the Breathe Right nasal strip before bed.  Note how your nasal breathing is for sleep.    Do not use the Afrin nasal spray more than 3 nights in a row.    CLIFF Dailey [facility]  Otolaryngology  [facility]      Sino-Nasal Outcome Test (SNOT - 22)    1. Need to Blow Nose: (Patient-Rptd) (P) Severe  2. Nasal Blockage: (Patient-Rptd) (P) Severe  3. Sneezing: (Patient-Rptd) (P) Moderate  4. Runny Nose: (Patient-Rptd) (P) Very mild  5. Cough: (Patient-Rptd) (P) None  6. Post-nasal discharge: (Patient-Rptd) (P) Very mild  7. Thick nasal discharge: (Patient-Rptd) (P) Moderate  8. Ear fullness: (Patient-Rptd) (P) Mild or slight  9. Dizziness: (Patient-Rptd) (P) Very mild  10. Ear Pain: (Patient-Rptd) (P) Very mild  11. Facial pain/pressure: (Patient-Rptd) (P) Severe  12. Decreased Sense of Smell/Taste: (Patient-Rptd) (P) Moderate  13. Difficulty falling asleep: (Patient-Rptd) (P) Severe  14. Wake up at night: (Patient-Rptd) (P) Moderate  15. Lack of a good night's sleep: (Patient-Rptd) (P) Severe  16. Wake up tired: (Patient-Rptd) (P) Severe  17. Fatigue: (Patient-Rptd) (P) Moderate  18. Reduced Productivity: (Patient-Rptd) (P) Moderate  19. Reduced Concentration: (Patient-Rptd) (P) Mild or slight  20. Frustrated/restless/irritable: (Patient-Rptd) (P) Moderate  21. Sad: (Patient-Rptd) (P) Mild or slight  22. Embarrassed: (Patient-Rptd) (P) Mild or slight    Total Score: (Patient-Rptd) (P) 57    COPYRIGHT 1996. [facility] [place] . [place]     Again, thank you for allowing me to participate in the care of your patient.        Sincerely,        CLIFF Dailey CNP    Electronically signed

## 2025-07-07 NOTE — NURSING NOTE
"Initial /82   Pulse 74   Temp 97.5  F (36.4  C)   Wt 83.9 kg (185 lb)   SpO2 99%   BMI 27.32 kg/m   Estimated body mass index is 27.32 kg/m  as calculated from the following:    Height as of 6/10/25: 1.753 m (5' 9\").    Weight as of this encounter: 83.9 kg (185 lb). .  Genie Knox MA on 7/7/2025 at 11:04 AM    "

## 2025-07-09 ENCOUNTER — VIRTUAL VISIT (OUTPATIENT)
Dept: PSYCHOLOGY | Facility: CLINIC | Age: 40
End: 2025-07-09
Payer: COMMERCIAL

## 2025-07-09 DIAGNOSIS — F41.1 GENERALIZED ANXIETY DISORDER: ICD-10-CM

## 2025-07-09 DIAGNOSIS — F33.1 MODERATE EPISODE OF RECURRENT MAJOR DEPRESSIVE DISORDER (H): Primary | ICD-10-CM

## 2025-07-09 PROCEDURE — 90837 PSYTX W PT 60 MINUTES: CPT | Mod: 95

## 2025-07-09 NOTE — PROGRESS NOTES
[facility]                                     Progress Note    Patient Name: Viet Beebe  Date: 7/09/25           Service Type: Individual      Session Start Time: 10:04: am  Session End Time: 10:41 am     Session Length: 38 minutes    Session #: 18    Attendees: Client attended alone    Service Modality:  Video Visit:      Provider verified identity through the following two step process.  Patient provided:  Patient is known previously to provider    Telemedicine Visit: The patient's condition can be safely assessed and treated via synchronous audio and visual telemedicine encounter.      Reason for Telemedicine Visit: Services only offered telehealth    Originating Site (Patient Location): Patient's home    Distant Site (Provider Location): Provider Remote Setting- Home Office    Consent:  The patient/guardian has verbally consented to: the potential risks and benefits of telemedicine (video visit) versus in person care; bill my insurance or make self-payment for services provided; and responsibility for payment of non-covered services.     Patient would like the video invitation sent by:  Send to e-mail at: [email or web address]    Mode of Communication:  Video Conference via [facility]    Distant Location (Provider):  Off-site    As the provider I attest to compliance with applicable laws and regulations related to telemedicine.    DATA  Interactive Complexity: No  Crisis: No  Extended Session (53+ minutes):   - Treatment protocol required additional time to complete, due to the nature of diagnosis being treated.  See Interventions section for details        Progress Since Last Session (Related to Symptoms / Goals / Homework):   Symptoms: Improving Patient has been feeling better, mood is improved.      Homework: Achieved / completed to satisfaction      Episode of Care Goals: Satisfactory progress - ACTION (Actively working towards change); Intervened by reinforcing change plan / affirming  steps taken     Current / Ongoing Stressors and Concerns:   Pt reports ongoing stressors as navigating various relationships and roles in different areas of life. Pt shared events that took place since last session and processed emotions. Pt learned about EMDR and started processing a memory from his past.    Treatment Objective(s) Addressed in This Session:   Increase interest, engagement, and pleasure in doing things  Decrease frequency and intensity of feeling down, depressed, hopeless  Identify negative self-talk and behaviors: challenge core beliefs, myths, and actions  Improve concentration, focus, and mindfulness in daily activities .       Intervention:   EMDR: Started processing a traumatic memory  Motivational Interviewing: Asking questions about reasons for communicating feelings and becoming more assertive.    Assessments completed prior to visit:  The following assessments were completed by patient for this visit:  PHQ9:       2/12/2025    10:28 AM 2/25/2025    10:58 AM 4/3/2025     9:37 AM 4/8/2025    11:29 AM 5/14/2025     9:06 AM 6/10/2025     1:13 PM 7/7/2025    11:00 AM   PHQ-9 SCORE   PHQ-9 Total Score MyChart  11 (Moderate depression) 9 (Mild depression) 8 (Mild depression) 6 (Mild depression) 7 (Mild depression)    PHQ-9 Total Score 13 11  9  8  6  7  6       Patient-reported     GAD7:       7/6/2021    12:11 PM 3/28/2022     9:22 AM 5/17/2022     3:34 PM 4/11/2023     7:09 AM 6/6/2024    11:43 AM 2/12/2025    10:28 AM 2/21/2025    10:35 AM   MONIK-7 SCORE   Total Score  6 (mild anxiety) 7 (mild anxiety) 6 (mild anxiety) 7 (mild anxiety)  18 (severe anxiety)   Total Score 6 6 7 6 7 18 18        Patient-reported     PROMIS 10-Global Health (all questions and answers displayed):       2/9/2025    12:38 PM 2/12/2025    10:23 AM 2/21/2025    10:39 AM 5/23/2025    11:04 AM 6/6/2025    10:42 AM   PROMIS 10   In general, would you say your health is: Fair  Good Very good Good   In general, would you say  your quality of life is: Good  Very good Very good Good   In general, how would you rate your physical health? Good  Very good Very good Very good   In general, how would you rate your mental health, including your mood and your ability to think? Poor  Good Good Good   In general, how would you rate your satisfaction with your social activities and relationships? Fair  Very good Good Very good   In general, please rate how well you carry out your usual social activities and roles Very good  Excellent Good Very good   To what extent are you able to carry out your everyday physical activities such as walking, climbing stairs, carrying groceries, or moving a chair? Completely  Completely Completely Completely   In the past 7 days, how often have you been bothered by emotional problems such as feeling anxious, depressed, or irritable? Often  Often Sometimes Sometimes   In the past 7 days, how would you rate your fatigue on average? Moderate  Moderate Moderate Moderate   In the past 7 days, how would you rate your pain on average, where 0 means no pain, and 10 means worst imaginable pain? 6  5 5 4   In general, would you say your health is: 2 3 3 4 3   In general, would you say your quality of life is: 3 3 4 4 3   In general, how would you rate your physical health? 3 3 4 4 4   In general, how would you rate your mental health, including your mood and your ability to think? 1 3 3 3 3   In general, how would you rate your satisfaction with your social activities and relationships? 2 4 4 3 4   In general, please rate how well you carry out your usual social activities and roles. (This includes activities at home, at work and in your community, and responsibilities as a parent, child, spouse, employee, friend, etc.) 4 3 5 3 4   To what extent are you able to carry out your everyday physical activities such as walking, climbing stairs, carrying groceries, or moving a chair? 5 4 5 5 5   In the past 7 days, how often have you  "been bothered by emotional problems such as feeling anxious, depressed, or irritable? 4 4 4 3 3   In the past 7 days, how would you rate your fatigue on average? 3 4 3 3 3   In the past 7 days, how would you rate your pain on average, where 0 means no pain, and 10 means worst imaginable pain? 6 4 5 5 4   Global Mental Health Score 8  12 13  13  13    Global Physical Health Score 14  12 15  15  15    PROMIS TOTAL - SUBSCORES 22  24 28  28  28        Patient-reported     [place] Suicide Severity Rating Scale (Lifetime/Recent)      4/4/2024    12:10 PM 2/12/2025    10:17 AM 2/16/2025    12:33 AM 2/16/2025     9:32 AM 2/16/2025     6:09 PM 2/16/2025    10:00 PM   [place] Suicide Severity Rating (Lifetime/Recent)   Q1 Wished to be Dead (Past Month) 0-->no  0-->no 0-->no 0-->no 0-->no   Q2 Suicidal Thoughts (Past Month) 0-->no  0-->no 0-->no 0-->no 0-->no   Q6 Suicide Behavior (Lifetime) 0-->no  0-->no 0-->no 0-->no 0-->no   Level of Risk per Screen no risks indicated   no risks indicated  no risks indicated  no risks indicated  no risks indicated    1. Wish to be Dead (Lifetime)  Y       Wish to be Dead Description (Lifetime)  \"I do not feel worthy\" \" I do not feel that I deserve to be here.\"       1. Wish to be Dead (Past 1 Month)  N       2. Non-Specific Active Suicidal Thoughts (Lifetime)  N       Most Severe Ideation Rating (Lifetime)  3       Description of Most Severe Ideation (Lifetime)  \"I am going to be in pain forever.\"       Most Severe Ideation Rating (Past 1 Month)  1       Frequency (Lifetime)  2       Frequency (Past 1 Month)  1       Duration (Lifetime)  1       Duration (Past 1 Month)  1       Controllability (Lifetime)  1       Controllability (Past 1 Month)  1       Deterrents (Lifetime)  1       Deterrents (Past 1 Month)  1       Reasons for Ideation (Lifetime)  5       Reasons for Ideation (Past 1 Month)  1       Actual Attempt (Lifetime)  N       Has subject engaged in non-suicidal self-injurious " behavior? (Lifetime)  N       Interrupted Attempts (Lifetime)  N       Aborted or Self-Interrupted Attempt (Lifetime)  N       Preparatory Acts or Behavior (Lifetime)  N       Calculated C-SSRS Risk Score (Lifetime/Recent)  No Risk Indicated           Data saved with a previous flowsheet row definition         ASSESSMENT: Current Emotional / Mental Status (status of significant symptoms):   Risk status (Self / Other harm or suicidal ideation)   Patient denies current fears or concerns for personal safety.   Patient denies current or recent suicidal ideation or behaviors.   Patient denies current or recent homicidal ideation or behaviors.   Patient denies current or recent self injurious behavior or ideation.   Patient denies other safety concerns.   Patient reports there has been no change in risk factors since their last session.     Patient reports there has been no change in protective factors since their last session.     Recommended that patient call 911 or go to the local ED should there be a change in any of these risk factors     Appearance:   Appropriate    Eye Contact:   Good    Psychomotor Behavior: Normal    Attitude:   Cooperative  Friendly Attentive   Orientation:   All   Speech    Rate / Production: Normal     Volume:  Normal    Mood:    Euthymic   Affect:    Appropriate    Thought Content:  Clear    Thought Form:  Coherent  Logical    Insight:    Good      Medication Review:   No changes to current psychiatric medication(s)     Medication Compliance:   Yes     Changes in Health Issues:   None reported     Chemical Use Review:   Substance Use: Chemical use reviewed, no active concerns identified      Tobacco Use: No current tobacco use.      Diagnosis:  1. Moderate episode of recurrent major depressive disorder (H)    2. Generalized anxiety disorder                  Collateral Reports Completed:   Not Applicable    PLAN: (Patient Tasks / Therapist Tasks / Other)    Patient will use resourcing if he  feels stressed   Patient will continue to work on house projects      Manjinder BRUNO Caro                                                         ______________________________________________________________________    Individual Treatment Plan    Patient's Name: Viet Beebe  YOB: 1985    Date of Creation: 2/26/2025  Date Treatment Plan Last Reviewed/Revised: 2/26/2025    DSM5 Diagnoses: 300.02 (F41.1) Generalized Anxiety Disorder  Psychosocial / Contextual Factors: Patient has stopped using alcohol and marijuana and has been experiencing an increase in anxiety.  PROMIS (reviewed every 90 days): PROMIS-10 Scores        2/21/2025    10:39 AM 5/23/2025    11:04 AM 6/6/2025    10:42 AM   PROMIS-10 Total Score w/o Sub Scores   PROMIS TOTAL - SUBSCORES 28  28  28        Patient-reported         Referral / Collaboration:  Referral to another professional/service is not indicated at this time..    Anticipated number of session for this episode of care: 20-30 sessions  Anticipation frequency of session: Weekly  Anticipated Duration of each session: 38-52 minutes  Treatment plan will be reviewed in 90 days or when goals have been changed.       MeasurableTreatment Goal(s) related to diagnosis / functional impairment(s)  Goal 1: Patient will like to manage his anxiety    I will know I've met my goal when my anxiety goes down.      Objective #A (Patient Action)    Patient will set boundaries at work.  Status: New - Date: 2/26/2025     Intervention(s)  Therapist will teach about healthy boundaries.  .    Objective #B  Patient will name and express his feelings.  Status: New - Date: 2/26/2025     Intervention(s)  Therapist will assign homework Patient will use emotions wheel.    Patient has reviewed and agreed to the above plan.      Manjinder BRUNO Caro  February 26, 2025

## 2025-07-11 ENCOUNTER — MYC MEDICAL ADVICE (OUTPATIENT)
Dept: OTOLARYNGOLOGY | Facility: CLINIC | Age: 40
End: 2025-07-11
Payer: COMMERCIAL

## 2025-07-19 ENCOUNTER — HOSPITAL ENCOUNTER (OUTPATIENT)
Dept: CT IMAGING | Facility: CLINIC | Age: 40
Discharge: HOME | End: 2025-07-19
Payer: COMMERCIAL

## 2025-07-19 PROCEDURE — 70486 CT MAXILLOFACIAL W/O DYE: CPT

## 2025-07-22 ENCOUNTER — TELEPHONE (OUTPATIENT)
Dept: OTOLARYNGOLOGY | Facility: OTHER | Age: 40
End: 2025-07-22
Payer: COMMERCIAL

## 2025-07-22 NOTE — TELEPHONE ENCOUNTER
[facility]    Phone Message    May a detailed message be left on voicemail: yes     Reason for Call: Other: Per pt he called in looking for the appt with Dr. Mcdonald and it was already gone. We looked at the schedule for Dr. Almaraz and Dr. Mcdonald and they are booked out until December and November. Is there someone else you would recommend? Thank you.     Action Taken: Message routed to:  [facility] ENT    Travel Screening: Not Applicable     Date of Service:

## 2025-07-22 NOTE — TELEPHONE ENCOUNTER
[facility] E[facility] response sent to include relevant info that may assist pt.  Cris SESAY   [facility]

## 2025-07-23 ENCOUNTER — VIRTUAL VISIT (OUTPATIENT)
Dept: PSYCHOLOGY | Facility: CLINIC | Age: 40
End: 2025-07-23
Payer: COMMERCIAL

## 2025-07-23 DIAGNOSIS — F33.40 RECURRENT DEPRESSIVE DISORDER, CURRENTLY IN REMISSION: Primary | ICD-10-CM

## 2025-07-23 PROCEDURE — 90834 PSYTX W PT 45 MINUTES: CPT | Mod: 95

## 2025-07-23 NOTE — PROGRESS NOTES
[facility]                                     Progress Note    Patient Name: Viet Beebe  Date: 7/23/25           Service Type: Individual      Session Start Time: 10:04: am  Session End Time: 10:47 am     Session Length: 43 minutes    Session #: 19    Attendees: Client attended alone    Service Modality:  Video Visit:      Provider verified identity through the following two step process.  Patient provided:  Patient is known previously to provider    Telemedicine Visit: The patient's condition can be safely assessed and treated via synchronous audio and visual telemedicine encounter.      Reason for Telemedicine Visit: Services only offered telehealth    Originating Site (Patient Location): Patient's home    Distant Site (Provider Location): Provider Remote Setting- Home Office    Consent:  The patient/guardian has verbally consented to: the potential risks and benefits of telemedicine (video visit) versus in person care; bill my insurance or make self-payment for services provided; and responsibility for payment of non-covered services.     Patient would like the video invitation sent by:  Send to e-mail at: [email or web address]    Mode of Communication:  Video Conference via [facility]    Distant Location (Provider):  Off-site    As the provider I attest to compliance with applicable laws and regulations related to telemedicine.    DATA  Interactive Complexity: No  Crisis: No          Progress Since Last Session (Related to Symptoms / Goals / Homework):   Symptoms: Improving Patient has been feeling better, mood is improved.      Homework: Achieved / completed to satisfaction      Episode of Care Goals: Satisfactory progress - ACTION (Actively working towards change); Intervened by reinforcing change plan / affirming steps taken     Current / Ongoing Stressors and Concerns:   Pt reports ongoing stressors as navigating various relationships and roles in different areas of life. Pt shared events  that took place since last session and processed emotions. Pt shared about some frustration at work.  He noticed that he has been feeling better about setting boundaries and being assertive.    Treatment Objective(s) Addressed in This Session:   Increase interest, engagement, and pleasure in doing things  Decrease frequency and intensity of feeling down, depressed, hopeless  Improve diet, appetite, mindful eating, and / or meal planning  Identify negative self-talk and behaviors: challenge core beliefs, myths, and actions  Improve concentration, focus, and mindfulness in daily activities   Discuss motivation / ambivalence about a referral to specialty mental health services (Therapy, Day Tx, PHP).       Intervention:   Motivational Interviewing: Asking questions about reasons for communicating feelings and also to continue expanding coping skills..    Assessments completed prior to visit:  The following assessments were completed by patient for this visit:  PHQ9:       2/12/2025    10:28 AM 2/25/2025    10:58 AM 4/3/2025     9:37 AM 4/8/2025    11:29 AM 5/14/2025     9:06 AM 6/10/2025     1:13 PM 7/7/2025    11:00 AM   PHQ-9 SCORE   PHQ-9 Total Score MyChart  11 (Moderate depression) 9 (Mild depression) 8 (Mild depression) 6 (Mild depression) 7 (Mild depression)    PHQ-9 Total Score 13 11  9  8  6  7  6       Patient-reported     GAD7:       7/6/2021    12:11 PM 3/28/2022     9:22 AM 5/17/2022     3:34 PM 4/11/2023     7:09 AM 6/6/2024    11:43 AM 2/12/2025    10:28 AM 2/21/2025    10:35 AM   MONIK-7 SCORE   Total Score  6 (mild anxiety) 7 (mild anxiety) 6 (mild anxiety) 7 (mild anxiety)  18 (severe anxiety)   Total Score 6 6 7 6 7 18 18        Patient-reported     PROMIS 10-Global Health (all questions and answers displayed):       2/9/2025    12:38 PM 2/12/2025    10:23 AM 2/21/2025    10:39 AM 5/23/2025    11:04 AM 6/6/2025    10:42 AM   PROMIS 10   In general, would you say your health is: Fair  Good Very good  Good   In general, would you say your quality of life is: Good  Very good Very good Good   In general, how would you rate your physical health? Good  Very good Very good Very good   In general, how would you rate your mental health, including your mood and your ability to think? Poor  Good Good Good   In general, how would you rate your satisfaction with your social activities and relationships? Fair  Very good Good Very good   In general, please rate how well you carry out your usual social activities and roles Very good  Excellent Good Very good   To what extent are you able to carry out your everyday physical activities such as walking, climbing stairs, carrying groceries, or moving a chair? Completely  Completely Completely Completely   In the past 7 days, how often have you been bothered by emotional problems such as feeling anxious, depressed, or irritable? Often  Often Sometimes Sometimes   In the past 7 days, how would you rate your fatigue on average? Moderate  Moderate Moderate Moderate   In the past 7 days, how would you rate your pain on average, where 0 means no pain, and 10 means worst imaginable pain? 6  5 5 4   In general, would you say your health is: 2 3 3 4 3   In general, would you say your quality of life is: 3 3 4 4 3   In general, how would you rate your physical health? 3 3 4 4 4   In general, how would you rate your mental health, including your mood and your ability to think? 1 3 3 3 3   In general, how would you rate your satisfaction with your social activities and relationships? 2 4 4 3 4   In general, please rate how well you carry out your usual social activities and roles. (This includes activities at home, at work and in your community, and responsibilities as a parent, child, spouse, employee, friend, etc.) 4 3 5 3 4   To what extent are you able to carry out your everyday physical activities such as walking, climbing stairs, carrying groceries, or moving a chair? 5 4 5 5 5   In the  "past 7 days, how often have you been bothered by emotional problems such as feeling anxious, depressed, or irritable? 4 4 4 3 3   In the past 7 days, how would you rate your fatigue on average? 3 4 3 3 3   In the past 7 days, how would you rate your pain on average, where 0 means no pain, and 10 means worst imaginable pain? 6 4 5 5 4   Global Mental Health Score 8  12 13  13  13    Global Physical Health Score 14  12 15  15  15    PROMIS TOTAL - SUBSCORES 22  24 28  28  28        Patient-reported     [place] Suicide Severity Rating Scale (Lifetime/Recent)      4/4/2024    12:10 PM 2/12/2025    10:17 AM 2/16/2025    12:33 AM 2/16/2025     9:32 AM 2/16/2025     6:09 PM 2/16/2025    10:00 PM   [place] Suicide Severity Rating (Lifetime/Recent)   Q1 Wished to be Dead (Past Month) 0-->no  0-->no 0-->no 0-->no 0-->no   Q2 Suicidal Thoughts (Past Month) 0-->no  0-->no 0-->no 0-->no 0-->no   Q6 Suicide Behavior (Lifetime) 0-->no  0-->no 0-->no 0-->no 0-->no   Level of Risk per Screen no risks indicated   no risks indicated  no risks indicated  no risks indicated  no risks indicated    1. Wish to be Dead (Lifetime)  Y       Wish to be Dead Description (Lifetime)  \"I do not feel worthy\" \" I do not feel that I deserve to be here.\"       1. Wish to be Dead (Past 1 Month)  N       2. Non-Specific Active Suicidal Thoughts (Lifetime)  N       Most Severe Ideation Rating (Lifetime)  3       Description of Most Severe Ideation (Lifetime)  \"I am going to be in pain forever.\"       Most Severe Ideation Rating (Past 1 Month)  1       Frequency (Lifetime)  2       Frequency (Past 1 Month)  1       Duration (Lifetime)  1       Duration (Past 1 Month)  1       Controllability (Lifetime)  1       Controllability (Past 1 Month)  1       Deterrents (Lifetime)  1       Deterrents (Past 1 Month)  1       Reasons for Ideation (Lifetime)  5       Reasons for Ideation (Past 1 Month)  1       Actual Attempt (Lifetime)  N       Has subject engaged " in non-suicidal self-injurious behavior? (Lifetime)  N       Interrupted Attempts (Lifetime)  N       Aborted or Self-Interrupted Attempt (Lifetime)  N       Preparatory Acts or Behavior (Lifetime)  N       Calculated C-SSRS Risk Score (Lifetime/Recent)  No Risk Indicated           Data saved with a previous flowsheet row definition         ASSESSMENT: Current Emotional / Mental Status (status of significant symptoms):   Risk status (Self / Other harm or suicidal ideation)   Patient denies current fears or concerns for personal safety.   Patient denies current or recent suicidal ideation or behaviors.   Patient denies current or recent homicidal ideation or behaviors.   Patient denies current or recent self injurious behavior or ideation.   Patient denies other safety concerns.   Patient reports there has been no change in risk factors since their last session.     Patient reports there has been no change in protective factors since their last session.     Recommended that patient call 911 or go to the local ED should there be a change in any of these risk factors     Appearance:   Appropriate    Eye Contact:   Good    Psychomotor Behavior: Normal    Attitude:   Cooperative  Friendly Attentive   Orientation:   All   Speech    Rate / Production: Normal     Volume:  Normal    Mood:    Euthymic   Affect:    Appropriate    Thought Content:  Clear    Thought Form:  Coherent  Logical    Insight:    Good      Medication Review:   No changes to current psychiatric medication(s)     Medication Compliance:   Yes     Changes in Health Issues:   None reported     Chemical Use Review:   Substance Use: Chemical use reviewed, no active concerns identified      Tobacco Use: No current tobacco use.      Diagnosis:  1. Recurrent depressive disorder, currently in remission                    Collateral Reports Completed:   Not Applicable    PLAN: (Patient Tasks / Therapist Tasks / Other)    Patient will practice good sleep  routine  Patient will try to finish house projects      Manjinder BRUNO Caro                                                         ______________________________________________________________________    Individual Treatment Plan    Patient's Name: Viet Beebe  YOB: 1985    Date of Creation: 2/26/2025  Date Treatment Plan Last Reviewed/Revised: 2/26/2025    DSM5 Diagnoses: 300.02 (F41.1) Generalized Anxiety Disorder  Psychosocial / Contextual Factors: Patient has stopped using alcohol and marijuana and has been experiencing an increase in anxiety.  PROMIS (reviewed every 90 days): PROMIS-10 Scores        2/21/2025    10:39 AM 5/23/2025    11:04 AM 6/6/2025    10:42 AM   PROMIS-10 Total Score w/o Sub Scores   PROMIS TOTAL - SUBSCORES 28  28  28        Patient-reported         Referral / Collaboration:  Referral to another professional/service is not indicated at this time..    Anticipated number of session for this episode of care: 20-30 sessions  Anticipation frequency of session: Weekly  Anticipated Duration of each session: 38-52 minutes  Treatment plan will be reviewed in 90 days or when goals have been changed.       MeasurableTreatment Goal(s) related to diagnosis / functional impairment(s)  Goal 1: Patient will like to manage his anxiety    I will know I've met my goal when my anxiety goes down.      Objective #A (Patient Action)    Patient will set boundaries at work.  Status: New - Date: 2/26/2025     Intervention(s)  Therapist will teach about healthy boundaries.  .    Objective #B  Patient will name and express his feelings.  Status: New - Date: 2/26/2025     Intervention(s)  Therapist will assign homework Patient will use emotions wheel.    Patient has reviewed and agreed to the above plan.      Manjinder BRUNO Caro  February 26, 2025

## 2025-07-29 NOTE — PROGRESS NOTES
Chief Complaint   Patient presents with    Ent Problem     Deviated septum; itching in ears & throat; facial pain & pressure; sinus headaches      History of Present Illness  Viet Beebe is a 40 year old male who presents today for evaluation.  The patient was seen by CLIFF Dailey CNP with persistent nasal obstruction on 7/7/2025.  He has been using Afrin previously and was eventually able to stop the Afrin but had persistent nasal obstruction after the fact.  Eddie noted a left nasal septal deviation, inferior turbinate hypertrophy, and possible external nasal valve collapse.  The patient underwent CT imaging of the sinuses without contrast on 7/19/2025.  My review of the sinus CT shows a mild amount of sinonasal mucosal thickening in the floor of the right maxillary sinus.  The remainder of the paranasal sinuses are clear without any significant evidence of acute or chronic inflammation.  The OMCs are patent.  The patient does have a left anterior mid nasal septal deviation with moderate inferior turbinate hypertrophy.      The patient reports longstanding nasal dryness, difficulty breathing through the nose, frequent need to blow the nose with expulsion of dried material, and intermittent sinus pressure and headache. Described itching in the ears and throat occurring a few days per month, with variable frequency. Noted that symptoms can be absent for up to a month at times. Main concern is persistent nasal obstruction. Has used Afrin nasal spray previously with benefit but developed significant nasal dryness and sores. Previously tried Flonase without improvement. Uses saline sprays and gels for nasal moisture. No prior nose or sinus surgery. No tobacco use. Has used CPAP with a full face mask nightly since 2016 due to difficulty breathing through the nose. No prior allergy testing reported.  No history of nose or sinus surgery.    Past Medical History  Patient Active Problem List   Diagnosis     CARDIOVASCULAR SCREENING; LDL GOAL LESS THAN 160    Chronic daily headache    Pre-syncope    Anxiety and depression    Snoring    Major depression    Hypertension goal BP (blood pressure) < 130/80    YVAN (obstructive sleep apnea)    Moderate episode of recurrent major depressive disorder (H)    Migraine with aura and without status migrainosus, not intractable    Gastroesophageal reflux disease without esophagitis    Generalized anxiety disorder    Psychosis (H)    Severe recurrent major depression with psychotic features (H)    Moderate major depression, single episode (H)    Recurrent depressive disorder, currently in remission    Nasal obstruction    Nasal septal deviation    Hypertrophy of both inferior nasal turbinates    Chronic rhinitis    YVAN on CPAP     Current Medications    Current Outpatient Medications:     lisinopril-hydrochlorothiazide (ZESTORETIC) 20-25 MG tablet, Take 1 tablet by mouth daily., Disp: 90 tablet, Rfl: 3    omeprazole (PRILOSEC) 20 MG DR capsule, TAKE 1 CAPSULE BY MOUTH 30 TO 60 MINUTES BEFORE A MEAL, Disp: 90 capsule, Rfl: 3    polyethylene glycol (MIRALAX) 17 g packet, Take 1 packet by mouth daily as needed for constipation., Disp: , Rfl:     sertraline (ZOLOFT) 100 MG tablet, Take 1.5 tablets (150 mg) by mouth daily., Disp: 135 tablet, Rfl: 3    SUMAtriptan (IMITREX) 50 MG tablet, TAKE 1 TABLET AT ONSET OF HEADACHE FOR MIGRAINE MAY REPEAT IN 2 HOURS. MAXIMUM 4 TABLETS IN 24 HOURS, Disp: 18 tablet, Rfl: 11    Allergies  No Known Allergies    Social History  Social History     Socioeconomic History    Marital status: Single    Number of children: 0    Years of education: 17   Occupational History    Occupation: Electrical Enginner     Employer: [email or web address]   Tobacco Use    Smoking status: Former     Current packs/day: 0.00     Types: Cigarettes     Quit date: 1/1/2010     Years since quitting: 15.5     Passive exposure: Never    Smokeless tobacco: Never   Vaping Use    Vaping status: Never  Used   Substance and Sexual Activity    Alcohol use: Yes     Comment: Drinks over rhe w/e. 5 drinks weekly    Drug use: Never     Comment: Marihuana every 3rd day.    Sexual activity: Not Currently     Partners: Female     Birth control/protection: Condom   Other Topics Concern    Parent/sibling w/ CABG, MI or angioplasty before 65F 55M? No     Social Drivers of Health     Financial Resource Strain: Low Risk  (6/6/2025)    Financial Resource Strain     Within the past 12 months, have you or your family members you live with been unable to get utilities (heat, electricity) when it was really needed?: No   Food Insecurity: Low Risk  (6/6/2025)    Food Insecurity     Within the past 12 months, did you worry that your food would run out before you got money to buy more?: No     Within the past 12 months, did the food you bought just not last and you didn t have money to get more?: No   Transportation Needs: Low Risk  (6/6/2025)    Transportation Needs     Within the past 12 months, has lack of transportation kept you from medical appointments, getting your medicines, non-medical meetings or appointments, work, or from getting things that you need?: No   Physical Activity: Sufficiently Active (6/6/2025)    Exercise Vital Sign     Days of Exercise per Week: 3 days     Minutes of Exercise per Session: 50 min   Stress: Stress Concern Present (6/6/2025)    [place] [employer] - Occupational Stress Questionnaire     Feeling of Stress : To some extent   Social Connections: Unknown (6/6/2025)    Social Connection and Isolation Panel [NHANES]     Frequency of Social Gatherings with Friends and Family: Once a week   Interpersonal Safety: Low Risk  (6/10/2025)    Interpersonal Safety     Do you feel physically and emotionally safe where you currently live?: Yes     Within the past 12 months, have you been hit, slapped, kicked or otherwise physically hurt by someone?: No     Within the past 12 months, have you  been humiliated or emotionally abused in other ways by your partner or ex-partner?: No   Housing Stability: Low Risk  (6/6/2025)    Housing Stability     Do you have housing? : Yes     Are you worried about losing your housing?: No       Family History  Family History   Problem Relation Age of Onset    Skin Cancer Mother     Cancer Maternal Grandmother     Heart Disease Maternal Grandfather         MI    Cerebrovascular Disease Maternal Grandfather 40    Alzheimer Disease Paternal Grandfather 70        Alzheimers       Review of Systems  As per HPI and PMHx, otherwise 10 system review including the head and neck, constitutional, eyes, respiratory, GI, skin, neurologic, lymphatic, endocrine, and allergy systems is negative.    Physical Exam  /82 (BP Location: Left arm, Patient Position: Sitting, Cuff Size: Adult Regular)   Pulse 83   SpO2 98%   GENERAL: The patient is a pleasant, cooperative 40 year old male in no acute distress.  HEAD: Normocephalic, atraumatic. Hair and scalp are normal.  EYES: Pupils are equal, round, reactive to light and accommodation. Extraocular movements are intact. The sclera nonicteric without injection. The extraocular structures are normal.  EARS: Normal shape and symmetry. No tenderness when palpating the mastoid or tragal areas bilaterally. No mastoid erythema or fluctuance.    NOSE: Nares are patent.  Nasal mucosa is slightly dry.  Anterior rhinoscopy reveals a spur off the right maxillary crest with a primarily leftward nasal septal deviation with a spur posteriorly on the left.  The inferior turbinates are markedly hypertrophied bilaterally.  No nasal cavity masses, polyps, or mucopurulence on anterior rhinoscopy.  NEUROLOGIC: Cranial nerves II through XII are grossly intact. Voice is strong. Patient is House-Brackmann I/VI bilaterally.  CARDIOVASCULAR: Extremities are warm and well-perfused. No significant peripheral edema.  RESPIRATORY: Patient has nonlabored breathing  without cough, wheeze, stridor.  PSYCHIATRIC: Patient is alert and oriented. Mood and affect appear normal.  SKIN: Warm and dry. No scalp, face, or neck lesions noted.    Assessment and Plan     ICD-10-CM    1. Nasal obstruction  J34.89 Case Request: SEPTOPLASTY, NOSE, WITH TURBINOPLASTY      2. Nasal septal deviation  J34.2 Case Request: SEPTOPLASTY, NOSE, WITH TURBINOPLASTY      3. Hypertrophy of both inferior nasal turbinates  J34.3 Case Request: SEPTOPLASTY, NOSE, WITH TURBINOPLASTY      4. Chronic rhinitis  J31.0 Case Request: SEPTOPLASTY, NOSE, WITH TURBINOPLASTY      5. YVAN on CPAP  G47.33 Case Request: SEPTOPLASTY, NOSE, WITH TURBINOPLASTY         It was my pleasure seeing Viet Beebe today in clinic.      Nasal obstruction:  - Nasal obstruction likely due to septal deviation and turbinate hypertrophy. Possible underlying allergy component.  - Consider nasal surgery to improve CPAP tolerance and nasal airflow. Use saline sprays or gels to maintain nasal moisture.    Nasal septal deviation:  - Septal deviation contributing to nasal obstruction and sinus drainage issues.  - Surgical correction of septal deviation recommended.    Hypertrophy of both inferior nasal turbinates:  - Turbinate hypertrophy contributing to nasal obstruction.  - Surgical reduction of turbinates recommended.    Chronic rhinitis:  - Chronic rhinitis possibly related to underlying allergies.  - Consider allergy evaluation and management if symptoms persist post-surgery.    YVAN on CPAP:  - CPAP intolerance possibly due to nasal obstruction.  - Nasal surgery to improve CPAP tolerance and explore different mask options post-surgery.    We discussed the risks, benefits, alternatives, options of endonasal septoplasty with bilateral inferior turbinate reduction including, but not limited to: Risk of bleeding, risk of infection, risk of postoperative pain, risk of septal hematoma, risk of septal perforation, risk of CSF leak, risk of  intranasal scarring or adhesions, risk of smell disturbance or smell loss, potential need for additional procedures, risk of general anesthesia. The patient voiced understanding is willing to proceed. We discussed the postoperative course and convalescence including lifting and activity restrictions, placement of nasal splints, use of nasal saline irrigations postoperatively, and follow-up.    Ben Mcdonald MD  Department of Otolaryngology-Head and Neck Surgery  [facility]

## 2025-07-30 ENCOUNTER — OFFICE VISIT (OUTPATIENT)
Dept: OTOLARYNGOLOGY | Facility: CLINIC | Age: 40
End: 2025-07-30
Payer: COMMERCIAL

## 2025-07-30 ENCOUNTER — TELEPHONE (OUTPATIENT)
Dept: SLEEP MEDICINE | Facility: CLINIC | Age: 40
End: 2025-07-30

## 2025-07-30 VITALS — OXYGEN SATURATION: 98 % | DIASTOLIC BLOOD PRESSURE: 82 MMHG | HEART RATE: 83 BPM | SYSTOLIC BLOOD PRESSURE: 110 MMHG

## 2025-07-30 DIAGNOSIS — J34.89 NASAL OBSTRUCTION: Primary | ICD-10-CM

## 2025-07-30 DIAGNOSIS — J34.3 HYPERTROPHY OF BOTH INFERIOR NASAL TURBINATES: ICD-10-CM

## 2025-07-30 DIAGNOSIS — J34.2 NASAL SEPTAL DEVIATION: ICD-10-CM

## 2025-07-30 DIAGNOSIS — G47.33 OSA ON CPAP: ICD-10-CM

## 2025-07-30 DIAGNOSIS — J31.0 CHRONIC RHINITIS: ICD-10-CM

## 2025-07-30 PROCEDURE — 3074F SYST BP LT 130 MM HG: CPT | Performed by: OTOLARYNGOLOGY

## 2025-07-30 PROCEDURE — 99214 OFFICE O/P EST MOD 30 MIN: CPT | Performed by: OTOLARYNGOLOGY

## 2025-07-30 PROCEDURE — 3079F DIAST BP 80-89 MM HG: CPT | Performed by: OTOLARYNGOLOGY

## 2025-07-30 NOTE — LETTER
7/30/2025      Viet Beebe  [address] [place] [address]      Dear Colleague,    Thank you for referring your patient, Viet Beebe, to the [facility]. Please see a copy of my visit note below.    Chief Complaint   Patient presents with     Ent Problem     Deviated septum; itching in ears & throat; facial pain & pressure; sinus headaches      History of Present Illness  Viet Beebe is a 40 year old male who presents today for evaluation.  The patient was seen by CLIFF Dailey CNP with persistent nasal obstruction on 7/7/2025.  He has been using Afrin previously and was eventually able to stop the Afrin but had persistent nasal obstruction after the fact.  Eddie noted a left nasal septal deviation, inferior turbinate hypertrophy, and possible external nasal valve collapse.  The patient underwent CT imaging of the sinuses without contrast on 7/19/2025.  My review of the sinus CT shows a mild amount of sinonasal mucosal thickening in the floor of the right maxillary sinus.  The remainder of the paranasal sinuses are clear without any significant evidence of acute or chronic inflammation.  The OMCs are patent.  The patient does have a left anterior mid nasal septal deviation with moderate inferior turbinate hypertrophy.      The patient reports longstanding nasal dryness, difficulty breathing through the nose, frequent need to blow the nose with expulsion of dried material, and intermittent sinus pressure and headache. Described itching in the ears and throat occurring a few days per month, with variable frequency. Noted that symptoms can be absent for up to a month at times. Main concern is persistent nasal obstruction. Has used Afrin nasal spray previously with benefit but developed significant nasal dryness and sores. Previously tried Flonase without improvement. Uses saline sprays and gels for nasal moisture. No prior nose or sinus surgery. No tobacco use. Has used CPAP  with a full face mask nightly since 2016 due to difficulty breathing through the nose. No prior allergy testing reported.  No history of nose or sinus surgery.    Past Medical History  Patient Active Problem List   Diagnosis     CARDIOVASCULAR SCREENING; LDL GOAL LESS THAN 160     Chronic daily headache     Pre-syncope     Anxiety and depression     Snoring     Major depression     Hypertension goal BP (blood pressure) < 130/80     YVAN (obstructive sleep apnea)     Moderate episode of recurrent major depressive disorder (H)     Migraine with aura and without status migrainosus, not intractable     Gastroesophageal reflux disease without esophagitis     Generalized anxiety disorder     Psychosis (H)     Severe recurrent major depression with psychotic features (H)     Moderate major depression, single episode (H)     Recurrent depressive disorder, currently in remission     Nasal obstruction     Nasal septal deviation     Hypertrophy of both inferior nasal turbinates     Chronic rhinitis     YVAN on CPAP     Current Medications    Current Outpatient Medications:      lisinopril-hydrochlorothiazide (ZESTORETIC) 20-25 MG tablet, Take 1 tablet by mouth daily., Disp: 90 tablet, Rfl: 3     omeprazole (PRILOSEC) 20 MG DR capsule, TAKE 1 CAPSULE BY MOUTH 30 TO 60 MINUTES BEFORE A MEAL, Disp: 90 capsule, Rfl: 3     polyethylene glycol (MIRALAX) 17 g packet, Take 1 packet by mouth daily as needed for constipation., Disp: , Rfl:      sertraline (ZOLOFT) 100 MG tablet, Take 1.5 tablets (150 mg) by mouth daily., Disp: 135 tablet, Rfl: 3     SUMAtriptan (IMITREX) 50 MG tablet, TAKE 1 TABLET AT ONSET OF HEADACHE FOR MIGRAINE MAY REPEAT IN 2 HOURS. MAXIMUM 4 TABLETS IN 24 HOURS, Disp: 18 tablet, Rfl: 11    Allergies  No Known Allergies    Social History  Social History     Socioeconomic History     Marital status: Single     Number of children: 0     Years of education: 17   Occupational History     Occupation: Electrical Enginner      Employer: [employer]   Tobacco Use     Smoking status: Former     Current packs/day: 0.00     Types: Cigarettes     Quit date: 1/1/2010     Years since quitting: 15.5     Passive exposure: Never     Smokeless tobacco: Never   Vaping Use     Vaping status: Never Used   Substance and Sexual Activity     Alcohol use: Yes     Comment: Drinks over rhe w/e. 5 drinks weekly     Drug use: Never     Comment: Marihuana every 3rd day.     Sexual activity: Not Currently     Partners: Female     Birth control/protection: Condom   Other Topics Concern     Parent/sibling w/ CABG, MI or angioplasty before 65F 55M? No     Social Drivers of Health     Financial Resource Strain: Low Risk  (6/6/2025)    Financial Resource Strain      Within the past 12 months, have you or your family members you live with been unable to get utilities (heat, electricity) when it was really needed?: No   Food Insecurity: Low Risk  (6/6/2025)    Food Insecurity      Within the past 12 months, did you worry that your food would run out before you got money to buy more?: No      Within the past 12 months, did the food you bought just not last and you didn t have money to get more?: No   Transportation Needs: Low Risk  (6/6/2025)    Transportation Needs      Within the past 12 months, has lack of transportation kept you from medical appointments, getting your medicines, non-medical meetings or appointments, work, or from getting things that you need?: No   Physical Activity: Sufficiently Active (6/6/2025)    Exercise Vital Sign      Days of Exercise per Week: 3 days      Minutes of Exercise per Session: 50 min   Stress: Stress Concern Present (6/6/2025)    [place] citizen [place] [employer] - Occupational Stress Questionnaire      Feeling of Stress : To some extent   Social Connections: Unknown (6/6/2025)    Social Connection and Isolation Panel [NHANES]      Frequency of Social Gatherings with Friends and Family: Once a week   Interpersonal Safety: Low  Risk  (6/10/2025)    Interpersonal Safety      Do you feel physically and emotionally safe where you currently live?: Yes      Within the past 12 months, have you been hit, slapped, kicked or otherwise physically hurt by someone?: No      Within the past 12 months, have you been humiliated or emotionally abused in other ways by your partner or ex-partner?: No   Housing Stability: Low Risk  (6/6/2025)    Housing Stability      Do you have housing? : Yes      Are you worried about losing your housing?: No       Family History  Family History   Problem Relation Age of Onset     Skin Cancer Mother      Cancer Maternal Grandmother      Heart Disease Maternal Grandfather         MI     Cerebrovascular Disease Maternal Grandfather 40     Alzheimer Disease Paternal Grandfather 70        Alzheimers       Review of Systems  As per HPI and PMHx, otherwise 10 system review including the head and neck, constitutional, eyes, respiratory, GI, skin, neurologic, lymphatic, endocrine, and allergy systems is negative.    Physical Exam  /82 (BP Location: Left arm, Patient Position: Sitting, Cuff Size: Adult Regular)   Pulse 83   SpO2 98%   GENERAL: The patient is a pleasant, cooperative 40 year old male in no acute distress.  HEAD: Normocephalic, atraumatic. Hair and scalp are normal.  EYES: Pupils are equal, round, reactive to light and accommodation. Extraocular movements are intact. The sclera nonicteric without injection. The extraocular structures are normal.  EARS: Normal shape and symmetry. No tenderness when palpating the mastoid or tragal areas bilaterally. No mastoid erythema or fluctuance.    NOSE: Nares are patent.  Nasal mucosa is slightly dry.  Anterior rhinoscopy reveals a spur off the right maxillary crest with a primarily leftward nasal septal deviation with a spur posteriorly on the left.  The inferior turbinates are markedly hypertrophied bilaterally.  No nasal cavity masses, polyps, or mucopurulence on  anterior rhinoscopy.  NEUROLOGIC: Cranial nerves II through XII are grossly intact. Voice is strong. Patient is House-Brackmann I/VI bilaterally.  CARDIOVASCULAR: Extremities are warm and well-perfused. No significant peripheral edema.  RESPIRATORY: Patient has nonlabored breathing without cough, wheeze, stridor.  PSYCHIATRIC: Patient is alert and oriented. Mood and affect appear normal.  SKIN: Warm and dry. No scalp, face, or neck lesions noted.    Assessment and Plan     ICD-10-CM    1. Nasal obstruction  J34.89 Case Request: SEPTOPLASTY, NOSE, WITH TURBINOPLASTY      2. Nasal septal deviation  J34.2 Case Request: SEPTOPLASTY, NOSE, WITH TURBINOPLASTY      3. Hypertrophy of both inferior nasal turbinates  J34.3 Case Request: SEPTOPLASTY, NOSE, WITH TURBINOPLASTY      4. Chronic rhinitis  J31.0 Case Request: SEPTOPLASTY, NOSE, WITH TURBINOPLASTY      5. YVAN on CPAP  G47.33 Case Request: SEPTOPLASTY, NOSE, WITH TURBINOPLASTY         It was my pleasure seeing Viet Beebe today in clinic.      Nasal obstruction:  - Nasal obstruction likely due to septal deviation and turbinate hypertrophy. Possible underlying allergy component.  - Consider nasal surgery to improve CPAP tolerance and nasal airflow. Use saline sprays or gels to maintain nasal moisture.    Nasal septal deviation:  - Septal deviation contributing to nasal obstruction and sinus drainage issues.  - Surgical correction of septal deviation recommended.    Hypertrophy of both inferior nasal turbinates:  - Turbinate hypertrophy contributing to nasal obstruction.  - Surgical reduction of turbinates recommended.    Chronic rhinitis:  - Chronic rhinitis possibly related to underlying allergies.  - Consider allergy evaluation and management if symptoms persist post-surgery.    YVAN on CPAP:  - CPAP intolerance possibly due to nasal obstruction.  - Nasal surgery to improve CPAP tolerance and explore different mask options post-surgery.    We discussed the risks,  benefits, alternatives, options of endonasal septoplasty with bilateral inferior turbinate reduction including, but not limited to: Risk of bleeding, risk of infection, risk of postoperative pain, risk of septal hematoma, risk of septal perforation, risk of CSF leak, risk of intranasal scarring or adhesions, risk of smell disturbance or smell loss, potential need for additional procedures, risk of general anesthesia. The patient voiced understanding is willing to proceed. We discussed the postoperative course and convalescence including lifting and activity restrictions, placement of nasal splints, use of nasal saline irrigations postoperatively, and follow-up.    Ben Mcdonald MD  Department of Otolaryngology-Head and Neck Surgery  [facility]     Again, thank you for allowing me to participate in the care of your patient.        Sincerely,        Ben Mcdonald MD    Electronically signed

## 2025-07-30 NOTE — TELEPHONE ENCOUNTER
Left message for patient to call us back to discuss surgery dates    Myranda Cedillo M.A.  Specialty surgery Scheduler

## 2025-07-30 NOTE — TELEPHONE ENCOUNTER
Type of surgery: SEPTOPLASTY, NOSE, WITH TURBINOPLASTY   Location of surgery: [facility]  Date and time of surgery: 9/8  Surgeon: lida   Pre-Op Appt Date: 8/29  Post-Op Appt Date:  9/16 11/18  Packet sent out: yes  Pre-cert/Authorization completed:    Date:

## 2025-08-06 ENCOUNTER — VIRTUAL VISIT (OUTPATIENT)
Dept: PSYCHOLOGY | Facility: CLINIC | Age: 40
End: 2025-08-06
Payer: COMMERCIAL

## 2025-08-06 DIAGNOSIS — F33.40 RECURRENT DEPRESSIVE DISORDER, CURRENTLY IN REMISSION: Primary | ICD-10-CM

## 2025-08-06 DIAGNOSIS — F41.1 GENERALIZED ANXIETY DISORDER: ICD-10-CM

## 2025-08-06 PROCEDURE — 90834 PSYTX W PT 45 MINUTES: CPT | Mod: 95

## 2025-08-07 ENCOUNTER — TRANSFERRED RECORDS (OUTPATIENT)
Dept: ADMINISTRATIVE | Facility: CLINIC | Age: 40
End: 2025-08-07
Payer: COMMERCIAL

## 2025-08-19 ASSESSMENT — PATIENT HEALTH QUESTIONNAIRE - PHQ9
SUM OF ALL RESPONSES TO PHQ QUESTIONS 1-9: 7
SUM OF ALL RESPONSES TO PHQ QUESTIONS 1-9: 7
10. IF YOU CHECKED OFF ANY PROBLEMS, HOW DIFFICULT HAVE THESE PROBLEMS MADE IT FOR YOU TO DO YOUR WORK, TAKE CARE OF THINGS AT HOME, OR GET ALONG WITH OTHER PEOPLE: SOMEWHAT DIFFICULT

## 2025-08-20 ENCOUNTER — VIRTUAL VISIT (OUTPATIENT)
Dept: PSYCHOLOGY | Facility: CLINIC | Age: 40
End: 2025-08-20
Payer: COMMERCIAL

## 2025-08-20 DIAGNOSIS — F33.40 RECURRENT DEPRESSIVE DISORDER, CURRENTLY IN REMISSION: Primary | ICD-10-CM

## 2025-08-20 PROCEDURE — 90837 PSYTX W PT 60 MINUTES: CPT | Mod: 95
